# Patient Record
Sex: MALE | Race: WHITE | NOT HISPANIC OR LATINO | Employment: UNEMPLOYED | URBAN - METROPOLITAN AREA
[De-identification: names, ages, dates, MRNs, and addresses within clinical notes are randomized per-mention and may not be internally consistent; named-entity substitution may affect disease eponyms.]

---

## 2021-12-09 ENCOUNTER — APPOINTMENT (OUTPATIENT)
Dept: NEUROLOGY | Facility: HOSPITAL | Age: 26
End: 2021-12-09

## 2021-12-09 ENCOUNTER — APPOINTMENT (OUTPATIENT)
Dept: CT IMAGING | Facility: HOSPITAL | Age: 26
End: 2021-12-09

## 2021-12-09 ENCOUNTER — HOSPITAL ENCOUNTER (INPATIENT)
Facility: HOSPITAL | Age: 26
LOS: 1 days | Discharge: LEFT BEFORE DISCHARGE | End: 2021-12-09
Attending: EMERGENCY MEDICINE | Admitting: INTERNAL MEDICINE

## 2021-12-09 VITALS
DIASTOLIC BLOOD PRESSURE: 71 MMHG | WEIGHT: 233.03 LBS | HEIGHT: 67 IN | HEART RATE: 72 BPM | OXYGEN SATURATION: 100 % | SYSTOLIC BLOOD PRESSURE: 92 MMHG | RESPIRATION RATE: 16 BRPM | TEMPERATURE: 95.8 F | BODY MASS INDEX: 36.57 KG/M2

## 2021-12-09 DIAGNOSIS — G40.901 STATUS EPILEPTICUS (HCC): Primary | ICD-10-CM

## 2021-12-09 PROBLEM — F19.10 SUBSTANCE ABUSE: Status: ACTIVE | Noted: 2021-12-09

## 2021-12-09 PROBLEM — G93.41 ACUTE METABOLIC ENCEPHALOPATHY: Status: ACTIVE | Noted: 2021-12-09

## 2021-12-09 LAB
ALBUMIN SERPL-MCNC: 4.5 G/DL (ref 3.5–5.2)
ALBUMIN/GLOB SERPL: 2.4 G/DL
ALP SERPL-CCNC: 91 U/L (ref 39–117)
ALT SERPL W P-5'-P-CCNC: 45 U/L (ref 1–41)
AMPHET+METHAMPHET UR QL: POSITIVE
ANION GAP SERPL CALCULATED.3IONS-SCNC: 10.3 MMOL/L (ref 5–15)
AST SERPL-CCNC: 38 U/L (ref 1–40)
BACTERIA UR QL AUTO: NORMAL /HPF
BARBITURATES UR QL SCN: NEGATIVE
BASOPHILS # BLD AUTO: 0.05 10*3/MM3 (ref 0–0.2)
BASOPHILS NFR BLD AUTO: 0.5 % (ref 0–1.5)
BENZODIAZ UR QL SCN: POSITIVE
BILIRUB SERPL-MCNC: 0.2 MG/DL (ref 0–1.2)
BILIRUB UR QL STRIP: NEGATIVE
BUN SERPL-MCNC: 10 MG/DL (ref 6–20)
BUN/CREAT SERPL: 8.5 (ref 7–25)
CALCIUM SPEC-SCNC: 9.2 MG/DL (ref 8.6–10.5)
CANNABINOIDS SERPL QL: POSITIVE
CHLORIDE SERPL-SCNC: 105 MMOL/L (ref 98–107)
CLARITY UR: CLEAR
CO2 SERPL-SCNC: 25.7 MMOL/L (ref 22–29)
COCAINE UR QL: NEGATIVE
COLOR UR: YELLOW
CREAT SERPL-MCNC: 1.17 MG/DL (ref 0.76–1.27)
DEPRECATED RDW RBC AUTO: 38.5 FL (ref 37–54)
EOSINOPHIL # BLD AUTO: 0.08 10*3/MM3 (ref 0–0.4)
EOSINOPHIL NFR BLD AUTO: 0.9 % (ref 0.3–6.2)
ERYTHROCYTE [DISTWIDTH] IN BLOOD BY AUTOMATED COUNT: 12.2 % (ref 12.3–15.4)
ETHANOL BLD-MCNC: <10 MG/DL (ref 0–10)
ETHANOL UR QL: <0.01 %
GFR SERPL CREATININE-BSD FRML MDRD: 76 ML/MIN/1.73
GLOBULIN UR ELPH-MCNC: 1.9 GM/DL
GLUCOSE BLDC GLUCOMTR-MCNC: 114 MG/DL (ref 70–99)
GLUCOSE SERPL-MCNC: 109 MG/DL (ref 65–99)
GLUCOSE UR STRIP-MCNC: NEGATIVE MG/DL
HCT VFR BLD AUTO: 39.4 % (ref 37.5–51)
HGB BLD-MCNC: 13.7 G/DL (ref 13–17.7)
HGB UR QL STRIP.AUTO: NEGATIVE
HOLD SPECIMEN: NORMAL
HOLD SPECIMEN: NORMAL
HYALINE CASTS UR QL AUTO: NORMAL /LPF
IMM GRANULOCYTES # BLD AUTO: 0.03 10*3/MM3 (ref 0–0.05)
IMM GRANULOCYTES NFR BLD AUTO: 0.3 % (ref 0–0.5)
INR PPP: 1.03 (ref 2–3)
KETONES UR QL STRIP: NEGATIVE
LEUKOCYTE ESTERASE UR QL STRIP.AUTO: NEGATIVE
LYMPHOCYTES # BLD AUTO: 1.54 10*3/MM3 (ref 0.7–3.1)
LYMPHOCYTES NFR BLD AUTO: 16.4 % (ref 19.6–45.3)
MAGNESIUM SERPL-MCNC: 2.3 MG/DL (ref 1.6–2.6)
MCH RBC QN AUTO: 30.1 PG (ref 26.6–33)
MCHC RBC AUTO-ENTMCNC: 34.8 G/DL (ref 31.5–35.7)
MCV RBC AUTO: 86.6 FL (ref 79–97)
METHADONE UR QL SCN: NEGATIVE
MONOCYTES # BLD AUTO: 0.98 10*3/MM3 (ref 0.1–0.9)
MONOCYTES NFR BLD AUTO: 10.4 % (ref 5–12)
NEUTROPHILS NFR BLD AUTO: 6.73 10*3/MM3 (ref 1.7–7)
NEUTROPHILS NFR BLD AUTO: 71.5 % (ref 42.7–76)
NITRITE UR QL STRIP: NEGATIVE
NRBC BLD AUTO-RTO: 0 /100 WBC (ref 0–0.2)
OPIATES UR QL: NEGATIVE
OXYCODONE UR QL SCN: NEGATIVE
PH UR STRIP.AUTO: 6 [PH] (ref 5–8)
PHOSPHATE SERPL-MCNC: 2.5 MG/DL (ref 2.5–4.5)
PLATELET # BLD AUTO: 292 10*3/MM3 (ref 140–450)
PMV BLD AUTO: 9.5 FL (ref 6–12)
POTASSIUM SERPL-SCNC: 3.8 MMOL/L (ref 3.5–5.2)
PROT SERPL-MCNC: 6.4 G/DL (ref 6–8.5)
PROT UR QL STRIP: ABNORMAL
PROTHROMBIN TIME: 10.8 SECONDS (ref 9.4–12)
QT INTERVAL: 381 MS
RBC # BLD AUTO: 4.55 10*6/MM3 (ref 4.14–5.8)
RBC # UR STRIP: NORMAL /HPF
REF LAB TEST METHOD: NORMAL
SODIUM SERPL-SCNC: 141 MMOL/L (ref 136–145)
SP GR UR STRIP: 1.02 (ref 1–1.03)
SQUAMOUS #/AREA URNS HPF: NORMAL /HPF
TROPONIN T SERPL-MCNC: <0.01 NG/ML (ref 0–0.03)
UROBILINOGEN UR QL STRIP: ABNORMAL
WBC # UR STRIP: NORMAL /HPF
WBC NRBC COR # BLD: 9.41 10*3/MM3 (ref 3.4–10.8)
WHOLE BLOOD HOLD SPECIMEN: NORMAL
WHOLE BLOOD HOLD SPECIMEN: NORMAL

## 2021-12-09 PROCEDURE — 80307 DRUG TEST PRSMV CHEM ANLYZR: CPT | Performed by: EMERGENCY MEDICINE

## 2021-12-09 PROCEDURE — 25010000002 FOSPHENYTOIN 100 MG PE/2ML SOLUTION 2 ML VIAL: Performed by: EMERGENCY MEDICINE

## 2021-12-09 PROCEDURE — 84484 ASSAY OF TROPONIN QUANT: CPT | Performed by: EMERGENCY MEDICINE

## 2021-12-09 PROCEDURE — 80053 COMPREHEN METABOLIC PANEL: CPT | Performed by: EMERGENCY MEDICINE

## 2021-12-09 PROCEDURE — 70450 CT HEAD/BRAIN W/O DYE: CPT

## 2021-12-09 PROCEDURE — 85610 PROTHROMBIN TIME: CPT | Performed by: EMERGENCY MEDICINE

## 2021-12-09 PROCEDURE — 85025 COMPLETE CBC W/AUTO DIFF WBC: CPT | Performed by: EMERGENCY MEDICINE

## 2021-12-09 PROCEDURE — 93010 ELECTROCARDIOGRAM REPORT: CPT | Performed by: INTERNAL MEDICINE

## 2021-12-09 PROCEDURE — 82077 ASSAY SPEC XCP UR&BREATH IA: CPT | Performed by: EMERGENCY MEDICINE

## 2021-12-09 PROCEDURE — 93005 ELECTROCARDIOGRAM TRACING: CPT

## 2021-12-09 PROCEDURE — 99285 EMERGENCY DEPT VISIT HI MDM: CPT

## 2021-12-09 PROCEDURE — 25010000002 LORAZEPAM PER 2 MG: Performed by: EMERGENCY MEDICINE

## 2021-12-09 PROCEDURE — 99223 1ST HOSP IP/OBS HIGH 75: CPT | Performed by: INTERNAL MEDICINE

## 2021-12-09 PROCEDURE — 84100 ASSAY OF PHOSPHORUS: CPT | Performed by: INTERNAL MEDICINE

## 2021-12-09 PROCEDURE — 82962 GLUCOSE BLOOD TEST: CPT

## 2021-12-09 PROCEDURE — 95819 EEG AWAKE AND ASLEEP: CPT

## 2021-12-09 PROCEDURE — 93005 ELECTROCARDIOGRAM TRACING: CPT | Performed by: EMERGENCY MEDICINE

## 2021-12-09 PROCEDURE — 0 LEVETIRACETAM IN NACL 0.75% 1000 MG/100ML SOLUTION: Performed by: INTERNAL MEDICINE

## 2021-12-09 PROCEDURE — 83735 ASSAY OF MAGNESIUM: CPT | Performed by: EMERGENCY MEDICINE

## 2021-12-09 PROCEDURE — 25010000002 FOSPHENYTOIN 500 MG PE/10ML SOLUTION 10 ML VIAL: Performed by: EMERGENCY MEDICINE

## 2021-12-09 PROCEDURE — 81001 URINALYSIS AUTO W/SCOPE: CPT | Performed by: EMERGENCY MEDICINE

## 2021-12-09 RX ORDER — ONDANSETRON 2 MG/ML
4 INJECTION INTRAMUSCULAR; INTRAVENOUS EVERY 6 HOURS PRN
Status: DISCONTINUED | OUTPATIENT
Start: 2021-12-09 | End: 2021-12-09 | Stop reason: HOSPADM

## 2021-12-09 RX ORDER — NALOXONE HYDROCHLORIDE 1 MG/ML
INJECTION INTRAMUSCULAR; INTRAVENOUS; SUBCUTANEOUS
Status: DISPENSED
Start: 2021-12-09 | End: 2021-12-09

## 2021-12-09 RX ORDER — LORAZEPAM 2 MG/ML
2 INJECTION INTRAMUSCULAR ONCE
Status: DISCONTINUED | OUTPATIENT
Start: 2021-12-09 | End: 2021-12-09

## 2021-12-09 RX ORDER — SODIUM CHLORIDE 0.9 % (FLUSH) 0.9 %
10 SYRINGE (ML) INJECTION EVERY 12 HOURS SCHEDULED
Status: DISCONTINUED | OUTPATIENT
Start: 2021-12-09 | End: 2021-12-09 | Stop reason: HOSPADM

## 2021-12-09 RX ORDER — FAMOTIDINE 20 MG/1
20 TABLET, FILM COATED ORAL DAILY
Status: DISCONTINUED | OUTPATIENT
Start: 2021-12-09 | End: 2021-12-09 | Stop reason: HOSPADM

## 2021-12-09 RX ORDER — ACETAMINOPHEN 160 MG/5ML
650 SOLUTION ORAL EVERY 4 HOURS PRN
Status: DISCONTINUED | OUTPATIENT
Start: 2021-12-09 | End: 2021-12-09 | Stop reason: HOSPADM

## 2021-12-09 RX ORDER — ACETAMINOPHEN 650 MG/1
650 SUPPOSITORY RECTAL EVERY 4 HOURS PRN
Status: DISCONTINUED | OUTPATIENT
Start: 2021-12-09 | End: 2021-12-09 | Stop reason: HOSPADM

## 2021-12-09 RX ORDER — LORAZEPAM 2 MG/ML
2 INJECTION INTRAMUSCULAR ONCE
Status: COMPLETED | OUTPATIENT
Start: 2021-12-09 | End: 2021-12-09

## 2021-12-09 RX ORDER — SODIUM CHLORIDE, SODIUM LACTATE, POTASSIUM CHLORIDE, CALCIUM CHLORIDE 600; 310; 30; 20 MG/100ML; MG/100ML; MG/100ML; MG/100ML
100 INJECTION, SOLUTION INTRAVENOUS CONTINUOUS
Status: DISCONTINUED | OUTPATIENT
Start: 2021-12-09 | End: 2021-12-09 | Stop reason: HOSPADM

## 2021-12-09 RX ORDER — LORAZEPAM 2 MG/ML
2 INJECTION INTRAMUSCULAR
Status: DISCONTINUED | OUTPATIENT
Start: 2021-12-09 | End: 2021-12-09 | Stop reason: HOSPADM

## 2021-12-09 RX ORDER — NALOXONE HYDROCHLORIDE 1 MG/ML
2 INJECTION INTRAMUSCULAR; INTRAVENOUS; SUBCUTANEOUS ONCE
Status: COMPLETED | OUTPATIENT
Start: 2021-12-09 | End: 2021-12-09

## 2021-12-09 RX ORDER — KETOROLAC TROMETHAMINE 30 MG/ML
30 INJECTION, SOLUTION INTRAMUSCULAR; INTRAVENOUS EVERY 6 HOURS PRN
Status: DISCONTINUED | OUTPATIENT
Start: 2021-12-09 | End: 2021-12-09 | Stop reason: HOSPADM

## 2021-12-09 RX ORDER — POLYETHYLENE GLYCOL 3350 17 G/17G
17 POWDER, FOR SOLUTION ORAL DAILY PRN
Status: DISCONTINUED | OUTPATIENT
Start: 2021-12-09 | End: 2021-12-09 | Stop reason: HOSPADM

## 2021-12-09 RX ORDER — ACETAMINOPHEN 325 MG/1
650 TABLET ORAL EVERY 4 HOURS PRN
Status: DISCONTINUED | OUTPATIENT
Start: 2021-12-09 | End: 2021-12-09 | Stop reason: HOSPADM

## 2021-12-09 RX ORDER — AMOXICILLIN 250 MG
2 CAPSULE ORAL 2 TIMES DAILY
Status: DISCONTINUED | OUTPATIENT
Start: 2021-12-09 | End: 2021-12-09 | Stop reason: HOSPADM

## 2021-12-09 RX ORDER — NALOXONE HCL 0.4 MG/ML
0.4 VIAL (ML) INJECTION
Status: DISCONTINUED | OUTPATIENT
Start: 2021-12-09 | End: 2021-12-09 | Stop reason: HOSPADM

## 2021-12-09 RX ORDER — BISACODYL 10 MG
10 SUPPOSITORY, RECTAL RECTAL DAILY PRN
Status: DISCONTINUED | OUTPATIENT
Start: 2021-12-09 | End: 2021-12-09 | Stop reason: HOSPADM

## 2021-12-09 RX ORDER — CHOLECALCIFEROL (VITAMIN D3) 125 MCG
5 CAPSULE ORAL NIGHTLY PRN
Status: DISCONTINUED | OUTPATIENT
Start: 2021-12-09 | End: 2021-12-09 | Stop reason: HOSPADM

## 2021-12-09 RX ORDER — LEVETIRACETAM 10 MG/ML
1000 INJECTION INTRAVASCULAR EVERY 12 HOURS SCHEDULED
Status: DISCONTINUED | OUTPATIENT
Start: 2021-12-09 | End: 2021-12-09 | Stop reason: HOSPADM

## 2021-12-09 RX ORDER — SODIUM CHLORIDE 0.9 % (FLUSH) 0.9 %
10 SYRINGE (ML) INJECTION AS NEEDED
Status: DISCONTINUED | OUTPATIENT
Start: 2021-12-09 | End: 2021-12-09 | Stop reason: HOSPADM

## 2021-12-09 RX ORDER — SODIUM CHLORIDE 0.9 % (FLUSH) 0.9 %
10 SYRINGE (ML) INJECTION AS NEEDED
Status: DISCONTINUED | OUTPATIENT
Start: 2021-12-09 | End: 2021-12-09

## 2021-12-09 RX ORDER — BISACODYL 5 MG/1
5 TABLET, DELAYED RELEASE ORAL DAILY PRN
Status: DISCONTINUED | OUTPATIENT
Start: 2021-12-09 | End: 2021-12-09 | Stop reason: HOSPADM

## 2021-12-09 RX ORDER — LORAZEPAM 2 MG/ML
INJECTION INTRAMUSCULAR
Status: DISPENSED
Start: 2021-12-09 | End: 2021-12-09

## 2021-12-09 RX ADMIN — LEVETIRACETAM 1000 MG: 10 INJECTION, SOLUTION INTRAVENOUS at 13:21

## 2021-12-09 RX ADMIN — LORAZEPAM 2 MG: 2 INJECTION INTRAMUSCULAR; INTRAVENOUS at 05:25

## 2021-12-09 RX ADMIN — FOSPHENYTOIN SODIUM 1590 MG PE: 50 INJECTION, SOLUTION INTRAMUSCULAR; INTRAVENOUS at 05:47

## 2021-12-09 RX ADMIN — NALOXONE HYDROCHLORIDE 2 MG: 1 INJECTION PARENTERAL at 04:52

## 2021-12-09 RX ADMIN — SODIUM CHLORIDE, POTASSIUM CHLORIDE, SODIUM LACTATE AND CALCIUM CHLORIDE 100 ML/HR: 600; 310; 30; 20 INJECTION, SOLUTION INTRAVENOUS at 13:21

## 2021-12-09 RX ADMIN — SODIUM CHLORIDE 1000 ML: 9 INJECTION, SOLUTION INTRAVENOUS at 05:42

## 2021-12-09 NOTE — ED NOTES
"Patient arrived with EMS. EMS called for seizure activity and reports 2 seizures as well as \"bizarre activity\" while en route to [facility]. Patient was given 2.5mg of versed and 2mg of narcan from EMS. Per EMS patient is on keppra and lamictal with a history of seizures.      Christine Hinojosa RN  12/09/21 1249    "

## 2021-12-09 NOTE — ED NOTES
Patient had tonic clonic seizure activity lasting approximately 60 seconds. Placed on nonrebreather at 15L, oral suction complete, and IV ativan given. Postictal patient is asleep, but will arouse to pain.      Christine Hinojosa RN  12/09/21 0625

## 2021-12-09 NOTE — ED PROVIDER NOTES
Time: 5:17 AM EST  Arrived by: ambulance  Chief Complaint: Seizure  History provided by: EMS and patient  History is limited by: N/A     History of Present Illness:  Patient is a 26 y.o. year old male that presents to the emergency department with seizure.  According to EMS, family called due to seizure.  Upon EMSs arrival, the patient was awake and alert but with erratic behavior.  They noted to nursing staff that they suspected possible substance abuse.  In route however, the patient began having a seizure.  The patient was administered 2.5 mg of Versed which stopped the seizure.  Upon arrival to the emergency room, the patient was postictal, confused with decreased responsiveness.  The patient's blood glucose was normal.  The patient was administered 2 mg of Narcan before I arrived in the room which the nurse believes drastically improved the patient's mental status.  When I evaluated the patient he was awake and alert but disoriented.  The patient states that he has seizures since he was 22 years old.  The patient states he takes Keppra and Lamictal.  The patient states that he has been taking that medicine.  Patient denies any headache, nausea, chest pain, chest pressure, shortness breath, palpitations, abdominal pain or any other symptoms.  The patient states that his only substance abuse is marijuana      Similar Symptoms Previously: Yes due to epilepsy  Recently seen: Unknown      Patient Care Team  Primary Care Provider: Provider, No Known    Past Medical History:     No Known Allergies  Past Medical History:   Diagnosis Date   • Seizures (HCC)      History reviewed. No pertinent surgical history.  History reviewed. No pertinent family history.    Home Medications:  Prior to Admission medications    Not on File        Social History:   Social History     Tobacco Use   • Smoking status: Not on file   • Smokeless tobacco: Not on file   Substance Use Topics   • Alcohol use: Not on file   • Drug use: Not on  "file          Record Review:  I have reviewed the patient's records in [facility].     Review of Systems:  Review of Systems   Constitutional: Negative for chills and fever.   HENT: Negative for congestion, postnasal drip, rhinorrhea and sore throat.    Eyes: Negative for photophobia.   Respiratory: Negative for apnea, cough and shortness of breath.    Cardiovascular: Negative for chest pain.   Gastrointestinal: Negative for abdominal pain, diarrhea, nausea and vomiting.   Genitourinary: Negative for difficulty urinating, dysuria, frequency, hematuria and urgency.   Musculoskeletal: Negative for gait problem and neck pain.   Skin: Negative for pallor.   Neurological: Positive for seizures. Negative for dizziness, syncope, weakness and headaches.           Physical Exam:  BP 92/71   Pulse 72   Temp 95.8 °F (35.4 °C)   Resp 16   Ht 170.2 cm (67\")   Wt 106 kg (233 lb 0.4 oz)   SpO2 100%   BMI 36.50 kg/m²     Physical Exam  Vitals and nursing note reviewed.   Constitutional:       General: He is not in acute distress.     Appearance: He is not ill-appearing, toxic-appearing or diaphoretic.   HENT:      Head: Normocephalic and atraumatic.      Mouth/Throat:      Mouth: Mucous membranes are moist.   Eyes:      Extraocular Movements: Extraocular movements intact.      Pupils: Pupils are equal, round, and reactive to light.   Cardiovascular:      Rate and Rhythm: Normal rate and regular rhythm.      Pulses: Normal pulses.      Heart sounds: Normal heart sounds. No murmur heard.      Pulmonary:      Effort: Pulmonary effort is normal. No accessory muscle usage, respiratory distress or retractions.      Breath sounds: Normal breath sounds. No wheezing, rhonchi or rales.   Abdominal:      General: Abdomen is flat. There is no distension.      Palpations: Abdomen is soft. There is no mass.      Tenderness: There is no abdominal tenderness. There is no right CVA tenderness, left CVA tenderness, guarding or rebound.      " Comments: No rigidity   Musculoskeletal:         General: No swelling, tenderness, deformity or signs of injury.      Cervical back: Normal range of motion and neck supple.      Right lower leg: No edema.      Left lower leg: No edema.   Skin:     General: Skin is warm and dry.      Capillary Refill: Capillary refill takes less than 2 seconds.      Coloration: Skin is not jaundiced or pale.      Findings: No bruising or erythema.   Neurological:      Mental Status: He is alert. He is disoriented.      GCS: GCS eye subscore is 4. GCS verbal subscore is 5. GCS motor subscore is 6.      Cranial Nerves: No cranial nerve deficit, dysarthria or facial asymmetry.      Sensory: No sensory deficit.      Motor: No weakness.      Comments: Complete neurological exam cannot be performed can secondary to the patient's slight confusion                Medications in the Emergency Department:  Medications   Naloxone HCl (NARCAN) injection 2 mg (2 mg Intravenous Given 12/9/21 0452)   sodium chloride 0.9 % bolus 1,000 mL (0 mL Intravenous Stopped 12/9/21 0621)   LORazepam (ATIVAN) injection 2 mg (2 mg Intravenous Given 12/9/21 0525)   fosphenytoin (Cerebyx) 1,590 mg PE in sodium chloride 0.9 % 100 mL IVPB (0 mg PE/kg × 106 kg Intravenous Stopped 12/9/21 0624)        Labs  Lab Results (last 24 hours)     ** No results found for the last 24 hours. **           Imaging:  CT Head Without Contrast   Final Result          Procedures:  Procedures    Progress                            Medical Decision Making:  MDM  Number of Diagnoses or Management Options  Status epilepticus (HCC)  Diagnosis management comments:     I was called to the patient's room at 5:20 AM.  The patient was actively having a seizure.  The patient had tonic-clonic activity.  Patient's pulse ox dropped down to 78%.  The seizure lasted for about a minute.  Patient's pulse ox returned back to 100%.  The patient was placed on oxygen.  2 mg of Ativan was administered.  A  gram of Celebrex will also be ordered    The patient had no further seizures in the emergency department.  The patient's CT was normal.  When the patient returned from CT.  The patient was postictal and somnolent.  The patient does open his eyes to painful stimulus.  The patient does localize to painful stimulus.  The patient's pulse ox was 92% on 2 L.  Patient will subsequently admitted to the hospital for status epilepticus       Amount and/or Complexity of Data Reviewed  Clinical lab tests: reviewed  Tests in the radiology section of CPT®: reviewed  Tests in the medicine section of CPT®: reviewed  Discuss the patient with other providers: yes (I discussed the patient with Dr. Freeman, neurologist on-call.  He feels the patient should be admitted to the hospital for status epilepticus.  He is comfortable with the current plan.  He will see and evaluate the patient in the hospital.  He is requesting the hospitalist admit.    I discussed case with the hospitalist, Dr. Major, who is agreeable for admission)    Critical Care  Total time providing critical care: < 30 minutes (Total critical care time of.  Total critical care time documented does not include time spent on separately billed procedures for services of nurses or physician assistants.  I personally saw and examined the patient.  I have reviewed all diagnostic interpretations and treatment plans as written.  I was present for the key portions of any procedures performed and the inclusive time noted in any critical care statement.  Critical care time includes patient management by me, time spent at the patient bedside, time to review labs/ ABG's, imaging results, discussing patient care, documentation in the medical record and time spent with family or caregiver)             Final diagnoses:   Status epilepticus (HCC)        Disposition:  ED Disposition     ED Disposition Condition Comment    AMA  Level of Care: Progressive Care [20]   Diagnosis: Status  epilepticus (HCC) [079834]   Admitting Physician: ELIZABETH FRANCISCO [[id number]]   Attending Physician: ELIZABETH FRANCISCO [[id number]]   Isolate for COVID?: No [0]   Certification: I Certify That Inpatient Hospital Services Are Medically Necessary For Greater Than 2 Midnights            This medical record created using voice recognition software and may contain unintended errors.    DO Conchita Madrid Scott, DO  12/10/21 0731

## 2021-12-09 NOTE — ED NOTES
Attempted to contact family using phone numbers on file. The first contact is [facility] which patient no longer lives in. The second contact is a disconnected phone number.      Christine Hinojosa RN  12/09/21 8592

## 2021-12-09 NOTE — PAYOR COMM NOTE
"Donald Blake (26 y.o. Male)             Date of Birth Social Security Number Address Home Phone MRN    1995  [address] [place] [id number] [phone] [id number]    Yarsani Marital Status             Unknown Unknown       Admission Date Admission Type Admitting Provider Attending Provider Department, Room/Bed    12/9/21 Emergency Praveen Major MD Dishaw, Scott, DO [facility] EMERGENCY ROOM, 11/11    Discharge Date Discharge Disposition Discharge Destination                         Attending Provider: Tito Arango DO    Allergies: No Known Allergies    Isolation: None   Infection: None   Code Status: CPR   Advance Care Planning Activity    Ht: 170.2 cm (67\")   Wt: 106 kg (233 lb 0.4 oz)    Admission Cmt: None   Principal Problem: None                Active Insurance as of 12/9/2021     Primary Coverage     Payor Plan Insurance Group Employer/Plan Group    [facility] BY MIK [id number]     Payor Plan Address Payor Plan Phone Number Payor Plan Fax Number Effective Dates    [address]   1/1/2021 - None Entered    [place] [address]       Subscriber Name Subscriber Birth Date Member ID       DONALD BLAKE 1995 [id number]                 Emergency Contacts          No emergency contacts on file.              LOC:Acute Adult-Epilepsy by Sigrid Goff         In Progress (Acute Met): Reviewed on 12/9/2021 by Sigrid Goff       Created Using Review Status Review Entered   InterQual Connect™ In Primary 12/9/2021 14:43       Criteria Set Name - Subset   LOC:Acute Adult-Epilepsy      Criteria Review   REVIEW SUMMARY     InterQual® Review Status: In Primary  Condition Specific: Yes        REVIEW DETAILS     Product: LOC:Acute Adult  Subset: Epilepsy        (Symptom or finding within 24h)     (Excludes PO medications unless noted)         [X] Select Day, One:          [X] Episode Day 1, One:              [X] ACUTE, One:                  " "[X] Known seizure disorder and, Both:                      [X] Finding, >= One:                          [X] >= 2 seizures within 24h and a change from baseline                      [X] EEG (continuous or video), initiated or performed within 24h        Version: InterQual® 2021, Apr. 2021 Release  InterQual® criteria (IQ) is confidential and proprietary information and is being provided to you solely as it pertains to the information requested. IQ may contain advanced clinical knowledge which we recommend you discuss with your physician upon disclosure to you. Use permitted by and subject to license with [employer] and/or one of its subsidiaries. IQ reflects clinical interpretations and analyses and cannot alone either (a) resolve medical ambiguities of particular situations; or (b) provide the sole basis for definitive decisions. IQ is intended solely for use as screening guidelines with respect to medical appropriateness of healthcare services. All ultimate care decisions are strictly and solely the obligation and responsibility of your health care provider. © 2021 [employer] and/or one of its subsidiaries. All Rights Reserved. CPT® only © 1842-0693 American Medical Association. All Rights Reserved.         Mary Jo: RUFINO [id number] Tax ID [id number]  Problem List           Codes Noted - Resolved       Hospital    Status epilepticus (HCC) ICD-10-CM: G40.901  ICD-9-CM: 345.3 12/9/2021 - Present    Substance abuse (HCC) ICD-10-CM: F19.10  ICD-9-CM: 305.90 12/9/2021 - Present    Acute metabolic encephalopathy ICD-10-CM: G93.41  ICD-9-CM: 348.31 12/9/2021 - Present          History & Physical    No notes of this type exist for this encounter.            Emergency Department Notes      Christine Hinojosa RN at 12/09/21 0420        Patient arrived with [facility]. EMS called for seizure activity and reports 2 seizures as well as \"bizarre activity\" while en route to [facility]. Patient was given 2.5mg of versed and " 2mg of narcan from EMS. Per EMS patient is on keppra and lamictal with a history of seizures.      Christine Hinojosa RN  12/09/21 0640      Electronically signed by Christine Hinojosa RN at 12/09/21 0640     Christine Hinojosa RN at 12/09/21 0500        Attempted to contact family using phone numbers on file. The first contact is [facility] which patient no longer lives in. The second contact is a disconnected phone number.      Christine Hinojosa RN  12/09/21 0636      Electronically signed by Christine Hinojosa RN at 12/09/21 0636     Christine Hinojosa RN at 12/09/21 0530        Patient had tonic clonic seizure activity lasting approximately 60 seconds. Placed on nonrebreather at 15L, oral suction complete, and IV ativan given. Postictal patient is asleep, but will arouse to pain.      Christine Hinojosa RN  12/09/21 0634      Electronically signed by Christine Hinojosa RN at 12/09/21 0634       Vital Signs (last day)     Date/Time Temp Temp src Pulse Resp BP Patient Position SpO2    12/09/21 1415 -- -- 82 -- 144/82 -- --    12/09/21 1400 -- -- 87 -- 151/77 -- --    12/09/21 1345 -- -- 71 -- 150/78 -- --    12/09/21 1330 -- -- 81 -- 128/72 -- 95    12/09/21 1315 -- -- 79 -- 144/71 -- --    12/09/21 1245 -- -- 81 -- -- -- --    12/09/21 1230 -- -- 79 -- -- -- --    12/09/21 1215 -- -- 78 -- 137/70 -- 100    12/09/21 1200 -- -- 78 -- 137/76 -- 100    12/09/21 1145 -- -- 79 -- 137/75 -- 100    12/09/21 1130 -- -- 67 -- 138/81 -- 100    12/09/21 1115 -- -- 71 -- 146/71 -- 99    12/09/21 1100 -- -- 80 -- 126/71 -- 100    12/09/21 1045 -- -- 79 -- 138/73 -- 100    12/09/21 1030 -- -- 73 -- 137/76 -- 100    12/09/21 1015 -- -- 78 -- 122/71 -- 100    12/09/21 1000 -- -- 79 -- 136/67 -- 100    12/09/21 0945 -- -- 75 -- 122/72 -- 100    12/09/21 0930 -- -- 73 -- 137/75 -- 100    12/09/21 0915 -- -- 77 -- 127/69 -- 100    12/09/21 0900 -- -- 77 -- 112/63 -- 100    12/09/21 0845 -- -- 80 -- 124/71 -- 99    12/09/21  0830 -- -- 81 -- 117/60 -- 99    12/09/21 0800 -- -- 81 -- 108/63 -- 98    12/09/21 0745 -- -- 86 -- 124/57 -- 97    12/09/21 0730 -- -- 85 -- 117/59 -- 94    12/09/21 0715 -- -- 87 -- 109/51 -- 94    12/09/21 0700 -- -- 83 -- 115/50 -- 95    12/09/21 0631 -- -- 88 -- -- -- --    12/09/21 0630 -- -- 87 -- 109/57 -- 94    12/09/21 0615 -- -- 74 -- 130/65 -- 99    12/09/21 0600 -- -- 82 -- 127/64 -- 100    12/09/21 0510 95.8 (35.4) -- -- -- -- -- --    12/09/21 0445 -- -- 84 -- 110/58 -- 98    12/09/21 0428 -- -- 75 16 129/76 -- 99          Oxygen Therapy (last day)     Date/Time SpO2 Device (Oxygen Therapy) Flow (L/min) Oxygen Concentration (%) ETCO2 (mmHg)    12/09/21 1330 95 -- -- -- --    12/09/21 1215 100 -- -- -- --    12/09/21 1200 100 -- -- -- --    12/09/21 1145 100 -- -- -- --    12/09/21 1130 100 -- -- -- --    12/09/21 1115 99 -- -- -- --    12/09/21 1100 100 -- -- -- --    12/09/21 1045 100 -- -- -- --    12/09/21 1030 100 -- -- -- --    12/09/21 1015 100 -- -- -- --    12/09/21 1000 100 -- -- -- --    12/09/21 0945 100 -- -- -- --    12/09/21 0930 100 -- -- -- --    12/09/21 0915 100 -- -- -- --    12/09/21 0900 100 -- -- -- --    12/09/21 0845 99 -- -- -- --    12/09/21 0830 99 -- -- -- --    12/09/21 0800 98 -- -- -- --    12/09/21 0745 97 -- -- -- --    12/09/21 0730 94 -- -- -- --    12/09/21 0715 94 -- -- -- --    12/09/21 0700 95 -- -- -- --    12/09/21 0630 94 -- -- -- --    12/09/21 0615 99 -- -- -- --    12/09/21 0600 100 -- -- -- --    12/09/21 0445 98 -- -- -- --    12/09/21 0428 99 -- -- -- --            Facility-Administered Medications as of 12/9/2021   Medication Dose Route Frequency Provider Last Rate Last Admin   • acetaminophen (TYLENOL) tablet 650 mg  650 mg Oral Q4H PRN Praveen Major MD        Or   • acetaminophen (TYLENOL) 160 MG/5ML solution 650 mg  650 mg Oral Q4H PRPraveen Enriquez MD        Or   • acetaminophen (TYLENOL) suppository 650 mg  650 mg Rectal Q4H PRN Praveen Major  MD Nano       • sennosides-docusate (PERICOLACE) 8.6-50 MG per tablet 2 tablet  2 tablet Oral BID Praveen Major MD        And   • polyethylene glycol (MIRALAX) packet 17 g  17 g Oral Daily PRN Praveen Major MD        And   • bisacodyl (DULCOLAX) EC tablet 5 mg  5 mg Oral Daily PRN Praveen Major MD        And   • bisacodyl (DULCOLAX) suppository 10 mg  10 mg Rectal Daily PRN Praveen Major MD       • famotidine (PEPCID) tablet 20 mg  20 mg Oral Daily Praveen Major MD       • [COMPLETED] fosphenytoin (Cerebyx) 1,590 mg PE in sodium chloride 0.9 % 100 mL IVPB  15 mg PE/kg Intravenous Once Tito Arango DO   Stopped at 12/09/21 0624   • HYDROmorphone (DILAUDID) injection 0.5 mg  0.5 mg Intravenous Q4H PRN Praveen Major MD        And   • naloxone (NARCAN) injection 0.4 mg  0.4 mg Intravenous Q5 Min PRN Praveen Major MD       • ketorolac (TORADOL) injection 30 mg  30 mg Intravenous Q6H PRN Praveen Major MD       • lactated ringers infusion  100 mL/hr Intravenous Continuous Praveen Major  mL/hr at 12/09/21 1321 100 mL/hr at 12/09/21 1321   • levETIRAcetam in NaCl 0.75% (KEPPRA) IVPB 1,000 mg  1,000 mg Intravenous Q12H Praveen Major MD   1,000 mg at 12/09/21 1321   • [COMPLETED] LORazepam (ATIVAN) injection 2 mg  2 mg Intravenous Once Tito Arango DO   2 mg at 12/09/21 0525   • LORazepam (ATIVAN) injection 2 mg  2 mg Intravenous Q5 Min PRN Praveen Major MD       • melatonin tablet 5 mg  5 mg Oral Nightly PRN Praveen Major MD       • [COMPLETED] Naloxone HCl (NARCAN) injection 2 mg  2 mg Intravenous Once Tito Arango DO   2 mg at 12/09/21 0452   • ondansetron (ZOFRAN) injection 4 mg  4 mg Intravenous Q6H PRN Praveen Major MD       • [COMPLETED] sodium chloride 0.9 % bolus 1,000 mL  1,000 mL Intravenous Once Tito Arango DO   Stopped at 12/09/21 0621   • sodium chloride 0.9 % flush 10 mL  10 mL Intravenous Q12H Praveen Major MD       • sodium chloride 0.9 % flush 10 mL  10  mL Intravenous Praveen Monroy MD         Respiratory Therapy (last 24 hours)     Respiratory Therapy Flowsheet     Row Name 12/09/21 1415 12/09/21 1400 12/09/21 1345 12/09/21 1330 12/09/21 1315       Oxygen Therapy    SpO2 -- -- -- 95 % --       Vital Signs    Pulse 82 87 71 81 79    /82 151/77 150/78 128/72 144/71    Noninvasive MAP (mmHg) 98 96 99 87 92    Row Name 12/09/21 1245 12/09/21 1230 12/09/21 1215 12/09/21 1200 12/09/21 1145       Oxygen Therapy    SpO2 -- -- 100 % 100 % 100 %       Vital Signs    Pulse 81 79 78 78 79    BP -- -- 137/70 137/76 137/75    Noninvasive MAP (mmHg) -- -- 89 92 90    Row Name 12/09/21 1130 12/09/21 1115 12/09/21 1100 12/09/21 1045 12/09/21 1030       Oxygen Therapy    SpO2 100 % 99 % 100 % 100 % 100 %       Vital Signs    Pulse 67 71 80 79 73    /81 146/71 126/71 138/73 137/76    Noninvasive MAP (mmHg) 96 93 85 92 93    Row Name 12/09/21 1015 12/09/21 1000 12/09/21 0945 12/09/21 0930 12/09/21 0915       Oxygen Therapy    SpO2 100 % 100 % 100 % 100 % 100 %       Vital Signs    Pulse 78 79 75 73 77    /71 136/67 122/72 137/75 127/69    Noninvasive MAP (mmHg) 87 87 88 92 85    Row Name 12/09/21 0900 12/09/21 0845 12/09/21 0830 12/09/21 0800 12/09/21 0745       Oxygen Therapy    SpO2 100 % 99 % 99 % 98 % 97 %       Vital Signs    Pulse 77 80 81 81 86    /63 124/71 117/60 108/63 124/57    Noninvasive MAP (mmHg) 79 86 77 76 76    Row Name 12/09/21 0730 12/09/21 0715 12/09/21 0700 12/09/21 0631 12/09/21 0630       Oxygen Therapy    SpO2 94 % 94 % 95 % -- 94 %       Vital Signs    Pulse 85 87 83 88 87    /59 109/51 115/50 -- 109/57    Noninvasive MAP (mmHg) 75 69 69 -- 72    Row Name 12/09/21 0615 12/09/21 0600 12/09/21 0510 12/09/21 0445 12/09/21 0428       Oxygen Therapy    SpO2 99 % 100 % -- 98 % 99 %       Vital Signs    Temp -- -- 95.8 °F (35.4 °C) -- --    Pulse 74 82 -- 84 75    Heart Rate Source -- -- -- -- Monitor    Resp -- -- -- -- 16    BP  130/65 127/64 -- 110/58 129/76    Noninvasive MAP (mmHg) 85 82 -- 73 --              Physician Progress Notes (last 24 hours)  Notes from 12/08/21 1530 through 12/09/21 1530   No notes of this type exist for this encounter.         Consult Notes (last 24 hours)  Notes from 12/08/21 1530 through 12/09/21 1530   No notes of this type exist for this encounter.         Respiratory Therapy Notes (last 24 hours)  Notes from 12/08/21 1530 through 12/09/21 1530   No notes exist for this encounter.       Tito Arango DO   Physician   Specialty:  Emergency Medicine   ED Provider Notes       Shared   Date of Service:  12/09/21 0517   Creation Time:  12/09/21 0517              Shared        Expand All Collapse All        Show:Clear all  [x]Manual[x]Template[]Copied    Added by:  [x]Tito Arango DO      []Hover for details    Time: 5:17 AM EST  Arrived by: ambulance  Chief Complaint: Seizure  History provided by: EMS and patient  History is limited by: N/A      History of Present Illness:  Patient is a 26 y.o. year old male that presents to the emergency department with seizure.  According to EMS, family called due to seizure.  Upon EMSs arrival, the patient was awake and alert but with erratic behavior.  They noted to nursing staff that they suspected possible substance abuse.  In route however, the patient began having a seizure.  The patient was administered 2.5 mg of Versed which stopped the seizure.  Upon arrival to the emergency room, the patient was postictal, confused with decreased responsiveness.  The patient's blood glucose was normal.  The patient was administered 2 mg of Narcan before I arrived in the room which the nurse believes drastically improved the patient's mental status.  When I evaluated the patient he was awake and alert but disoriented.  The patient states that he has seizures since he was 22 years old.  The patient states he takes Keppra and Lamictal.  The patient states that he has been taking that  "medicine.  Patient denies any headache, nausea, chest pain, chest pressure, shortness breath, palpitations, abdominal pain or any other symptoms.  The patient states that his only substance abuse is marijuana        Similar Symptoms Previously: Yes due to epilepsy  Recently seen: Unknown        Patient Care Team  Primary Care Provider: No primary care provider on file.     Past Medical History:      No Known Allergies  Medical History        Past Medical History:   Diagnosis Date   • Seizures (HCC)           Surgical History   History reviewed. No pertinent surgical history.     History reviewed. No pertinent family history.     Home Medications:  Prior to Admission medications    Not on File         Social History:   Social History           Tobacco Use   • Smoking status: Not on file   • Smokeless tobacco: Not on file   Substance Use Topics   • Alcohol use: Not on file   • Drug use: Not on file            Record Review:  I have reviewed the patient's records in [employer].      Review of Systems:  Review of Systems   Constitutional: Negative for chills and fever.   HENT: Negative for congestion, postnasal drip, rhinorrhea and sore throat.    Eyes: Negative for photophobia.   Respiratory: Negative for apnea, cough and shortness of breath.    Cardiovascular: Negative for chest pain.   Gastrointestinal: Negative for abdominal pain, diarrhea, nausea and vomiting.   Genitourinary: Negative for difficulty urinating, dysuria, frequency, hematuria and urgency.   Musculoskeletal: Negative for gait problem and neck pain.   Skin: Negative for pallor.   Neurological: Positive for seizures. Negative for dizziness, syncope, weakness and headaches.               Physical Exam:  /50   Pulse 83   Temp 95.8 °F (35.4 °C)   Resp 16   Ht 170.2 cm (67\")   Wt 106 kg (233 lb 0.4 oz)   SpO2 95%   BMI 36.50 kg/m²      Physical Exam  Vitals and nursing note reviewed.   Constitutional:       General: He is not in acute distress.     " Appearance: He is not ill-appearing, toxic-appearing or diaphoretic.   HENT:      Head: Normocephalic and atraumatic.      Mouth/Throat:      Mouth: Mucous membranes are moist.   Eyes:      Extraocular Movements: Extraocular movements intact.      Pupils: Pupils are equal, round, and reactive to light.   Cardiovascular:      Rate and Rhythm: Normal rate and regular rhythm.      Pulses: Normal pulses.      Heart sounds: Normal heart sounds. No murmur heard.       Pulmonary:      Effort: Pulmonary effort is normal. No accessory muscle usage, respiratory distress or retractions.      Breath sounds: Normal breath sounds. No wheezing, rhonchi or rales.   Abdominal:      General: Abdomen is flat. There is no distension.      Palpations: Abdomen is soft. There is no mass.      Tenderness: There is no abdominal tenderness. There is no right CVA tenderness, left CVA tenderness, guarding or rebound.      Comments: No rigidity   Musculoskeletal:         General: No swelling, tenderness, deformity or signs of injury.      Cervical back: Normal range of motion and neck supple.      Right lower leg: No edema.      Left lower leg: No edema.   Skin:     General: Skin is warm and dry.      Capillary Refill: Capillary refill takes less than 2 seconds.      Coloration: Skin is not jaundiced or pale.      Findings: No bruising or erythema.   Neurological:      Mental Status: He is alert. He is disoriented.      GCS: GCS eye subscore is 4. GCS verbal subscore is 5. GCS motor subscore is 6.      Cranial Nerves: No cranial nerve deficit, dysarthria or facial asymmetry.      Sensory: No sensory deficit.      Motor: No weakness.      Comments: Complete neurological exam cannot be performed can secondary to the patient's slight confusion                 Medications in the Emergency Department:  Medications   sodium chloride 0.9 % flush 10 mL (has no administration in time range)   Naloxone HCl (NARCAN) injection 2 mg (2 mg Intravenous Given  12/9/21 0452)   sodium chloride 0.9 % bolus 1,000 mL (0 mL Intravenous Stopped 12/9/21 0621)   LORazepam (ATIVAN) injection 2 mg (2 mg Intravenous Given 12/9/21 0525)   fosphenytoin (Cerebyx) 1,590 mg PE in sodium chloride 0.9 % 100 mL IVPB (0 mg PE/kg × 106 kg Intravenous Stopped 12/9/21 0624)         Labs  Lab Results (last 24 hours)      Procedure Component Value Units Date/Time     CBC & Differential [[id number]]  (Abnormal) Collected: 12/09/21 0426     Specimen: Blood Updated: 12/09/21 0436     Narrative:       The following orders were created for panel order CBC & Differential.  Procedure                               Abnormality         Status                     ---------                               -----------         ------                     CBC Auto Differential[[id number]]        Abnormal            Final result                  Please view results for these tests on the individual orders.     Comprehensive Metabolic Panel [[id number]]  (Abnormal) Collected: 12/09/21 0426     Specimen: Blood Updated: 12/09/21 0459       Glucose 109 mg/dL         BUN 10 mg/dL         Creatinine 1.17 mg/dL         Sodium 141 mmol/L         Potassium 3.8 mmol/L         Chloride 105 mmol/L         CO2 25.7 mmol/L         Calcium 9.2 mg/dL         Total Protein 6.4 g/dL         Albumin 4.50 g/dL         ALT (SGPT) 45 U/L         AST (SGOT) 38 U/L         Alkaline Phosphatase 91 U/L         Total Bilirubin 0.2 mg/dL         eGFR Non African Amer 76 mL/min/1.73         Globulin 1.9 gm/dL         A/G Ratio 2.4 g/dL         BUN/Creatinine Ratio 8.5       Anion Gap 10.3 mmol/L       Narrative:       GFR Normal >60  Chronic Kidney Disease <60  Kidney Failure <15        CBC Auto Differential [[id number]]  (Abnormal) Collected: 12/09/21 0426     Specimen: Blood Updated: 12/09/21 0436       WBC 9.41 10*3/mm3         RBC 4.55 10*6/mm3         Hemoglobin 13.7 g/dL         Hematocrit 39.4 %         MCV 86.6 fL         MCH 30.1 pg          MCHC 34.8 g/dL         RDW 12.2 %         RDW-SD 38.5 fl         MPV 9.5 fL         Platelets 292 10*3/mm3         Neutrophil % 71.5 %         Lymphocyte % 16.4 %         Monocyte % 10.4 %         Eosinophil % 0.9 %         Basophil % 0.5 %         Immature Grans % 0.3 %         Neutrophils, Absolute 6.73 10*3/mm3         Lymphocytes, Absolute 1.54 10*3/mm3         Monocytes, Absolute 0.98 10*3/mm3         Eosinophils, Absolute 0.08 10*3/mm3         Basophils, Absolute 0.05 10*3/mm3         Immature Grans, Absolute 0.03 10*3/mm3         nRBC 0.0 /100 WBC       Protime-INR [[id number]]  (Abnormal) Collected: 12/09/21 0426     Specimen: Blood Updated: 12/09/21 0538       Protime 10.8 Seconds         INR 1.03     Narrative:       Suggested Therapeutic Ranges For Oral Anticoagulant Therapy:  Level of Therapy                      INR Target Range  Standard Dose                            2.0-3.0  High Dose                                2.5-3.5  Patients not receiving anticoagulant  Therapy Normal Range                     0.6-1.2     Troponin [[id number]]  (Normal) Collected: 12/09/21 0426     Specimen: Blood Updated: 12/09/21 0540       Troponin T <0.010 ng/mL       Narrative:       Troponin T Reference Range:  <= 0.03 ng/mL-   Negative for AMI  >0.03 ng/mL-     Abnormal for myocardial necrosis.  Clinicians would have to utilize clinical acumen, EKG, Troponin and serial changes to determine if it is an Acute Myocardial Infarction or myocardial injury due to an underlying chronic condition.         Results may be falsely decreased if patient taking Biotin.        Ethanol [[id number]] Collected: 12/09/21 0426     Specimen: Blood Updated: 12/09/21 0536       Ethanol <10 mg/dL         Ethanol % <0.010 %       Narrative:       Ethanol (Plasma)  <10 Essentially Negative     Toxic Concentrations           mg/dL     Flushing, slowing of reflexes    Impaired visual activity         Depression of CNS               >100  Possible Coma                  >300        Magnesium [[id number]]  (Normal) Collected: 12/09/21 0426     Specimen: Blood Updated: 12/09/21 0536       Magnesium 2.3 mg/dL       POC Glucose Once [[id number]]  (Abnormal) Collected: 12/09/21 0427     Specimen: Blood Updated: 12/09/21 0428       Glucose 114 mg/dL         Comment: Serial Number: [id number]:  [id number]        Urinalysis With Microscopic If Indicated (No Culture) - Urine, Clean Catch [[id number]]  (Abnormal) Collected: 12/09/21 0621     Specimen: Urine, Clean Catch Updated: 12/09/21 0633       Color, UA Yellow       Appearance, UA Clear       pH, UA 6.0       Specific Gravity, UA 1.019       Glucose, UA Negative       Ketones, UA Negative       Bilirubin, UA Negative       Blood, UA Negative       Protein, UA 30 mg/dL (1+)       Leuk Esterase, UA Negative       Nitrite, UA Negative       Urobilinogen, UA 0.2 E.U./dL     Urine Drug Screen - Urine, Clean Catch [[id number]]  (Abnormal) Collected: 12/09/21 0621     Specimen: Urine, Clean Catch Updated: 12/09/21 0715       Amphet/Methamphet, Screen Positive       Barbiturates Screen, Urine Negative       Benzodiazepine Screen, Urine Positive       Cocaine Screen, Urine Negative       Opiate Screen Negative       THC, Screen, Urine Positive       Methadone Screen, Urine Negative       Oxycodone Screen, Urine Negative     Narrative:       Negative Thresholds Per Drugs Screened:     Amphetamines                 500 ng/ml  Barbiturates                 200 ng/ml  Benzodiazepines              100 ng/ml  Cocaine                      300 ng/ml  Methadone                    300 ng/ml  Opiates                      300 ng/ml  Oxycodone                    100 ng/ml  THC                           50 ng/ml     The Normal Value for all drugs tested is negative. This report includes final unconfirmed screening results to be used for medical treatment purposes only. Unconfirmed results must not be used for non-medical  purposes such as employment or legal testing. Clinical consideration should be applied to any drug of abuse test, particularly when unconfirmed results are used.              Urinalysis, Microscopic Only - Urine, Clean Catch [[id number]] Collected: 12/09/21 0621     Specimen: Urine, Clean Catch Updated: 12/09/21 0642       RBC, UA None Seen /HPF         WBC, UA None Seen /HPF         Bacteria, UA None Seen /HPF         Squamous Epithelial Cells, UA 0-2 /HPF         Hyaline Casts, UA 3-6 /LPF         Methodology Manual Light Microscopy             Imaging:  CT Head Without Contrast   Final Result             Procedures:  Procedures     Progress                            Medical Decision Making:  MDM  Number of Diagnoses or Management Options  Status epilepticus (HCC)  Diagnosis management comments:      I was called to the patient's room at 5:20 AM.  The patient was actively having a seizure.  The patient had tonic-clonic activity.  Patient's pulse ox dropped down to 78%.  The seizure lasted for about a minute.  Patient's pulse ox returned back to 100%.  The patient was placed on oxygen.  2 mg of Ativan was administered.  A gram of Celebrex will also be ordered     The patient had no further seizures in the emergency department.  The patient's CT was normal.  When the patient returned from CT.  The patient was postictal and somnolent.  The patient does open his eyes to painful stimulus.  The patient does localize to painful stimulus.  The patient's pulse ox was 92% on 2 L.  Patient will subsequently admitted to the hospital for status epilepticus        Amount and/or Complexity of Data Reviewed  Clinical lab tests: reviewed  Tests in the radiology section of CPT®: reviewed  Tests in the medicine section of CPT®: reviewed  Discuss the patient with other providers: yes (I discussed the patient with Dr. Freeman, neurologist on-call.  He feels the patient should be admitted to the hospital for status epilepticus.  He is  comfortable with the current plan.  He will see and evaluate the patient in the hospital.  He is requesting the hospitalist admit.     I discussed case with the hospitalist, Dr. Major, who is agreeable for admission)                    Final diagnoses:   Status epilepticus (HCC)         Disposition:         ED Disposition      ED Disposition Condition Comment     Decision to Admit                 This medical record created using voice recognition software and may contain unintended errors.     Tito Arango,                  Revision History

## 2021-12-09 NOTE — H&P
[facility]   HOSPITALIST HISTORY AND PHYSICAL  Date: 2021   Patient Name: Fred Wood  : 1995  MRN: [id number]  Primary Care Physician:  Provider, No Known  Date of admission: 2021    Subjective   Subjective     Chief Complaint: Seizure and confusion started last night    HPI:    Fred Wood is a 26 y.o. male with the past medical history of substance abuse and seizure disorder on Keppra and Lamictal by a neurologist in English.  Patient family noticed generalized tonic-clonic seizure last night.  Patient had a second seizure in presence of EMS at home.  While being transferred to [facility] ED he had another seizure and was given Versed with resolution.  Patient remained confused in between.  In ED patient was given 2 mg of Narcan with improvement in his mental status but then he had a fourth seizure and was given Ativan.  Patient did drop his O2 sat to 70s and has been sleeping and lethargic since.  His urine drug screen came back positive for amphetamine, benzos and THC.  Routine blood work and CT head is without any acute finding.  Magnesium level is within normal range.  UA is negative.  Dr. Freeman from neurology was consulted and recommended observation in the hospital.  Hospitalist has been called to admit further evaluation.  When I examined the patient patient is awake, alert and oriented x3 and is wanting to go home.  Patient has not been able to refill his Keppra for a day and half.  He does acknowledge that drug abuse is also playing a role.  Apparently patient is under a lot of stress due to a bitter divorce.  His last seizure was 4 months ago.  He did bite his tongue with seizure last night but no incontinence of urine and stools.  Patient is feeling fine now.      Personal History     Past Medical History:  Past Medical History:   Diagnosis Date   • Seizures (HCC)        Past Surgical History:  History reviewed. No pertinent surgical history.    Family  History:   family history is not on file.    Social History:        Home Medications:       Allergies:  No Known Allergies    Review of Systems   All systems were reviewed and negative except for: H&P    Objective   Objective     Vitals:   Temp:  [95.8 °F (35.4 °C)] 95.8 °F (35.4 °C)  Heart Rate:  [67-88] 82  Resp:  [16] 16  BP: (108-151)/(50-82) 144/82    Physical Exam    Constitutional: Awake, alert, no acute distress   Eyes: Pupils equal, sclerae anicteric, no conjunctival injection   HENT: NCAT, mucous membranes moist   Neck: Supple, no thyromegaly, no lymphadenopathy, trachea midline   Respiratory: Clear to auscultation bilaterally, nonlabored respirations    Cardiovascular: RRR, no murmurs, rubs, or gallops, palpable pedal pulses bilaterally   Gastrointestinal: Positive bowel sounds, soft, nontender, nondistended   Musculoskeletal: No bilateral ankle edema, no clubbing or cyanosis to extremities   Psychiatric: Appropriate affect, cooperative   Neurologic: Oriented x 3, strength symmetric in all extremities, Cranial Nerves grossly intact to confrontation, speech clear   Skin: No rashes     Result Review    Result Review:  I have personally reviewed the results from the time of this admission to 12/9/2021 16:04 EST and agree with these findings:  [x]  Laboratory  []  Microbiology  [x]  Radiology  [x]  EKG/Telemetry   []  Cardiology/Vascular   []  Pathology  [x]  Old records  [x]  Other: Medications      Assessment/Plan   Assessment / Plan     Assessment:  Status epilepticus  Acute metabolic encephalopathy/postictal state  Substance abuse with meth and marijuana  Anxiety    Plan:   IV Keppra.  As needed IV Ativan.  Continue home Lamictal.  Seizure precautions and neuro checks.  Check EEG.  Await neurology input.  Observe in hospital overnight.  Discussed with ED physician and nursing staff  Patient does not want to stay in hospital.  I told him he wants to go home he needs to sign leaving AMA.         DVT  prophylaxis:  Mechanical DVT prophylaxis orders are present.  SCD    CODE STATUS:    Code Status (Patient has no pulse and is not breathing): CPR (Attempt to Resuscitate)  Medical Interventions (Patient has pulse or is breathing): Full Support      Admission Status:  I believe this patient meets observation status.    Part of this note may be an electronic transcription/translation of spoken language to printed text using the Dragon Dictation System.     Electronically signed by Praveen Major MD, 12/09/21, 4:04 PM EST.

## 2022-03-08 ENCOUNTER — HOSPITAL ENCOUNTER (EMERGENCY)
Facility: HOSPITAL | Age: 27
Discharge: HOME | End: 2022-03-08
Attending: EMERGENCY MEDICINE | Admitting: EMERGENCY MEDICINE

## 2022-03-08 VITALS
HEART RATE: 75 BPM | SYSTOLIC BLOOD PRESSURE: 123 MMHG | RESPIRATION RATE: 16 BRPM | WEIGHT: 247.14 LBS | TEMPERATURE: 97.4 F | HEIGHT: 71 IN | OXYGEN SATURATION: 98 % | DIASTOLIC BLOOD PRESSURE: 93 MMHG | BODY MASS INDEX: 34.6 KG/M2

## 2022-03-08 DIAGNOSIS — F19.10 POLYSUBSTANCE ABUSE: ICD-10-CM

## 2022-03-08 DIAGNOSIS — Z91.14 NONCOMPLIANCE WITH MEDICATION TREATMENT DUE TO OVERUSE OF MEDICATION: ICD-10-CM

## 2022-03-08 DIAGNOSIS — G40.909 RECURRENT SEIZURES: Primary | ICD-10-CM

## 2022-03-08 LAB
ALBUMIN SERPL-MCNC: 4.9 G/DL (ref 3.5–5.2)
ALBUMIN/GLOB SERPL: 2 G/DL
ALP SERPL-CCNC: 107 U/L (ref 39–117)
ALT SERPL W P-5'-P-CCNC: 41 U/L (ref 1–41)
AMPHET+METHAMPHET UR QL: POSITIVE
ANION GAP SERPL CALCULATED.3IONS-SCNC: 22 MMOL/L (ref 5–15)
APAP SERPL-MCNC: <5 MCG/ML (ref 0–30)
AST SERPL-CCNC: 28 U/L (ref 1–40)
BARBITURATES UR QL SCN: NEGATIVE
BASOPHILS # BLD AUTO: 0.08 10*3/MM3 (ref 0–0.2)
BASOPHILS NFR BLD AUTO: 0.3 % (ref 0–1.5)
BENZODIAZ UR QL SCN: NEGATIVE
BILIRUB SERPL-MCNC: 0.2 MG/DL (ref 0–1.2)
BUN SERPL-MCNC: 11 MG/DL (ref 6–20)
BUN/CREAT SERPL: 10.3 (ref 7–25)
CALCIUM SPEC-SCNC: 9.2 MG/DL (ref 8.6–10.5)
CANNABINOIDS SERPL QL: POSITIVE
CHLORIDE SERPL-SCNC: 103 MMOL/L (ref 98–107)
CO2 SERPL-SCNC: 14 MMOL/L (ref 22–29)
COCAINE UR QL: NEGATIVE
CREAT SERPL-MCNC: 1.07 MG/DL (ref 0.76–1.27)
DEPRECATED RDW RBC AUTO: 41.1 FL (ref 37–54)
EGFRCR SERPLBLD CKD-EPI 2021: 98.8 ML/MIN/1.73
EOSINOPHIL # BLD AUTO: 0.02 10*3/MM3 (ref 0–0.4)
EOSINOPHIL NFR BLD AUTO: 0.1 % (ref 0.3–6.2)
ERYTHROCYTE [DISTWIDTH] IN BLOOD BY AUTOMATED COUNT: 12.6 % (ref 12.3–15.4)
ETHANOL BLD-MCNC: <10 MG/DL (ref 0–10)
ETHANOL UR QL: <0.01 %
GLOBULIN UR ELPH-MCNC: 2.5 GM/DL
GLUCOSE SERPL-MCNC: 141 MG/DL (ref 65–99)
HCT VFR BLD AUTO: 45.8 % (ref 37.5–51)
HGB BLD-MCNC: 15 G/DL (ref 13–17.7)
IMM GRANULOCYTES # BLD AUTO: 0.19 10*3/MM3 (ref 0–0.05)
IMM GRANULOCYTES NFR BLD AUTO: 0.7 % (ref 0–0.5)
LYMPHOCYTES # BLD AUTO: 2.35 10*3/MM3 (ref 0.7–3.1)
LYMPHOCYTES NFR BLD AUTO: 9 % (ref 19.6–45.3)
MCH RBC QN AUTO: 29.8 PG (ref 26.6–33)
MCHC RBC AUTO-ENTMCNC: 32.8 G/DL (ref 31.5–35.7)
MCV RBC AUTO: 91.1 FL (ref 79–97)
METHADONE UR QL SCN: NEGATIVE
MONOCYTES # BLD AUTO: 1.99 10*3/MM3 (ref 0.1–0.9)
MONOCYTES NFR BLD AUTO: 7.6 % (ref 5–12)
NEUTROPHILS NFR BLD AUTO: 21.58 10*3/MM3 (ref 1.7–7)
NEUTROPHILS NFR BLD AUTO: 82.3 % (ref 42.7–76)
NRBC BLD AUTO-RTO: 0 /100 WBC (ref 0–0.2)
OPIATES UR QL: NEGATIVE
OXYCODONE UR QL SCN: NEGATIVE
PLATELET # BLD AUTO: 365 10*3/MM3 (ref 140–450)
PMV BLD AUTO: 9.6 FL (ref 6–12)
POTASSIUM SERPL-SCNC: 3.8 MMOL/L (ref 3.5–5.2)
PROT SERPL-MCNC: 7.4 G/DL (ref 6–8.5)
RBC # BLD AUTO: 5.03 10*6/MM3 (ref 4.14–5.8)
SALICYLATES SERPL-MCNC: <0.3 MG/DL
SODIUM SERPL-SCNC: 139 MMOL/L (ref 136–145)
WBC NRBC COR # BLD: 26.21 10*3/MM3 (ref 3.4–10.8)

## 2022-03-08 PROCEDURE — 96375 TX/PRO/DX INJ NEW DRUG ADDON: CPT

## 2022-03-08 PROCEDURE — 80143 DRUG ASSAY ACETAMINOPHEN: CPT | Performed by: EMERGENCY MEDICINE

## 2022-03-08 PROCEDURE — 80053 COMPREHEN METABOLIC PANEL: CPT | Performed by: EMERGENCY MEDICINE

## 2022-03-08 PROCEDURE — 85025 COMPLETE CBC W/AUTO DIFF WBC: CPT | Performed by: EMERGENCY MEDICINE

## 2022-03-08 PROCEDURE — 80307 DRUG TEST PRSMV CHEM ANLYZR: CPT | Performed by: EMERGENCY MEDICINE

## 2022-03-08 PROCEDURE — 0 LEVETIRACETAM IN NACL 0.54% 1500 MG/100ML SOLUTION: Performed by: EMERGENCY MEDICINE

## 2022-03-08 PROCEDURE — 96374 THER/PROPH/DIAG INJ IV PUSH: CPT

## 2022-03-08 PROCEDURE — 99283 EMERGENCY DEPT VISIT LOW MDM: CPT

## 2022-03-08 PROCEDURE — 25010000002 DIAZEPAM PER 5 MG: Performed by: EMERGENCY MEDICINE

## 2022-03-08 PROCEDURE — 80179 DRUG ASSAY SALICYLATE: CPT | Performed by: EMERGENCY MEDICINE

## 2022-03-08 PROCEDURE — 25010000002 KETOROLAC TROMETHAMINE PER 15 MG: Performed by: EMERGENCY MEDICINE

## 2022-03-08 PROCEDURE — 25010000002 ONDANSETRON PER 1 MG

## 2022-03-08 PROCEDURE — 99284 EMERGENCY DEPT VISIT MOD MDM: CPT

## 2022-03-08 PROCEDURE — 82077 ASSAY SPEC XCP UR&BREATH IA: CPT | Performed by: EMERGENCY MEDICINE

## 2022-03-08 RX ORDER — ONDANSETRON 2 MG/ML
INJECTION INTRAMUSCULAR; INTRAVENOUS
Status: COMPLETED
Start: 2022-03-08 | End: 2022-03-08

## 2022-03-08 RX ORDER — DIAZEPAM 5 MG/ML
5 INJECTION, SOLUTION INTRAMUSCULAR; INTRAVENOUS ONCE
Status: COMPLETED | OUTPATIENT
Start: 2022-03-08 | End: 2022-03-08

## 2022-03-08 RX ORDER — ONDANSETRON 2 MG/ML
4 INJECTION INTRAMUSCULAR; INTRAVENOUS ONCE
Status: COMPLETED | OUTPATIENT
Start: 2022-03-08 | End: 2022-03-08

## 2022-03-08 RX ORDER — LEVETIRACETAM 15 MG/ML
1500 INJECTION INTRAVASCULAR EVERY 12 HOURS SCHEDULED
Status: DISCONTINUED | OUTPATIENT
Start: 2022-03-08 | End: 2022-03-08 | Stop reason: HOSPADM

## 2022-03-08 RX ORDER — KETOROLAC TROMETHAMINE 30 MG/ML
30 INJECTION, SOLUTION INTRAMUSCULAR; INTRAVENOUS ONCE
Status: COMPLETED | OUTPATIENT
Start: 2022-03-08 | End: 2022-03-08

## 2022-03-08 RX ORDER — SODIUM CHLORIDE 0.9 % (FLUSH) 0.9 %
10 SYRINGE (ML) INJECTION AS NEEDED
Status: DISCONTINUED | OUTPATIENT
Start: 2022-03-08 | End: 2022-03-08 | Stop reason: HOSPADM

## 2022-03-08 RX ADMIN — ONDANSETRON 4 MG: 2 INJECTION INTRAMUSCULAR; INTRAVENOUS at 10:26

## 2022-03-08 RX ADMIN — KETOROLAC TROMETHAMINE 30 MG: 30 INJECTION, SOLUTION INTRAMUSCULAR; INTRAVENOUS at 15:25

## 2022-03-08 RX ADMIN — LEVETIRACETAM 1500 MG: 15 INJECTION, SOLUTION INTRAVENOUS at 12:24

## 2022-03-08 RX ADMIN — DIAZEPAM 5 MG: 5 INJECTION, SOLUTION INTRAMUSCULAR; INTRAVENOUS at 12:04

## 2022-03-08 NOTE — DISCHARGE INSTRUCTIONS
Avoid driving or operating any machinery or equipment until cleared by your seizure doctor.  Take your seizure medications as prescribed.  Do not miss or skip any doses.  Do not use or abuse any illicit drugs or medications that are not prescribed to you.  Return to the ER for any new or changing symptoms.

## 2022-03-08 NOTE — ED PROVIDER NOTES
"Time: 3:04 PM EST  Arrived by: EMS  Chief Complaint: Seizure  History provided by: EMS and patient significant other  History is limited by: Postictal state      History of Present Illness:  Patient is a 26 y.o.  male that presents to the emergency department after having seizure activity.  EMS reports that patient has had multiple seizures.  The patient has also reportedly been off of his seizure medications for several weeks.  After reviewing patient's past medical history he has been in the ER and signed out AMA on December 9, 2021 after having seizures.  The patient significant other states he has not had his medication for several days.  She denies any trauma or injury.  Upon arrival the patient is postictal-he is awake and moving all extremities, yelling out unable to answer any questions appropriately.    HPI    Patient Care Team  Primary Care Provider: Provider, No Known    Past Medical History:     No Known Allergies  Past Medical History:   Diagnosis Date   • Seizures (HCC)      History reviewed. No pertinent surgical history.  History reviewed. No pertinent family history.    Home Medications:  Prior to Admission medications    Not on File        Social History:        Review of Systems:  Review of Systems   Reason unable to perform ROS: Postictal state.          Physical Exam:  /93 (BP Location: Right arm, Patient Position: Lying)   Pulse 90   Temp 97.4 °F (36.3 °C) (Oral)   Resp 15   Ht 180.3 cm (71\")   Wt 112 kg (247 lb 2.2 oz)   SpO2 92%   BMI 34.47 kg/m²     Physical Exam Vital signs were reviewed under triage note.  General appearance - Patient appears well-developed and well-nourished.  Patient has altered mental status secondary to being postictal.  Head - Normocephalic, atraumatic.  Pupils - Equal, round, reactive to light.  Extraocular muscles are intact.  Conjunctive is clear.  Nasal - Normal inspection.  No evidence of trauma or epistaxis.  Tympanic membranes - Gray, intact " without erythema or retractions.  Oral mucosa - Pink and moist without lesions or erythema.  Uvula is midline.  Chest wall - Atraumatic.  Chest wall is nontender.  There is no vesicular rashes noted.  Neck - Supple.  Trachea was midline.  There is no palpable lymphadenopathy or thyromegaly.  There are no meningeal signs  Lungs - Clear to auscultation and percussion bilaterally.  Heart - Regular rate and rhythm without any murmurs, clicks, or gallops.  Abdomen - Soft.  Bowel sounds are present.  There is no palpable tenderness.  There is no rebound, guarding, or rigidity.  There are no palpable masses.  There are no pulsatile masses.  Back - Spine is straight and midline.  There is no CVA tenderness.  Extremities - Intact x4 with full range of motion.  There is no palpable edema.  Pulses are intact x4 and equal.  Neurologic -initial neuro exam patient is altered with what appears to be a post ictal state.  He does move all 4 extremities.  He does not follow commands or answer questions.  Integument - There are no rashes.  There are no petechia or purpura lesions noted.  There are no vesicular lesions noted.            Medications in the Emergency Department:  Medications   sodium chloride 0.9 % flush 10 mL (has no administration in time range)   levETIRAcetam in NaCl 0.54% (KEPPRA) IVPB 1,500 mg (0 mg Intravenous Stopped 3/8/22 1251)   ketorolac (TORADOL) injection 30 mg (has no administration in time range)   ondansetron (ZOFRAN) injection 4 mg (4 mg Intravenous Given 3/8/22 1026)   diazePAM (VALIUM) injection 5 mg (5 mg Intravenous Given 3/8/22 1204)        Labs  Lab Results (last 24 hours)     Procedure Component Value Units Date/Time    CBC & Differential [[id number]]  (Abnormal) Collected: 03/08/22 1213    Specimen: Blood Updated: 03/08/22 1221    Narrative:      The following orders were created for panel order CBC & Differential.  Procedure                               Abnormality         Status                      ---------                               -----------         ------                     CBC Auto Differential[[id number]]        Abnormal            Final result                 Please view results for these tests on the individual orders.    Comprehensive Metabolic Panel [[id number]]  (Abnormal) Collected: 03/08/22 1213    Specimen: Blood Updated: 03/08/22 1237     Glucose 141 mg/dL      BUN 11 mg/dL      Creatinine 1.07 mg/dL      Sodium 139 mmol/L      Potassium 3.8 mmol/L      Chloride 103 mmol/L      CO2 14.0 mmol/L      Calcium 9.2 mg/dL      Total Protein 7.4 g/dL      Albumin 4.90 g/dL      ALT (SGPT) 41 U/L      AST (SGOT) 28 U/L      Alkaline Phosphatase 107 U/L      Total Bilirubin 0.2 mg/dL      Globulin 2.5 gm/dL      A/G Ratio 2.0 g/dL      BUN/Creatinine Ratio 10.3     Anion Gap 22.0 mmol/L      eGFR 98.8 mL/min/1.73      Comment: National Kidney Foundation and American Society of Nephrology (ASN) Task Force recommended calculation based on the Chronic Kidney Disease Epidemiology Collaboration (CKD-EPI) equation refit without adjustment for race.       Narrative:      GFR Normal >60  Chronic Kidney Disease <60  Kidney Failure <15      Acetaminophen Level [[id number]]  (Normal) Collected: 03/08/22 1213    Specimen: Blood Updated: 03/08/22 1237     Acetaminophen <5.0 mcg/mL     Ethanol [[id number]] Collected: 03/08/22 1213    Specimen: Blood Updated: 03/08/22 1237     Ethanol <10 mg/dL      Ethanol % <0.010 %     Narrative:      Ethanol (Plasma)  <10 Essentially Negative    Toxic Concentrations           mg/dL    Flushing, slowing of reflexes    Impaired visual activity         Depression of CNS              >100  Possible Coma                  >300       Salicylate Level [[id number]]  (Normal) Collected: 03/08/22 1213    Specimen: Blood Updated: 03/08/22 1237     Salicylate <0.3 mg/dL     CBC Auto Differential [[id number]]  (Abnormal) Collected: 03/08/22 1213    Specimen: Blood Updated:  03/08/22 1221     WBC 26.21 10*3/mm3      RBC 5.03 10*6/mm3      Hemoglobin 15.0 g/dL      Hematocrit 45.8 %      MCV 91.1 fL      MCH 29.8 pg      MCHC 32.8 g/dL      RDW 12.6 %      RDW-SD 41.1 fl      MPV 9.6 fL      Platelets 365 10*3/mm3      Neutrophil % 82.3 %      Lymphocyte % 9.0 %      Monocyte % 7.6 %      Eosinophil % 0.1 %      Basophil % 0.3 %      Immature Grans % 0.7 %      Neutrophils, Absolute 21.58 10*3/mm3      Lymphocytes, Absolute 2.35 10*3/mm3      Monocytes, Absolute 1.99 10*3/mm3      Eosinophils, Absolute 0.02 10*3/mm3      Basophils, Absolute 0.08 10*3/mm3      Immature Grans, Absolute 0.19 10*3/mm3      nRBC 0.0 /100 WBC     Urine Drug Screen - Urine, Clean Catch [[id number]]  (Abnormal) Collected: 03/08/22 1224    Specimen: Urine, Clean Catch Updated: 03/08/22 1253     Amphet/Methamphet, Screen Positive     Barbiturates Screen, Urine Negative     Benzodiazepine Screen, Urine Negative     Cocaine Screen, Urine Negative     Opiate Screen Negative     THC, Screen, Urine Positive     Methadone Screen, Urine Negative     Oxycodone Screen, Urine Negative    Narrative:      Negative Thresholds Per Drugs Screened:    Amphetamines                 500 ng/ml  Barbiturates                 200 ng/ml  Benzodiazepines              100 ng/ml  Cocaine                      300 ng/ml  Methadone                    300 ng/ml  Opiates                      300 ng/ml  Oxycodone                    100 ng/ml  THC                           50 ng/ml    The Normal Value for all drugs tested is negative. This report includes final unconfirmed screening results to be used for medical treatment purposes only. Unconfirmed results must not be used for non-medical purposes such as employment or legal testing. Clinical consideration should be applied to any drug of abuse test, particularly when unconfirmed results are used.                   Imaging:  No Radiology Exams Resulted Within Past 24 Hours      EKG:      Procedures:  Procedures    Progress                      The patient was seen and evaluated in the ED by me.  The above history and physical examination was performed as document.  The history was confirmed to him being noncompliant with her medications by the female whom he is currently with in the room.  Patient did have a witnessed seizure here that lasted approximately 30 seconds and was treated with Valium.  Patient was loaded up with Keppra.    The patient was monitored for several hours and had no additional seizure activities.  Patient went through his postictal state became fully awake and alert.  Began complaining of a headache and was given a dose of Toradol.  After further monitoring the patient is awake, alert, and oriented.  He has ambulated to the bathroom without difficulty.  Patient this time is stable for discharge home with his significant other.  Patient reportedly has his prescription at home that he is noncompliant with and was instructed that he needs to take his medications as prescribed or else he will have additional seizures and will be back in the ER even worse cause self-harm or death.  At this time patient will be discharged home with his significant other.      Medical Decision Making:  MDM     Final diagnoses:   Recurrent seizures (HCC)   Noncompliance with medication treatment due to overuse of medication   Polysubstance abuse (HCC)        Disposition:  ED Disposition     ED Disposition   Discharge    Condition   Stable    Comment   --              Pola De Leon DO  03/08/22 2034

## 2022-09-01 ENCOUNTER — APPOINTMENT (OUTPATIENT)
Dept: GENERAL RADIOLOGY | Facility: HOSPITAL | Age: 27
End: 2022-09-01

## 2022-09-01 ENCOUNTER — HOSPITAL ENCOUNTER (EMERGENCY)
Facility: HOSPITAL | Age: 27
Discharge: HOME | End: 2022-09-01
Attending: EMERGENCY MEDICINE | Admitting: EMERGENCY MEDICINE

## 2022-09-01 ENCOUNTER — APPOINTMENT (OUTPATIENT)
Dept: CT IMAGING | Facility: HOSPITAL | Age: 27
End: 2022-09-01

## 2022-09-01 ENCOUNTER — HOSPITAL ENCOUNTER (OUTPATIENT)
Facility: HOSPITAL | Age: 27
Setting detail: OBSERVATION
Discharge: HOME | End: 2022-09-02
Attending: EMERGENCY MEDICINE | Admitting: STUDENT IN AN ORGANIZED HEALTH CARE EDUCATION/TRAINING PROGRAM

## 2022-09-01 VITALS
BODY MASS INDEX: 32.69 KG/M2 | WEIGHT: 234.35 LBS | TEMPERATURE: 98.4 F | HEART RATE: 66 BPM | DIASTOLIC BLOOD PRESSURE: 79 MMHG | OXYGEN SATURATION: 98 % | RESPIRATION RATE: 20 BRPM | SYSTOLIC BLOOD PRESSURE: 142 MMHG

## 2022-09-01 DIAGNOSIS — G40.919 BREAKTHROUGH SEIZURE: Primary | ICD-10-CM

## 2022-09-01 DIAGNOSIS — F15.10 METHAMPHETAMINE ABUSE: ICD-10-CM

## 2022-09-01 DIAGNOSIS — G40.909 RECURRENT SEIZURES: Primary | ICD-10-CM

## 2022-09-01 LAB
ALBUMIN SERPL-MCNC: 4.7 G/DL (ref 3.5–5.2)
ALBUMIN/GLOB SERPL: 1.9 G/DL
ALP SERPL-CCNC: 97 U/L (ref 39–117)
ALT SERPL W P-5'-P-CCNC: 27 U/L (ref 1–41)
AMMONIA BLD-SCNC: 32 UMOL/L (ref 16–60)
AMPHET+METHAMPHET UR QL: POSITIVE
ANION GAP SERPL CALCULATED.3IONS-SCNC: 11.8 MMOL/L (ref 5–15)
AST SERPL-CCNC: 22 U/L (ref 1–40)
BARBITURATES UR QL SCN: NEGATIVE
BASOPHILS # BLD AUTO: 0.06 10*3/MM3 (ref 0–0.2)
BASOPHILS NFR BLD AUTO: 0.3 % (ref 0–1.5)
BENZODIAZ UR QL SCN: NEGATIVE
BILIRUB SERPL-MCNC: 0.2 MG/DL (ref 0–1.2)
BUN SERPL-MCNC: 12 MG/DL (ref 6–20)
BUN/CREAT SERPL: 14.1 (ref 7–25)
CALCIUM SPEC-SCNC: 9.4 MG/DL (ref 8.6–10.5)
CANNABINOIDS SERPL QL: POSITIVE
CHLORIDE SERPL-SCNC: 101 MMOL/L (ref 98–107)
CO2 SERPL-SCNC: 22.2 MMOL/L (ref 22–29)
COCAINE UR QL: NEGATIVE
CREAT SERPL-MCNC: 0.85 MG/DL (ref 0.76–1.27)
DEPRECATED RDW RBC AUTO: 38.1 FL (ref 37–54)
EGFRCR SERPLBLD CKD-EPI 2021: 123.7 ML/MIN/1.73
EOSINOPHIL # BLD AUTO: 0.02 10*3/MM3 (ref 0–0.4)
EOSINOPHIL NFR BLD AUTO: 0.1 % (ref 0.3–6.2)
ERYTHROCYTE [DISTWIDTH] IN BLOOD BY AUTOMATED COUNT: 12.2 % (ref 12.3–15.4)
ETHANOL BLD-MCNC: <10 MG/DL (ref 0–10)
ETHANOL UR QL: <0.01 %
GLOBULIN UR ELPH-MCNC: 2.5 GM/DL
GLUCOSE BLDC GLUCOMTR-MCNC: 115 MG/DL (ref 70–99)
GLUCOSE BLDC GLUCOMTR-MCNC: 133 MG/DL (ref 70–99)
GLUCOSE SERPL-MCNC: 141 MG/DL (ref 65–99)
HCT VFR BLD AUTO: 45.9 % (ref 37.5–51)
HGB BLD-MCNC: 16 G/DL (ref 13–17.7)
HOLD SPECIMEN: NORMAL
HOLD SPECIMEN: NORMAL
IMM GRANULOCYTES # BLD AUTO: 0.09 10*3/MM3 (ref 0–0.05)
IMM GRANULOCYTES NFR BLD AUTO: 0.4 % (ref 0–0.5)
LYMPHOCYTES # BLD AUTO: 0.95 10*3/MM3 (ref 0.7–3.1)
LYMPHOCYTES NFR BLD AUTO: 4.2 % (ref 19.6–45.3)
MAGNESIUM SERPL-MCNC: 2.3 MG/DL (ref 1.6–2.6)
MCH RBC QN AUTO: 29.7 PG (ref 26.6–33)
MCHC RBC AUTO-ENTMCNC: 34.9 G/DL (ref 31.5–35.7)
MCV RBC AUTO: 85.3 FL (ref 79–97)
METHADONE UR QL SCN: NEGATIVE
MONOCYTES # BLD AUTO: 2.01 10*3/MM3 (ref 0.1–0.9)
MONOCYTES NFR BLD AUTO: 9 % (ref 5–12)
NEUTROPHILS NFR BLD AUTO: 19.25 10*3/MM3 (ref 1.7–7)
NEUTROPHILS NFR BLD AUTO: 86 % (ref 42.7–76)
NRBC BLD AUTO-RTO: 0 /100 WBC (ref 0–0.2)
OPIATES UR QL: NEGATIVE
OXYCODONE UR QL SCN: NEGATIVE
PLATELET # BLD AUTO: 307 10*3/MM3 (ref 140–450)
PMV BLD AUTO: 9.4 FL (ref 6–12)
POTASSIUM SERPL-SCNC: 5.2 MMOL/L (ref 3.5–5.2)
PROT SERPL-MCNC: 7.2 G/DL (ref 6–8.5)
RBC # BLD AUTO: 5.38 10*6/MM3 (ref 4.14–5.8)
SODIUM SERPL-SCNC: 135 MMOL/L (ref 136–145)
TROPONIN T SERPL-MCNC: <0.01 NG/ML (ref 0–0.03)
WBC NRBC COR # BLD: 22.38 10*3/MM3 (ref 3.4–10.8)
WHOLE BLOOD HOLD COAG: NORMAL
WHOLE BLOOD HOLD SPECIMEN: NORMAL

## 2022-09-01 PROCEDURE — 80307 DRUG TEST PRSMV CHEM ANLYZR: CPT | Performed by: EMERGENCY MEDICINE

## 2022-09-01 PROCEDURE — 82140 ASSAY OF AMMONIA: CPT | Performed by: EMERGENCY MEDICINE

## 2022-09-01 PROCEDURE — 93010 ELECTROCARDIOGRAM REPORT: CPT | Performed by: INTERNAL MEDICINE

## 2022-09-01 PROCEDURE — 0 LEVETIRACETAM IN NACL 0.75% 1000 MG/100ML SOLUTION: Performed by: EMERGENCY MEDICINE

## 2022-09-01 PROCEDURE — 93005 ELECTROCARDIOGRAM TRACING: CPT | Performed by: EMERGENCY MEDICINE

## 2022-09-01 PROCEDURE — 96375 TX/PRO/DX INJ NEW DRUG ADDON: CPT

## 2022-09-01 PROCEDURE — 25010000002 LORAZEPAM PER 2 MG: Performed by: EMERGENCY MEDICINE

## 2022-09-01 PROCEDURE — G0378 HOSPITAL OBSERVATION PER HR: HCPCS

## 2022-09-01 PROCEDURE — 96374 THER/PROPH/DIAG INJ IV PUSH: CPT

## 2022-09-01 PROCEDURE — 84484 ASSAY OF TROPONIN QUANT: CPT | Performed by: EMERGENCY MEDICINE

## 2022-09-01 PROCEDURE — 71045 X-RAY EXAM CHEST 1 VIEW: CPT

## 2022-09-01 PROCEDURE — 85025 COMPLETE CBC W/AUTO DIFF WBC: CPT | Performed by: EMERGENCY MEDICINE

## 2022-09-01 PROCEDURE — 25010000002 ONDANSETRON PER 1 MG: Performed by: EMERGENCY MEDICINE

## 2022-09-01 PROCEDURE — 82962 GLUCOSE BLOOD TEST: CPT

## 2022-09-01 PROCEDURE — 82077 ASSAY SPEC XCP UR&BREATH IA: CPT | Performed by: EMERGENCY MEDICINE

## 2022-09-01 PROCEDURE — 99220 PR INITIAL OBSERVATION CARE/DAY 70 MINUTES: CPT | Performed by: HOSPITALIST

## 2022-09-01 PROCEDURE — 70450 CT HEAD/BRAIN W/O DYE: CPT

## 2022-09-01 PROCEDURE — 99284 EMERGENCY DEPT VISIT MOD MDM: CPT

## 2022-09-01 PROCEDURE — 80053 COMPREHEN METABOLIC PANEL: CPT | Performed by: EMERGENCY MEDICINE

## 2022-09-01 PROCEDURE — 83735 ASSAY OF MAGNESIUM: CPT | Performed by: EMERGENCY MEDICINE

## 2022-09-01 RX ORDER — LORAZEPAM 2 MG/ML
1 INJECTION INTRAMUSCULAR ONCE
Status: COMPLETED | OUTPATIENT
Start: 2022-09-01 | End: 2022-09-01

## 2022-09-01 RX ORDER — ONDANSETRON 2 MG/ML
4 INJECTION INTRAMUSCULAR; INTRAVENOUS ONCE
Status: COMPLETED | OUTPATIENT
Start: 2022-09-01 | End: 2022-09-01

## 2022-09-01 RX ORDER — ACETAMINOPHEN 325 MG/1
650 TABLET ORAL ONCE
Status: COMPLETED | OUTPATIENT
Start: 2022-09-01 | End: 2022-09-01

## 2022-09-01 RX ORDER — LEVETIRACETAM 750 MG/1
750 TABLET ORAL 2 TIMES DAILY
COMMUNITY
End: 2022-09-02 | Stop reason: HOSPADM

## 2022-09-01 RX ORDER — PROPRANOLOL HYDROCHLORIDE 40 MG/1
40 TABLET ORAL 2 TIMES DAILY
COMMUNITY

## 2022-09-01 RX ORDER — LEVETIRACETAM 10 MG/ML
1000 INJECTION INTRAVASCULAR ONCE
Status: COMPLETED | OUTPATIENT
Start: 2022-09-01 | End: 2022-09-01

## 2022-09-01 RX ORDER — SODIUM CHLORIDE 0.9 % (FLUSH) 0.9 %
10 SYRINGE (ML) INJECTION AS NEEDED
Status: DISCONTINUED | OUTPATIENT
Start: 2022-09-01 | End: 2022-09-01 | Stop reason: HOSPADM

## 2022-09-01 RX ORDER — OXCARBAZEPINE 600 MG/1
600 TABLET, FILM COATED ORAL 2 TIMES DAILY
COMMUNITY

## 2022-09-01 RX ORDER — ONDANSETRON 2 MG/ML
4 INJECTION INTRAMUSCULAR; INTRAVENOUS ONCE
Status: DISCONTINUED | OUTPATIENT
Start: 2022-09-01 | End: 2022-09-01

## 2022-09-01 RX ADMIN — SODIUM CHLORIDE 1000 ML: 9 INJECTION, SOLUTION INTRAVENOUS at 15:24

## 2022-09-01 RX ADMIN — ONDANSETRON 4 MG: 2 INJECTION INTRAMUSCULAR; INTRAVENOUS at 15:23

## 2022-09-01 RX ADMIN — LORAZEPAM 1 MG: 2 INJECTION INTRAMUSCULAR; INTRAVENOUS at 21:52

## 2022-09-01 RX ADMIN — ACETAMINOPHEN 650 MG: 325 TABLET ORAL at 17:17

## 2022-09-01 RX ADMIN — LEVETIRACETAM 1000 MG: 10 INJECTION, SOLUTION INTRAVENOUS at 21:59

## 2022-09-01 NOTE — DISCHARGE INSTRUCTIONS
Please stop using methamphetamine.    Please call your neurologist tomorrow to discuss your seizure medication.    Return to the emergency room for recurrent seizure, altered mental status, passing out, near passing out, chest pain, chest pressure, shortness of breath, altered mental status, fever or any new symptoms you are concerned with

## 2022-09-01 NOTE — ED PROVIDER NOTES
Time: 2:54 PM EDT  Arrived by: ambulance  Chief Complaint: drug overdose, seizure  History provided by: nurse, no family available  History is limited by: N/A     History of Present Illness:  Patient is a 26 y.o. year old male that presents to the emergency department with seizures and drug overdose. Pt's family witnessed pt having a seizure.  The patient has known epilepsy.  At this time it is unknown whether the patient takes his seizure medicine as prescribed.  Family is not present.  Pt reports having a headache, SOB, and vomiting. Pt takes Keppra and Lamictal according to the chart. Pt's family reported to EMS that pt uses meth and has had recent use..    Similar Symptoms Previously: yes  Recently seen: yes      Patient Care Team  Primary Care Provider: Provider, No Known    Past Medical History:     No Known Allergies  Past Medical History:   Diagnosis Date   • Seizures (HCC)      History reviewed. No pertinent surgical history.  History reviewed. No pertinent family history.    Home Medications:  Prior to Admission medications    Not on File        Social History:   Social History     Substance Use Topics   • Drug use: Yes     Types: Methamphetamines         Review of Systems:  Review of Systems   Unable to perform ROS: Mental status change        Physical Exam:  /79 (BP Location: Left arm, Patient Position: Lying)   Pulse 66   Temp 98.4 °F (36.9 °C) (Oral)   Resp 20   Wt 106 kg (234 lb 5.6 oz)   SpO2 98%   BMI 32.69 kg/m²     Physical Exam  Vitals and nursing note reviewed.   Constitutional:       General: He is not in acute distress.     Appearance: Normal appearance. He is not ill-appearing, toxic-appearing or diaphoretic.   HENT:      Head: Normocephalic and atraumatic.      Mouth/Throat:      Mouth: Mucous membranes are moist.   Eyes:      Pupils: Pupils are equal, round, and reactive to light.   Cardiovascular:      Rate and Rhythm: Normal rate and regular rhythm.      Pulses: Normal pulses.            Carotid pulses are 2+ on the right side and 2+ on the left side.       Radial pulses are 2+ on the right side and 2+ on the left side.        Femoral pulses are 2+ on the right side and 2+ on the left side.       Popliteal pulses are 2+ on the right side and 2+ on the left side.        Dorsalis pedis pulses are 2+ on the right side and 2+ on the left side.        Posterior tibial pulses are 2+ on the right side and 2+ on the left side.      Heart sounds: Normal heart sounds. No murmur heard.  Pulmonary:      Effort: Pulmonary effort is normal. No accessory muscle usage, respiratory distress or retractions.      Breath sounds: Normal breath sounds. No wheezing, rhonchi or rales.   Abdominal:      General: Abdomen is flat. There is no distension.      Palpations: Abdomen is soft. There is no mass.      Tenderness: There is no abdominal tenderness. There is no right CVA tenderness, left CVA tenderness, guarding or rebound.      Comments: No rigidity   Musculoskeletal:         General: No swelling, tenderness or deformity.      Cervical back: Neck supple. No tenderness.      Right lower leg: No edema.      Left lower leg: No edema.   Skin:     General: Skin is warm and dry.      Capillary Refill: Capillary refill takes less than 2 seconds.      Coloration: Skin is not jaundiced or pale.      Findings: No erythema.   Neurological:      General: No focal deficit present.      Mental Status: He is alert and oriented to person, place, and time. Mental status is at baseline.      Sensory: No sensory deficit.      Motor: No weakness.   Psychiatric:         Mood and Affect: Mood normal.         Behavior: Behavior normal.                Medications in the Emergency Department:  Medications   ondansetron (ZOFRAN) injection 4 mg (4 mg Intravenous Given 9/1/22 1523)   sodium chloride 0.9 % bolus 1,000 mL (0 mL Intravenous Stopped 9/1/22 1614)   acetaminophen (TYLENOL) tablet 650 mg (650 mg Oral Given 9/1/22 1717)         Labs  Lab Results (last 24 hours)     Procedure Component Value Units Date/Time    CBC & Differential [[id number]]  (Abnormal) Collected: 09/01/22 1513    Specimen: Blood Updated: 09/01/22 1521    Narrative:      The following orders were created for panel order CBC & Differential.  Procedure                               Abnormality         Status                     ---------                               -----------         ------                     CBC Auto Differential[[id number]]        Abnormal            Final result                 Please view results for these tests on the individual orders.    Comprehensive Metabolic Panel [[id number]]  (Abnormal) Collected: 09/01/22 1513    Specimen: Blood Updated: 09/01/22 1539     Glucose 141 mg/dL      BUN 12 mg/dL      Creatinine 0.85 mg/dL      Sodium 135 mmol/L      Potassium 5.2 mmol/L      Chloride 101 mmol/L      CO2 22.2 mmol/L      Calcium 9.4 mg/dL      Total Protein 7.2 g/dL      Albumin 4.70 g/dL      ALT (SGPT) 27 U/L      AST (SGOT) 22 U/L      Alkaline Phosphatase 97 U/L      Total Bilirubin 0.2 mg/dL      Globulin 2.5 gm/dL      A/G Ratio 1.9 g/dL      BUN/Creatinine Ratio 14.1     Anion Gap 11.8 mmol/L      eGFR 123.7 mL/min/1.73      Comment: National Kidney Foundation and American Society of Nephrology (ASN) Task Force recommended calculation based on the Chronic Kidney Disease Epidemiology Collaboration (CKD-EPI) equation refit without adjustment for race.       Narrative:      GFR Normal >60  Chronic Kidney Disease <60  Kidney Failure <15      CBC Auto Differential [[id number]]  (Abnormal) Collected: 09/01/22 1513    Specimen: Blood Updated: 09/01/22 1521     WBC 22.38 10*3/mm3      RBC 5.38 10*6/mm3      Hemoglobin 16.0 g/dL      Hematocrit 45.9 %      MCV 85.3 fL      MCH 29.7 pg      MCHC 34.9 g/dL      RDW 12.2 %      RDW-SD 38.1 fl      MPV 9.4 fL      Platelets 307 10*3/mm3      Neutrophil % 86.0 %      Lymphocyte % 4.2 %      Monocyte % 9.0  %      Eosinophil % 0.1 %      Basophil % 0.3 %      Immature Grans % 0.4 %      Neutrophils, Absolute 19.25 10*3/mm3      Lymphocytes, Absolute 0.95 10*3/mm3      Monocytes, Absolute 2.01 10*3/mm3      Eosinophils, Absolute 0.02 10*3/mm3      Basophils, Absolute 0.06 10*3/mm3      Immature Grans, Absolute 0.09 10*3/mm3      nRBC 0.0 /100 WBC     Ammonia [[id number]]  (Normal) Collected: 09/01/22 1513    Specimen: Blood Updated: 09/01/22 1541     Ammonia 32 umol/L     Magnesium [[id number]]  (Normal) Collected: 09/01/22 1513    Specimen: Blood Updated: 09/01/22 1539     Magnesium 2.3 mg/dL     Ethanol [[id number]] Collected: 09/01/22 1513    Specimen: Blood Updated: 09/01/22 1539     Ethanol <10 mg/dL      Ethanol % <0.010 %     Narrative:      Ethanol (Plasma)  <10 Essentially Negative    Toxic Concentrations           mg/dL    Flushing, slowing of reflexes    Impaired visual activity         Depression of CNS              >100  Possible Coma                  >300       Troponin [[id number]]  (Normal) Collected: 09/01/22 1513    Specimen: Blood Updated: 09/01/22 1539     Troponin T <0.010 ng/mL     Narrative:      Troponin T Reference Range:  <= 0.03 ng/mL-   Negative for AMI  >0.03 ng/mL-     Abnormal for myocardial necrosis.  Clinicians would have to utilize clinical acumen, EKG, Troponin and serial changes to determine if it is an Acute Myocardial Infarction or myocardial injury due to an underlying chronic condition.       Results may be falsely decreased if patient taking Biotin.      Urine Drug Screen - Urine, Clean Catch [[id number]]  (Abnormal) Collected: 09/01/22 1603    Specimen: Urine, Clean Catch Updated: 09/01/22 1631     Amphet/Methamphet, Screen Positive     Barbiturates Screen, Urine Negative     Benzodiazepine Screen, Urine Negative     Cocaine Screen, Urine Negative     Opiate Screen Negative     THC, Screen, Urine Positive     Methadone Screen, Urine Negative     Oxycodone Screen, Urine  Negative    Narrative:      Negative Thresholds Per Drugs Screened:    Amphetamines                 500 ng/ml  Barbiturates                 200 ng/ml  Benzodiazepines              100 ng/ml  Cocaine                      300 ng/ml  Methadone                    300 ng/ml  Opiates                      300 ng/ml  Oxycodone                    100 ng/ml  THC                           50 ng/ml    The Normal Value for all drugs tested is negative. This report includes final unconfirmed screening results to be used for medical treatment purposes only. Unconfirmed results must not be used for non-medical purposes such as employment or legal testing. Clinical consideration should be applied to any drug of abuse test, particularly when unconfirmed results are used.            POC Glucose Once [[id number]]  (Abnormal) Collected: 09/01/22 1951    Specimen: Blood Updated: 09/01/22 1952     Glucose 115 mg/dL      Comment: Serial Number: [id number]:  [id number]       POC Glucose Once [[id number]]  (Abnormal) Collected: 09/01/22 2150    Specimen: Blood Updated: 09/01/22 2154     Glucose 133 mg/dL      Comment: Serial Number: [id number]:  [id number]              Imaging:  CT Head Without Contrast    Result Date: 9/1/2022  PROCEDURE: CT HEAD WO CONTRAST  COMPARISON:  [facility], CT, CT HEAD WO CONTRAST, 12/09/2021, 5:34. INDICATIONS: headache  PROTOCOL:   Standard imaging protocol performed    RADIATION:   DLP: 954.6mGy*cm   MA and/or KV was adjusted to minimize radiation dose.     TECHNIQUE: Axial images of the head without intravenous contrast.  FINDINGS:  The ventricles have a normal size and configuration. There is no evidence of acute intracranial hemorrhage, mass or midline shift. No extra-axial fluid collections are identified. There are no skull fractures. The visualized paranasal sinuses and mastoid air cells are grossly clear.  IMPRESSION: Normal exam.  OUMOU DARDEN MD       Electronically Signed  and Approved By: OUMOU DARDEN MD on 9/01/2022 at 16:48             XR Chest 1 View    Result Date: 9/1/2022  PROCEDURE: XR CHEST 1 VW  COMPARISON: [facility], CR, CHEST PA/AP & LAT 2V, 6/14/2006, 21:08.  INDICATIONS: DRUG OVERDOSE. COUGH TODAY  FINDINGS:  There are lower lung volumes with new bilateral perihilar and bibasilar airspace opacities.  No pneumothorax or large pleural effusion is seen.  Cardiac silhouette appears mildly enlarged, but is exaggerated by portable AP technique.        Low lung volumes with new bilateral perihilar and bibasilar airspace opacities, which may be due to pulmonary edema and/or atelectasis/pneumonia.       MELISSA GARCIA MD       Electronically Signed and Approved By: MELISSA GARCIA MD on 9/01/2022 at 16:05               Procedures:  Procedures    Progress  ED Course as of 09/01/22 2220   Thu Sep 01, 2022   1531 EKG:    Rhythm: Sinus rhythm  Rate: 66  Intervals: Normal NM and QT interval  T-wave: Isolated nonspecific T wave inversion III  ST Segment: J-point elevation III, II, aVF, no pathological ST elevation or ST depression    EKG Comparison: The QRS and ST morphology unchanged from EKG performed December 9, 2021    Interpreted by me   [SD]      ED Course User Index  [SD] Tito Arango DO                            Medical Decision Making:  MDM  Number of Diagnoses or Management Options  Methamphetamine abuse (HCC)  Recurrent seizures (HCC)  Diagnosis management comments:     The patient had reported seizure by family.  According to EMS he has been postictal all the way here.  The patient has been vomiting.  The patient was in the emergency room for nearly 6 hours and had no further seizures.  The patient did return to his normal baseline mental status.  When the patient did return to his normal baseline mental status he did admit to a recent use of methamphetamines.  The patient states that he uses methamphetamines on a regular basis.  The patient's  evaluation in the ER demonstrated glucose of 115 and ammonia of 32.  The patient's chemistry essentially was normal.  The patient's alcohol was 0.  The patient's EKG demonstrated normal sinus rhythm.  The patient had a normal magnesium.  The patient's white blood cell count was elevated at 22,000 segs were up at 19,000 this was thought in part to be due to seizures.  The patient's urine toxicology was positive for methamphetamines which he admits to the use.  The patient's also had positive marijuana use.  The patient's chest x-ray did not demonstrate any evidence of aspiration pneumonitis.  The patient did have low lung volumes with bibasilar airspace opacity which could been due to pulmonary edema, atelectasis or pneumonia.  Due to the poor lung volumes and the patient clinically not appearing to have pneumonia thought it was more likely to be atelectasis.  The patient had a head CT which was normal.  Again, the patient only had 1 seizure and returned back to his baseline mental status although did have some persistent nausea.  At this point because it appears that the methamphetamines have triggered the seizures I do not feel changing his seizure medicine is warranted.  I had a long conversation with the patient in regards to his methamphetamine use.  The patient verbalized understanding stopping his methamphetamine use.  The patient will be referred to his neurologist tomorrow to discuss his seizure medicine.  The patient was given very specific instructions on when and why to return to the emergency room.  The patient voiced understanding felt comfortable's instructions and comfortable for discharge.  Patient appears appropriate for discharge and outpatient follow-up.       Amount and/or Complexity of Data Reviewed  Clinical lab tests: reviewed  Tests in the radiology section of CPT®: reviewed  Tests in the medicine section of CPT®: reviewed                 Final diagnoses:   Recurrent seizures (HCC)    Methamphetamine abuse (HCC)        Disposition:  ED Disposition     ED Disposition   Discharge    Condition   Stable    Comment   --             Documentation assistance provided by Edwige Justice acting as scribe for Tito Arango DO. Information recorded by the scribe was done at my direction and has been verified and validated by me.          Edwige Justice  09/01/22 1538       Edwige Justice  09/01/22 1719       Tito Arango DO  09/07/22 2379

## 2022-09-02 ENCOUNTER — APPOINTMENT (OUTPATIENT)
Dept: MRI IMAGING | Facility: HOSPITAL | Age: 27
End: 2022-09-02

## 2022-09-02 ENCOUNTER — APPOINTMENT (OUTPATIENT)
Dept: NEUROLOGY | Facility: HOSPITAL | Age: 27
End: 2022-09-02

## 2022-09-02 VITALS
SYSTOLIC BLOOD PRESSURE: 121 MMHG | DIASTOLIC BLOOD PRESSURE: 52 MMHG | TEMPERATURE: 98.2 F | RESPIRATION RATE: 16 BRPM | BODY MASS INDEX: 31.47 KG/M2 | HEART RATE: 80 BPM | WEIGHT: 232.37 LBS | HEIGHT: 72 IN | OXYGEN SATURATION: 98 %

## 2022-09-02 PROBLEM — F44.5 PSYCHOGENIC NONEPILEPTIC SEIZURE: Status: ACTIVE | Noted: 2022-09-02

## 2022-09-02 LAB
ANION GAP SERPL CALCULATED.3IONS-SCNC: 9.5 MMOL/L (ref 5–15)
BASOPHILS # BLD AUTO: 0.03 10*3/MM3 (ref 0–0.2)
BASOPHILS NFR BLD AUTO: 0.2 % (ref 0–1.5)
BUN SERPL-MCNC: 8 MG/DL (ref 6–20)
BUN/CREAT SERPL: 9.9 (ref 7–25)
CALCIUM SPEC-SCNC: 9.2 MG/DL (ref 8.6–10.5)
CHLORIDE SERPL-SCNC: 101 MMOL/L (ref 98–107)
CO2 SERPL-SCNC: 25.5 MMOL/L (ref 22–29)
CREAT SERPL-MCNC: 0.81 MG/DL (ref 0.76–1.27)
DEPRECATED RDW RBC AUTO: 37.5 FL (ref 37–54)
EGFRCR SERPLBLD CKD-EPI 2021: 125.5 ML/MIN/1.73
EOSINOPHIL # BLD AUTO: 0.01 10*3/MM3 (ref 0–0.4)
EOSINOPHIL NFR BLD AUTO: 0.1 % (ref 0.3–6.2)
ERYTHROCYTE [DISTWIDTH] IN BLOOD BY AUTOMATED COUNT: 12.2 % (ref 12.3–15.4)
GLUCOSE SERPL-MCNC: 125 MG/DL (ref 65–99)
HCT VFR BLD AUTO: 45.1 % (ref 37.5–51)
HGB BLD-MCNC: 15.8 G/DL (ref 13–17.7)
IMM GRANULOCYTES # BLD AUTO: 0.06 10*3/MM3 (ref 0–0.05)
IMM GRANULOCYTES NFR BLD AUTO: 0.4 % (ref 0–0.5)
LYMPHOCYTES # BLD AUTO: 1.72 10*3/MM3 (ref 0.7–3.1)
LYMPHOCYTES NFR BLD AUTO: 10.3 % (ref 19.6–45.3)
MCH RBC QN AUTO: 29.7 PG (ref 26.6–33)
MCHC RBC AUTO-ENTMCNC: 35 G/DL (ref 31.5–35.7)
MCV RBC AUTO: 84.8 FL (ref 79–97)
MONOCYTES # BLD AUTO: 1.56 10*3/MM3 (ref 0.1–0.9)
MONOCYTES NFR BLD AUTO: 9.4 % (ref 5–12)
NEUTROPHILS NFR BLD AUTO: 13.3 10*3/MM3 (ref 1.7–7)
NEUTROPHILS NFR BLD AUTO: 79.6 % (ref 42.7–76)
NRBC BLD AUTO-RTO: 0 /100 WBC (ref 0–0.2)
PLATELET # BLD AUTO: 287 10*3/MM3 (ref 140–450)
PMV BLD AUTO: 9.6 FL (ref 6–12)
POTASSIUM SERPL-SCNC: 4.1 MMOL/L (ref 3.5–5.2)
QT INTERVAL: 353 MS
QT INTERVAL: 363 MS
RBC # BLD AUTO: 5.32 10*6/MM3 (ref 4.14–5.8)
SODIUM SERPL-SCNC: 136 MMOL/L (ref 136–145)
WBC NRBC COR # BLD: 16.68 10*3/MM3 (ref 3.4–10.8)

## 2022-09-02 PROCEDURE — G0378 HOSPITAL OBSERVATION PER HR: HCPCS

## 2022-09-02 PROCEDURE — 80048 BASIC METABOLIC PNL TOTAL CA: CPT | Performed by: HOSPITALIST

## 2022-09-02 PROCEDURE — 70553 MRI BRAIN STEM W/O & W/DYE: CPT

## 2022-09-02 PROCEDURE — 99284 EMERGENCY DEPT VISIT MOD MDM: CPT

## 2022-09-02 PROCEDURE — 25010000002 ENOXAPARIN PER 10 MG: Performed by: HOSPITALIST

## 2022-09-02 PROCEDURE — 0 GADOBENATE DIMEGLUMINE 529 MG/ML SOLUTION: Performed by: STUDENT IN AN ORGANIZED HEALTH CARE EDUCATION/TRAINING PROGRAM

## 2022-09-02 PROCEDURE — 25010000002 LORAZEPAM PER 2 MG: Performed by: EMERGENCY MEDICINE

## 2022-09-02 PROCEDURE — A9577 INJ MULTIHANCE: HCPCS | Performed by: STUDENT IN AN ORGANIZED HEALTH CARE EDUCATION/TRAINING PROGRAM

## 2022-09-02 PROCEDURE — 94799 UNLISTED PULMONARY SVC/PX: CPT

## 2022-09-02 PROCEDURE — 93010 ELECTROCARDIOGRAM REPORT: CPT | Performed by: INTERNAL MEDICINE

## 2022-09-02 PROCEDURE — 96376 TX/PRO/DX INJ SAME DRUG ADON: CPT

## 2022-09-02 PROCEDURE — 99217 PR OBSERVATION CARE DISCHARGE MANAGEMENT: CPT | Performed by: STUDENT IN AN ORGANIZED HEALTH CARE EDUCATION/TRAINING PROGRAM

## 2022-09-02 PROCEDURE — 95816 EEG AWAKE AND DROWSY: CPT | Performed by: PSYCHIATRY & NEUROLOGY

## 2022-09-02 PROCEDURE — 95816 EEG AWAKE AND DROWSY: CPT

## 2022-09-02 PROCEDURE — 85025 COMPLETE CBC W/AUTO DIFF WBC: CPT | Performed by: HOSPITALIST

## 2022-09-02 PROCEDURE — 99204 OFFICE O/P NEW MOD 45 MIN: CPT | Performed by: PSYCHIATRY & NEUROLOGY

## 2022-09-02 RX ORDER — ENOXAPARIN SODIUM 100 MG/ML
40 INJECTION SUBCUTANEOUS DAILY
Status: DISCONTINUED | OUTPATIENT
Start: 2022-09-02 | End: 2022-09-02 | Stop reason: HOSPADM

## 2022-09-02 RX ORDER — ACETAMINOPHEN 325 MG/1
650 TABLET ORAL EVERY 4 HOURS PRN
Status: DISCONTINUED | OUTPATIENT
Start: 2022-09-02 | End: 2022-09-02 | Stop reason: HOSPADM

## 2022-09-02 RX ORDER — LEVETIRACETAM 750 MG/1
1500 TABLET ORAL 2 TIMES DAILY
Status: DISCONTINUED | OUTPATIENT
Start: 2022-09-02 | End: 2022-09-02 | Stop reason: HOSPADM

## 2022-09-02 RX ORDER — DIVALPROEX SODIUM 500 MG/1
1000 TABLET, EXTENDED RELEASE ORAL DAILY
Status: DISCONTINUED | OUTPATIENT
Start: 2022-09-02 | End: 2022-09-02 | Stop reason: HOSPADM

## 2022-09-02 RX ORDER — POLYETHYLENE GLYCOL 3350 17 G/17G
17 POWDER, FOR SOLUTION ORAL DAILY PRN
Status: DISCONTINUED | OUTPATIENT
Start: 2022-09-02 | End: 2022-09-02 | Stop reason: HOSPADM

## 2022-09-02 RX ORDER — DIVALPROEX SODIUM 500 MG/1
1000 TABLET, EXTENDED RELEASE ORAL DAILY
Qty: 60 TABLET | Refills: 0 | Status: SHIPPED | OUTPATIENT
Start: 2022-09-03 | End: 2022-10-03

## 2022-09-02 RX ORDER — BISACODYL 10 MG
10 SUPPOSITORY, RECTAL RECTAL DAILY PRN
Status: DISCONTINUED | OUTPATIENT
Start: 2022-09-02 | End: 2022-09-02 | Stop reason: HOSPADM

## 2022-09-02 RX ORDER — BISACODYL 5 MG/1
5 TABLET, DELAYED RELEASE ORAL DAILY PRN
Status: DISCONTINUED | OUTPATIENT
Start: 2022-09-02 | End: 2022-09-02 | Stop reason: HOSPADM

## 2022-09-02 RX ORDER — SODIUM CHLORIDE 0.9 % (FLUSH) 0.9 %
10 SYRINGE (ML) INJECTION AS NEEDED
Status: DISCONTINUED | OUTPATIENT
Start: 2022-09-02 | End: 2022-09-02 | Stop reason: HOSPADM

## 2022-09-02 RX ORDER — AMOXICILLIN 250 MG
2 CAPSULE ORAL 2 TIMES DAILY
Status: DISCONTINUED | OUTPATIENT
Start: 2022-09-02 | End: 2022-09-02 | Stop reason: HOSPADM

## 2022-09-02 RX ORDER — SODIUM CHLORIDE 9 MG/ML
40 INJECTION, SOLUTION INTRAVENOUS AS NEEDED
Status: DISCONTINUED | OUTPATIENT
Start: 2022-09-02 | End: 2022-09-02 | Stop reason: HOSPADM

## 2022-09-02 RX ORDER — SODIUM CHLORIDE 0.9 % (FLUSH) 0.9 %
10 SYRINGE (ML) INJECTION EVERY 12 HOURS SCHEDULED
Status: DISCONTINUED | OUTPATIENT
Start: 2022-09-02 | End: 2022-09-02 | Stop reason: HOSPADM

## 2022-09-02 RX ORDER — LEVETIRACETAM 750 MG/1
1500 TABLET ORAL 2 TIMES DAILY
Qty: 120 TABLET | Refills: 0 | Status: SHIPPED | OUTPATIENT
Start: 2022-09-02 | End: 2022-10-02

## 2022-09-02 RX ORDER — OXCARBAZEPINE 300 MG/1
600 TABLET, FILM COATED ORAL 2 TIMES DAILY
Status: DISCONTINUED | OUTPATIENT
Start: 2022-09-02 | End: 2022-09-02 | Stop reason: HOSPADM

## 2022-09-02 RX ORDER — LEVETIRACETAM 750 MG/1
750 TABLET ORAL 2 TIMES DAILY
Status: DISCONTINUED | OUTPATIENT
Start: 2022-09-02 | End: 2022-09-02

## 2022-09-02 RX ORDER — ONDANSETRON 4 MG/1
4 TABLET, FILM COATED ORAL EVERY 6 HOURS PRN
Status: DISCONTINUED | OUTPATIENT
Start: 2022-09-02 | End: 2022-09-02 | Stop reason: HOSPADM

## 2022-09-02 RX ORDER — ONDANSETRON 2 MG/ML
4 INJECTION INTRAMUSCULAR; INTRAVENOUS EVERY 6 HOURS PRN
Status: DISCONTINUED | OUTPATIENT
Start: 2022-09-02 | End: 2022-09-02 | Stop reason: HOSPADM

## 2022-09-02 RX ORDER — PROPRANOLOL HYDROCHLORIDE 40 MG/1
40 TABLET ORAL 2 TIMES DAILY
Status: DISCONTINUED | OUTPATIENT
Start: 2022-09-02 | End: 2022-09-02 | Stop reason: HOSPADM

## 2022-09-02 RX ORDER — LORAZEPAM 2 MG/ML
1 INJECTION INTRAMUSCULAR ONCE
Status: COMPLETED | OUTPATIENT
Start: 2022-09-02 | End: 2022-09-02

## 2022-09-02 RX ORDER — CHOLECALCIFEROL (VITAMIN D3) 125 MCG
5 CAPSULE ORAL NIGHTLY PRN
Status: DISCONTINUED | OUTPATIENT
Start: 2022-09-02 | End: 2022-09-02 | Stop reason: HOSPADM

## 2022-09-02 RX ADMIN — OXCARBAZEPINE 600 MG: 300 TABLET, FILM COATED ORAL at 11:57

## 2022-09-02 RX ADMIN — Medication 10 ML: at 03:21

## 2022-09-02 RX ADMIN — PROPRANOLOL HYDROCHLORIDE 40 MG: 40 TABLET ORAL at 20:04

## 2022-09-02 RX ADMIN — ACETAMINOPHEN 325MG 650 MG: 325 TABLET ORAL at 03:19

## 2022-09-02 RX ADMIN — OXCARBAZEPINE 600 MG: 300 TABLET, FILM COATED ORAL at 20:03

## 2022-09-02 RX ADMIN — LORAZEPAM 1 MG: 2 INJECTION INTRAMUSCULAR at 01:02

## 2022-09-02 RX ADMIN — GADOBENATE DIMEGLUMINE 20 ML: 529 INJECTION, SOLUTION INTRAVENOUS at 11:13

## 2022-09-02 RX ADMIN — DIVALPROEX SODIUM 1000 MG: 500 TABLET, EXTENDED RELEASE ORAL at 11:57

## 2022-09-02 RX ADMIN — PROPRANOLOL HYDROCHLORIDE 40 MG: 40 TABLET ORAL at 11:57

## 2022-09-02 RX ADMIN — LEVETIRACETAM 1500 MG: 750 TABLET, FILM COATED ORAL at 20:04

## 2022-09-02 NOTE — PLAN OF CARE
Problem: Adult Inpatient Plan of Care  Goal: Plan of Care Review  9/2/2022 1905 by Joyce Wood RN  Outcome: Adequate for Care Transition  9/2/2022 1729 by Joyce Wood RN  Outcome: Ongoing, Progressing  Flowsheets (Taken 9/2/2022 1729)  Outcome Evaluation: No witness seizure activity. Neurology saw and medications given per their recommendation. No compalints of pain. Patient lethargic, MD aware. Withdrawn  Goal: Patient-Specific Goal (Individualized)  9/2/2022 1905 by Joyce Wood RN  Outcome: Adequate for Care Transition  9/2/2022 1729 by Joyce Wood RN  Outcome: Ongoing, Progressing  Goal: Absence of Hospital-Acquired Illness or Injury  9/2/2022 1905 by Joyce Wood RN  Outcome: Adequate for Care Transition  9/2/2022 1729 by Joyce Wood RN  Outcome: Ongoing, Progressing  Intervention: Identify and Manage Fall Risk  Recent Flowsheet Documentation  Taken 9/2/2022 1800 by Joyce Wood RN  Safety Promotion/Fall Prevention: safety round/check completed  Taken 9/2/2022 1613 by Joyce Wood RN  Safety Promotion/Fall Prevention: safety round/check completed  Taken 9/2/2022 1523 by Joyce Wood RN  Safety Promotion/Fall Prevention: safety round/check completed  Taken 9/2/2022 1400 by Joyce Wood RN  Safety Promotion/Fall Prevention: safety round/check completed  Taken 9/2/2022 1203 by Joyce Wood RN  Safety Promotion/Fall Prevention: safety round/check completed  Taken 9/2/2022 1041 by Joyce Wood RN  Safety Promotion/Fall Prevention:   room organization consistent   safety round/check completed  Goal: Optimal Comfort and Wellbeing  9/2/2022 1905 by Joyce Wood RN  Outcome: Adequate for Care Transition  9/2/2022 1729 by Joyce Wood RN  Outcome: Ongoing, Progressing  Goal: Readiness for Transition of Care  9/2/2022 1905 by Joyce Wood RN  Outcome: Adequate for Care Transition  9/2/2022 1729 by Joyce Wood RN  Outcome: Ongoing,  Progressing     Problem: Seizure, Active Management  Goal: Absence of Seizure/Seizure-Related Injury  9/2/2022 1905 by Joyce Wood RN  Outcome: Adequate for Care Transition  9/2/2022 1729 by Joyce Wood RN  Outcome: Ongoing, Progressing     Problem: Fall Injury Risk  Goal: Absence of Fall and Fall-Related Injury  9/2/2022 1905 by Joyce Wood RN  Outcome: Adequate for Care Transition  9/2/2022 1729 by Joyce Wood RN  Outcome: Ongoing, Progressing  Intervention: Promote Injury-Free Environment  Recent Flowsheet Documentation  Taken 9/2/2022 1800 by Joyce Wood RN  Safety Promotion/Fall Prevention: safety round/check completed  Taken 9/2/2022 1613 by Joyce Wood RN  Safety Promotion/Fall Prevention: safety round/check completed  Taken 9/2/2022 1523 by Joyce Wood RN  Safety Promotion/Fall Prevention: safety round/check completed  Taken 9/2/2022 1400 by Joyce Wood RN  Safety Promotion/Fall Prevention: safety round/check completed  Taken 9/2/2022 1203 by Joyce Wood RN  Safety Promotion/Fall Prevention: safety round/check completed  Taken 9/2/2022 1041 by Joyce Wood RN  Safety Promotion/Fall Prevention:   room organization consistent   safety round/check completed     Problem: Seizure Disorder Comorbidity  Goal: Maintenance of Seizure Control  9/2/2022 1905 by Joyce Wood RN  Outcome: Adequate for Care Transition  9/2/2022 1729 by Joyce Wood RN  Outcome: Ongoing, Progressing     Problem: Skin Injury Risk Increased  Goal: Skin Health and Integrity  9/2/2022 1905 by Joyce Wood RN  Outcome: Adequate for Care Transition  9/2/2022 1729 by Joyce Wood RN  Outcome: Ongoing, Progressing   Goal Outcome Evaluation:              Outcome Evaluation: No witness seizure activity. Neurology saw and medications given per their recommendation. No compalints of pain. Patient lethargic, MD aware. Withdrawn

## 2022-09-02 NOTE — H&P
[facility]  Date: 2022   Patient Name: Fred Wood  : 1995  MRN: [id number]  Primary Care Physician:  Provider, No Known  Date of admission: 2022    Subjective   Subjective     Chief Complaint: Seizure-like activity at home    HPI:    Fred Wood is a 26 y.o. male with medical history significant for seizure; presents with seizures.  Patient is difficult to arouse and does not give any history.  History obtained from EMR.  Patient initially presented to the ED secondary to having seizures at home.  Patient had used meth at home and was noted to have a seizure.  Patient was observed for 4 hours in the ED with no seizure-like activity and was to be discharged from the ER.  While patient was waiting for his ride in the lobby, patient was witnessed to have a tonic-clonic seizure.  Patient initially was not loaded with Keppra.  When patient had his seizure-like activity he was given Ativan and then loaded with Keppra IV.  Patient was noted to be in a postictal state in the ED.  It is unknown whether patient has been taking his seizure medication as prescribed.  Patient had reported to the ER physician that he had been having headaches, shortness of breath and vomiting.  Patient's family had reported to EMS that patient was using meth.      Personal History     Past Medical History:  Past Medical History:   Diagnosis Date   • Seizures (HCC)         Past Surgical History:  History reviewed. No pertinent surgical history.     Family History:   History reviewed. No pertinent family history.     Social History:   Social History     Socioeconomic History   • Marital status:    Substance and Sexual Activity   • Drug use: Yes     Types: Methamphetamines        Home Medications:  Prior to Admission medications    Medication Sig Start Date End Date Taking? Authorizing Provider   diclofenac (VOLTAREN) 50 MG EC tablet Take 50 mg by mouth 2 (Two) Times a Day As  Needed.    ProviderPaulino MD   levETIRAcetam (KEPPRA) 750 MG tablet Take 750 mg by mouth 2 (Two) Times a Day.    ProviderPaulino MD   OXcarbazepine (TRILEPTAL) 600 MG tablet Take 600 mg by mouth 2 (Two) Times a Day.    ProviderPaulino MD   propranolol (INDERAL) 40 MG tablet Take 40 mg by mouth 2 (Two) Times a Day.    ProviderPaulino MD        Allergies:  No Known Allergies    Review of Systems  Review of Systems   Reason unable to perform ROS: Unable to obtain a review of systems as patient is difficult to arouse and does not answer any questions.           Objective   Objective     Vitals:   Temp:  [98.4 °F (36.9 °C)-98.7 °F (37.1 °C)] 98.4 °F (36.9 °C)  Heart Rate:  [58-82] 58  Resp:  [18-21] 20  BP: (125-160)/(70-85) 160/82    Physical Exam   Physical Exam  Constitutional:       General: He is not in acute distress.     Appearance: He is not ill-appearing.      Comments: Patient asleep and difficult to arouse.   HENT:      Head: Normocephalic and atraumatic.      Right Ear: External ear normal.      Left Ear: External ear normal.      Nose: Nose normal.      Mouth/Throat:      Pharynx: Oropharynx is clear.   Eyes:      Pupils: Pupils are equal, round, and reactive to light.   Cardiovascular:      Rate and Rhythm: Normal rate and regular rhythm.      Pulses: Normal pulses.      Heart sounds: Normal heart sounds. No murmur heard.    No friction rub. No gallop.   Pulmonary:      Effort: Pulmonary effort is normal. No respiratory distress.      Breath sounds: Normal breath sounds. No wheezing, rhonchi or rales.   Abdominal:      General: Bowel sounds are normal. There is no distension.      Tenderness: There is no abdominal tenderness.   Musculoskeletal:         General: No swelling. Normal range of motion.      Cervical back: Neck supple.   Skin:     General: Skin is warm.      Capillary Refill: Capillary refill takes less than 2 seconds.          Result Review    Result Review:  Glucose    Date Value Ref Range Status   09/01/2022 141 (H) 65 - 99 mg/dL Final     BUN   Date Value Ref Range Status   09/01/2022 12 6 - 20 mg/dL Final     Creatinine   Date Value Ref Range Status   09/01/2022 0.85 0.76 - 1.27 mg/dL Final     Sodium   Date Value Ref Range Status   09/01/2022 135 (L) 136 - 145 mmol/L Final     Potassium   Date Value Ref Range Status   09/01/2022 5.2 3.5 - 5.2 mmol/L Final     Chloride   Date Value Ref Range Status   09/01/2022 101 98 - 107 mmol/L Final     CO2   Date Value Ref Range Status   09/01/2022 22.2 22.0 - 29.0 mmol/L Final     Calcium   Date Value Ref Range Status   09/01/2022 9.4 8.6 - 10.5 mg/dL Final     Total Protein   Date Value Ref Range Status   09/01/2022 7.2 6.0 - 8.5 g/dL Final     Albumin   Date Value Ref Range Status   09/01/2022 4.70 3.50 - 5.20 g/dL Final     ALT (SGPT)   Date Value Ref Range Status   09/01/2022 27 1 - 41 U/L Final     AST (SGOT)   Date Value Ref Range Status   09/01/2022 22 1 - 40 U/L Final     Alkaline Phosphatase   Date Value Ref Range Status   09/01/2022 97 39 - 117 U/L Final     Total Bilirubin   Date Value Ref Range Status   09/01/2022 0.2 0.0 - 1.2 mg/dL Final     BUN/Creatinine Ratio   Date Value Ref Range Status   09/01/2022 14.1 7.0 - 25.0 Final     Anion Gap   Date Value Ref Range Status   09/01/2022 11.8 5.0 - 15.0 mmol/L Final      WBC   Date Value Ref Range Status   09/01/2022 22.38 (H) 3.40 - 10.80 10*3/mm3 Final     RBC   Date Value Ref Range Status   09/01/2022 5.38 4.14 - 5.80 10*6/mm3 Final     Hemoglobin   Date Value Ref Range Status   09/01/2022 16.0 13.0 - 17.7 g/dL Final     Hematocrit   Date Value Ref Range Status   09/01/2022 45.9 37.5 - 51.0 % Final     MCV   Date Value Ref Range Status   09/01/2022 85.3 79.0 - 97.0 fL Final     MCH   Date Value Ref Range Status   09/01/2022 29.7 26.6 - 33.0 pg Final     MCHC   Date Value Ref Range Status   09/01/2022 34.9 31.5 - 35.7 g/dL Final     RDW   Date Value Ref Range Status    09/01/2022 12.2 (L) 12.3 - 15.4 % Final     RDW-SD   Date Value Ref Range Status   09/01/2022 38.1 37.0 - 54.0 fl Final     MPV   Date Value Ref Range Status   09/01/2022 9.4 6.0 - 12.0 fL Final     Platelets   Date Value Ref Range Status   09/01/2022 307 140 - 450 10*3/mm3 Final     Neutrophil %   Date Value Ref Range Status   09/01/2022 86.0 (H) 42.7 - 76.0 % Final     Lymphocyte %   Date Value Ref Range Status   09/01/2022 4.2 (L) 19.6 - 45.3 % Final     Monocyte %   Date Value Ref Range Status   09/01/2022 9.0 5.0 - 12.0 % Final     Eosinophil %   Date Value Ref Range Status   09/01/2022 0.1 (L) 0.3 - 6.2 % Final     Basophil %   Date Value Ref Range Status   09/01/2022 0.3 0.0 - 1.5 % Final     Immature Grans %   Date Value Ref Range Status   09/01/2022 0.4 0.0 - 0.5 % Final     Neutrophils, Absolute   Date Value Ref Range Status   09/01/2022 19.25 (H) 1.70 - 7.00 10*3/mm3 Final     Lymphocytes, Absolute   Date Value Ref Range Status   09/01/2022 0.95 0.70 - 3.10 10*3/mm3 Final     Monocytes, Absolute   Date Value Ref Range Status   09/01/2022 2.01 (H) 0.10 - 0.90 10*3/mm3 Final     Eosinophils, Absolute   Date Value Ref Range Status   09/01/2022 0.02 0.00 - 0.40 10*3/mm3 Final     Basophils, Absolute   Date Value Ref Range Status   09/01/2022 0.06 0.00 - 0.20 10*3/mm3 Final     Immature Grans, Absolute   Date Value Ref Range Status   09/01/2022 0.09 (H) 0.00 - 0.05 10*3/mm3 Final     nRBC   Date Value Ref Range Status   09/01/2022 0.0 0.0 - 0.2 /100 WBC Final      UA    Urinalysis 12/9/21 12/9/21    0621 0621   Squamous Epithelial Cells, UA  0-2   Specific Gravity, UA 1.019    Ketones, UA Negative    Blood, UA Negative    Leukocytes, UA Negative    Nitrite, UA Negative    RBC, UA  None Seen   WBC, UA  None Seen   Bacteria, UA  None Seen              Imaging:  CT Head Without Contrast    Result Date: 9/1/2022  PROCEDURE: CT HEAD WO CONTRAST  COMPARISON:  [facility], CT, CT HEAD WO CONTRAST,  12/09/2021, 5:34. INDICATIONS: headache  PROTOCOL:   Standard imaging protocol performed    RADIATION:   DLP: 954.6mGy*cm   MA and/or KV was adjusted to minimize radiation dose.     TECHNIQUE: Axial images of the head without intravenous contrast.  FINDINGS:  The ventricles have a normal size and configuration. There is no evidence of acute intracranial hemorrhage, mass or midline shift. No extra-axial fluid collections are identified. There are no skull fractures. The visualized paranasal sinuses and mastoid air cells are grossly clear.  IMPRESSION: Normal exam.  OUMOU DARDEN MD       Electronically Signed and Approved By: OUMOU DARDEN MD on 9/01/2022 at 16:48             XR Chest 1 View    Result Date: 9/1/2022  PROCEDURE: XR CHEST 1 VW  COMPARISON: [facility], , CHEST PA/AP & LAT 2V, 6/14/2006, 21:08.  INDICATIONS: DRUG OVERDOSE. COUGH TODAY  FINDINGS:  There are lower lung volumes with new bilateral perihilar and bibasilar airspace opacities.  No pneumothorax or large pleural effusion is seen.  Cardiac silhouette appears mildly enlarged, but is exaggerated by portable AP technique.        Low lung volumes with new bilateral perihilar and bibasilar airspace opacities, which may be due to pulmonary edema and/or atelectasis/pneumonia.       MELISSA GARCIA MD       Electronically Signed and Approved By: MELISSA GARCIA MD on 9/01/2022 at 16:05                  Assessment & Plan   Assessment / Plan     Active Hospital Problems    Diagnosis  POA   • **Breakthrough seizure (HCC) [G40.919]  Yes     Priority: High   • Substance abuse (HCC) [F19.10]  Yes     Priority: High        Assessment/Plan:   1. Seizure disorder-patient with history of seizure disorder.  Patient noted to have a seizure prior to coming to the hospital.  Patient also known to use meth prior to coming to the hospital.  It is unknown if patient is using his medication as prescribed.  Patient noted to have a seizure while  waiting for his ride when he was discharged from the ER.  Patient later given Ativan for his seizure and loaded with Keppra.  Patient will be continued on his home seizure medication with Trileptal and Keppra.  Neurology has been consulted.  We will place patient in seizure precaution.  2. Substance abuse- patient's family states that patient has been using methamphetamines.  Patient currently does not wake up to verbal or tactile stimulus and goes back to sleep.  Patient will need to be counseled on methamphetamine cessation.      DVT prophylaxis:  Medical DVT prophylaxis orders are present.    CODE STATUS:     Unable to obtain a CODE STATUS from patient as patient is difficult to arouse.  Patient does not answer any of my questions or open eyes long enough to give history.    Admission Status:  I believe this patient meets observation status.    Electronically signed by Dania Luna MD, 09/01/22, 11:14 PM EDT.

## 2022-09-02 NOTE — CONSULTS
[facility]   Neurology Consult Note    Patient Name: Fred Wood  : 1995  MRN: [id number]  Primary Care Physician:  Provider, No Known  Referring Physician: No ref. provider found  Date of admission: 2022    Subjective   Subjective     Reason for Consult/ Chief Complaint: Seizures    HPI:  Fred Wood is a 26 y.o. male evaluated for seizures.  Patient states that he has been compliant with his medications but had a seizure.  The patient was observed for hours in the emergency room and had another seizure in the waiting room and therefore was admitted.  He states that he does not know physician who prescribes to him antiepileptic medications.  He is presently taking levetiracetam 750 mg 2 times a day and oxcarbazepine 6 mg twice a day.  Patient has had other emergency room visits for the same plans and was discharged after being stabilized.  CT scan of brain shows a normal examination without contrast.    Tox screen shows positive amphetamines and THC.    Addendum: This is now evening and the patient is awake but is still somnolent.  He tells me that he is having the spells several times a week.  He was told by his wife that this is hyperventilation syndrome.  He states that he has the spells where he can feel like he is going to pass out.    EEG shows slowing of the background activity just above mild encephalopathy.  No epileptiform discharges was noted.    MRI of the brain negative.      Personal History     Past Medical History:   Diagnosis Date   • Seizures (HCC)        History reviewed. No pertinent surgical history.    Family History: family history is not on file. Otherwise pertinent FHx was reviewed and not pertinent to current issue.    Social History:  reports that he has been smoking cigarettes. He has been smoking about 1.00 pack per day. He does not have any smokeless tobacco history on file. He reports current drug use. Drug: Methamphetamines.    Home  Medications:  OXcarbazepine, diclofenac, levETIRAcetam, and propranolol    Allergies:  No Known Allergies    Objective    Objective     Vitals:   Temp:  [98.4 °F (36.9 °C)-98.6 °F (37 °C)] 98.6 °F (37 °C)  Heart Rate:  [58-85] 62  Resp:  [16-21] 17  BP: (102-160)/(51-82) 112/51    Physical Exam: He is sleepy but answers questions appropriately.  He is lying in bed and the phlebotomist was trying to obtain labs.  Was able to answer questions appropriately.  There is no focal weakness.  There is a questionable tongue bite.    Repeat examination he is alert, follows commands well.  EOMs full directions gaze, facial strength is full.  There is no tongue bite noted on repeat examination.  There is no weakness of the upper or lower extremities.  Reflexes symmetrically decreased.  He is able to sit up without difficulty.  Heart is regular rhythm normal in rate          Result Review    Result Review:  I have personally reviewed the results from the time of this admission to 9/2/2022 16:48 EDT and agree with these findings:  [x]  Laboratory  []  Microbiology  []  Radiology  []  EKG/Telemetry   []  Cardiology/Vascular   []  Pathology  [x]  Old records  []  Other:      Assessment & Plan   Assessment / Plan   Active Hospital Problems:  Active Hospital Problems    Diagnosis    • **Breakthrough seizure (HCC)    • Psychogenic nonepileptic seizure    • Substance abuse (HCC)          Plan: I will start him on Depakote ER 1000 mg daily and he is to continue taking oxcarbazepine 600 mg twice a day.  I will increase his Keppra to 1500 mg twice a day.  He can follow-up with his neurologist or primary care and eventually he could be taken off of oxcarbazepine.  I will order an MRI and EEG.    Addendum: History is obtained from the patient which raises the likely probability of psychogenic nonepileptic seizures.  I discussed with him that I added Depakote to his regimen.  He has taken in the past.  He can follow-up in our office to see   BISI Smith.    He is not to drive.  He can bring his wife on the next clinic visit and the patient can be worked up for echogenic nonepileptic seizures.  This is most likely secondary to hyperventilation syndrome.    He is to continue taking levetiracetam, oxcarbazepine and Depakote until his next clinic visit.            Total time spent with the patient and coordinating patient care was 50 minutes.    electronically signed by Vinay Marquis MD, 09/02/22, 8:31 AM EDT.

## 2022-09-02 NOTE — PROGRESS NOTES
[facility]         HOSPITALIST  DISCHARGE SUMMARY    Patient Name: Fred Wood  : 1995  MRN: [id number]    Date of Admission: 2022  Date of Discharge: 2022  Primary Care Physician: Provider, No Known    Consults     Date and Time Order Name Status Description    2022  9:59 PM Inpatient Neurology Consult General Completed     2022  9:59 PM Hospitalist (on-call MD unless specified)            Active and Resolved Hospital Problems:  Active Hospital Problems    Diagnosis POA   • **Breakthrough seizure (HCC) [G40.919] Yes   • Psychogenic nonepileptic seizure [F44.5] Unknown   • Substance abuse (HCC) [F19.10] Yes      Resolved Hospital Problems   No resolved problems to display.       Hospital Course     HPI Per hospital record: Fred Wood is a 26 y.o. male with medical history significant for seizure; presents with seizures.  Patient is difficult to arouse and does not give any history.  History obtained from EMR.  Patient initially presented to the ED secondary to having seizures at home.  Patient had used meth at home and was noted to have a seizure.  Patient was observed for 4 hours in the ED with no seizure-like activity and was to be discharged from the ER.  While patient was waiting for his ride in the lobby, patient was witnessed to have a tonic-clonic seizure.  Patient initially was not loaded with Keppra.  When patient had his seizure-like activity he was given Ativan and then loaded with Keppra IV.  Patient was noted to be in a postictal state in the ED.  It is unknown whether patient has been taking his seizure medication as prescribed.  Patient had reported to the ER physician that he had been having headaches, shortness of breath and vomiting.  Patient's family had reported to EMS that patient was using meth.    Patient ultimately admitted and was evaluated by neurology.  She had an EEG that showed slowing of background activity just above mild  encephalopathy.  There were no evidence of any epileptiform discharges noted.  MRI of the brain was negative.  Patient started on Depakote extended release 1000 mg daily and was told to continue taking oxcarbazepine 600 mg twice daily.  Patient's Keppra was also increased to 50 mg twice daily.  Patient will follow-up with neurology in the office further recommendations.  He will follow-up with his PCP in 1 week.  He was discharged in stable condition.      Discharge problem list:  Seizures, suspect psychogenic nonepileptic seizure  Substance abuse    DISCHARGE Follow Up Recommendations for labs and diagnostics: PCP 1 week.  Neurology as scheduled.      Day of Discharge     Vital Signs:  Temp:  [98.4 °F (36.9 °C)-98.6 °F (37 °C)] 98.6 °F (37 °C)  Heart Rate:  [58-85] 62  Resp:  [16-21] 17  BP: (102-160)/(51-82) 112/51  Physical Exam:   Constitutional: Alert, awake, no acute distress  HEENT:  PERRLA, EOMI; No Scleral icterus  Neck:  Supple; No Mass, No Lymphadenopathy  Cardiovascular:  No Rubs, No Edema, No JVD  Respiratory: No respiratory distress, unlabored breathing, saturating well on room air  Abdomen:  Normal BS all 4 Quadrants; No Guarding, No Tenderness  Musculoskeletal:  Pulses Positive all 4 Ext; No Cyanosis, No Edema  Neurological:  Alert+Ox3, Mental status WNL; No Dysarthria  Skin:  No Rash, No Cellulitis, No Erythema, tattoos      Discharge Details        Discharge Medications      New Medications      Instructions Start Date   divalproex 500 MG 24 hr tablet  Commonly known as: DEPAKOTE ER   1,000 mg, Oral, Daily   Start Date: September 3, 2022        Changes to Medications      Instructions Start Date   levETIRAcetam 750 MG tablet  Commonly known as: KEPPRA  What changed: how much to take   1,500 mg, Oral, 2 Times Daily         Continue These Medications      Instructions Start Date   diclofenac 50 MG EC tablet  Commonly known as: VOLTAREN   50 mg, Oral, 2 Times Daily PRN      OXcarbazepine 600 MG  tablet  Commonly known as: TRILEPTAL   600 mg, Oral, 2 Times Daily      propranolol 40 MG tablet  Commonly known as: INDERAL   40 mg, Oral, 2 Times Daily             No Known Allergies    Discharge Disposition:  Home or Self Care    Diet:  Hospital:  Diet Order   Procedures   • Diet Regular       Discharge Activity:       CODE STATUS:  There are no questions and answers to display.         No future appointments.    [unfilled]    Pertinent  and/or Most Recent Results     PROCEDURES:   EEG    Result Date: 9/2/2022  Narrative: Clinical history: 26-year-old man with history of seizure. EEG description: This is a portable 90 channel EEG recording using the 10/20 for nausea classification of electrode placement. EEG report: The underlying background activity consist of significant amount of muscle artifacts and the patient was unable to relax.  5 to 6 Hz theta activity amplitude of 20 µV is noted centrally amplitude of 30 µV.  There is intermittent 2 to 3 Hz delta activity amplitude of 30 to 40 µV in the frontal regions.  There were no epileptiform discharges that was recorded.  There is no focal slowing.  Intermittent beta activity is noted in the frontal regions.  Photic stimulation did not activate any abnormalities. Impression: This EEG which was recorded during wakefulness is abnormal secondary to slowing of the background activity.  This finding is suggestive of a mild encephalopathy of nonspecific etiology.    CT Head Without Contrast    Result Date: 9/1/2022  Narrative: PROCEDURE: CT HEAD WO CONTRAST  COMPARISON:  [facility], CT, CT HEAD WO CONTRAST, 12/09/2021, 5:34. INDICATIONS: headache  PROTOCOL:   Standard imaging protocol performed    RADIATION:   DLP: 954.6mGy*cm   MA and/or KV was adjusted to minimize radiation dose.     TECHNIQUE: Axial images of the head without intravenous contrast.  FINDINGS:  The ventricles have a normal size and configuration. There is no evidence of acute  intracranial hemorrhage, mass or midline shift. No extra-axial fluid collections are identified. There are no skull fractures. The visualized paranasal sinuses and mastoid air cells are grossly clear.  IMPRESSION: Normal exam.  OUMOU DARDEN MD       Electronically Signed and Approved By: OUMOU DARDEN MD on 9/01/2022 at 16:48             MRI Brain With & Without Contrast    Result Date: 9/2/2022  Narrative: PROCEDURE: MRI BRAIN W WO CONTRAST  COMPARISON: None  INDICATIONS: seizure  CONTRAST: 20ml  Multihance I.V.  TECHNIQUE: A variety of imaging planes and parameters were utilized for visualization of suspected pathology.  Images were performed without and with gadolinium contrast.  FINDINGS:  There is no diffusion restriction to suggest acute infarct. There is no evidence of acute intracranial hemorrhage. There are no signal abnormalities within the brain parenchyma. The vascular flow voids appear intact. There is no mass effect, midline shift, herniation or hydrocephalus. The midline structures appear intact. Cortex appears intact. The basal ganglia, brainstem and cerebellum appear within normal limits. Calvarial and superficial soft tissue signal is within normal limits. The paranasal sinuses and the mastoid air cells appear well aerated. Orbits appear unremarkable.  There is no evidence of gray matter heterotopia.  There is no evidence of cortical dysplasia.  Medial temporal lobes appear normal.  Hippocampus is normal bilaterally.        Impression:  No acute intracranial abnormality.  No epileptogenic focus identified.      YULIYA ROMAN MD       Electronically Signed and Approved By: YULIYA ROMAN MD on 9/02/2022 at 11:51             XR Chest 1 View    Result Date: 9/1/2022  Narrative: PROCEDURE: XR CHEST 1 VW  COMPARISON: [facility], CR, CHEST PA/AP & LAT 2V, 6/14/2006, 21:08.  INDICATIONS: DRUG OVERDOSE. COUGH TODAY  FINDINGS:  There are lower lung volumes with new bilateral perihilar  and bibasilar airspace opacities.  No pneumothorax or large pleural effusion is seen.  Cardiac silhouette appears mildly enlarged, but is exaggerated by portable AP technique.      Impression:   Low lung volumes with new bilateral perihilar and bibasilar airspace opacities, which may be due to pulmonary edema and/or atelectasis/pneumonia.       MELISSA GARCIA MD       Electronically Signed and Approved By: MELISSA GARCIA MD on 9/01/2022 at 16:05                 LAB RESULTS:      Lab 09/02/22  0748 09/01/22  1513   WBC 16.68* 22.38*   HEMOGLOBIN 15.8 16.0   HEMATOCRIT 45.1 45.9   PLATELETS 287 307   NEUTROS ABS 13.30* 19.25*   IMMATURE GRANS (ABS) 0.06* 0.09*   LYMPHS ABS 1.72 0.95   MONOS ABS 1.56* 2.01*   EOS ABS 0.01 0.02   MCV 84.8 85.3         Lab 09/02/22  0748 09/01/22  1513   SODIUM 136 135*   POTASSIUM 4.1 5.2   CHLORIDE 101 101   CO2 25.5 22.2   ANION GAP 9.5 11.8   BUN 8 12   CREATININE 0.81 0.85   EGFR 125.5 123.7   GLUCOSE 125* 141*   CALCIUM 9.2 9.4   MAGNESIUM  --  2.3         Lab 09/01/22  1513   TOTAL PROTEIN 7.2   ALBUMIN 4.70   GLOBULIN 2.5   ALT (SGPT) 27   AST (SGOT) 22   BILIRUBIN 0.2   ALK PHOS 97         Lab 09/01/22  1513   TROPONIN T <0.010                 Brief Urine Lab Results  (Last result in the past 365 days)      Color   Clarity   Blood   Leuk Est   Nitrite   Protein   CREAT   Urine HCG        12/09/21 0621 Yellow   Clear   Negative   Negative   Negative   30 mg/dL (1+)               Microbiology Results (last 10 days)     ** No results found for the last 240 hours. **          MRI Brain With & Without Contrast    Result Date: 9/2/2022  Impression:  No acute intracranial abnormality.  No epileptogenic focus identified.      YULIYA ROMAN MD       Electronically Signed and Approved By: YULIYA ROMAN MD on 9/02/2022 at 11:51             XR Chest 1 View    Result Date: 9/1/2022  Impression:   Low lung volumes with new bilateral perihilar and bibasilar airspace opacities, which may  be due to pulmonary edema and/or atelectasis/pneumonia.       MELISSA GARCIA MD       Electronically Signed and Approved By: MELISSA GARCIA MD on 9/01/2022 at 16:05                           Labs Pending at Discharge:        Time spent on Discharge including face to face service:  32 minutes    Electronically signed by Bernard Martinez DO, 09/02/22, 5:56 PM EDT.

## 2022-09-02 NOTE — DISCHARGE SUMMARY
[facility]         HOSPITALIST  DISCHARGE SUMMARY    Patient Name: Fred Wood  : 1995  MRN: [id number]    Date of Admission: 2022  Date of Discharge: 2022  Primary Care Physician: Provider, No Known    Consults     Date and Time Order Name Status Description    2022  9:59 PM Inpatient Neurology Consult General Completed     2022  9:59 PM Hospitalist (on-call MD unless specified)            Active and Resolved Hospital Problems:  Active Hospital Problems    Diagnosis POA   • **Breakthrough seizure (HCC) [G40.919] Yes   • Psychogenic nonepileptic seizure [F44.5] Unknown   • Substance abuse (HCC) [F19.10] Yes      Resolved Hospital Problems   No resolved problems to display.       Hospital Course     HPI Per hospital record: Fred Wood is a 26 y.o. male with medical history significant for seizure; presents with seizures.  Patient is difficult to arouse and does not give any history.  History obtained from EMR.  Patient initially presented to the ED secondary to having seizures at home.  Patient had used meth at home and was noted to have a seizure.  Patient was observed for 4 hours in the ED with no seizure-like activity and was to be discharged from the ER.  While patient was waiting for his ride in the lobby, patient was witnessed to have a tonic-clonic seizure.  Patient initially was not loaded with Keppra.  When patient had his seizure-like activity he was given Ativan and then loaded with Keppra IV.  Patient was noted to be in a postictal state in the ED.  It is unknown whether patient has been taking his seizure medication as prescribed.  Patient had reported to the ER physician that he had been having headaches, shortness of breath and vomiting.  Patient's family had reported to EMS that patient was using meth.    Patient ultimately admitted and was evaluated by neurology.  She had an EEG that showed slowing of background activity just above mild encephalopathy.  There  were no evidence of any epileptiform discharges noted.  MRI of the brain was negative.  Patient started on Depakote extended release 1000 mg daily and was told to continue taking oxcarbazepine 600 mg twice daily.  Patient's Keppra was also increased to 50 mg twice daily.  Patient will follow-up with neurology in the office further recommendations.  He will follow-up with his PCP in 1 week.  He was discharged in stable condition.      Discharge problem list:  Seizures, suspect psychogenic nonepileptic seizure  Substance abuse    DISCHARGE Follow Up Recommendations for labs and diagnostics: PCP 1 week.  Neurology as scheduled.      Day of Discharge     Vital Signs:  Temp:  [98.4 °F (36.9 °C)-98.6 °F (37 °C)] 98.6 °F (37 °C)  Heart Rate:  [58-85] 62  Resp:  [16-21] 17  BP: (102-160)/(51-82) 112/51  Physical Exam:   Constitutional: Alert, awake, no acute distress  HEENT:  PERRLA, EOMI; No Scleral icterus  Neck:  Supple; No Mass, No Lymphadenopathy  Cardiovascular:  No Rubs, No Edema, No JVD  Respiratory: No respiratory distress, unlabored breathing, saturating well on room air  Abdomen:  Normal BS all 4 Quadrants; No Guarding, No Tenderness  Musculoskeletal:  Pulses Positive all 4 Ext; No Cyanosis, No Edema  Neurological:  Alert+Ox3, Mental status WNL; No Dysarthria  Skin:  No Rash, No Cellulitis, No Erythema, tattoos      Discharge Details        Discharge Medications      New Medications      Instructions Start Date   divalproex 500 MG 24 hr tablet  Commonly known as: DEPAKOTE ER   1,000 mg, Oral, Daily   Start Date: September 3, 2022        Changes to Medications      Instructions Start Date   levETIRAcetam 750 MG tablet  Commonly known as: KEPPRA  What changed: how much to take   1,500 mg, Oral, 2 Times Daily         Continue These Medications      Instructions Start Date   diclofenac 50 MG EC tablet  Commonly known as: VOLTAREN   50 mg, Oral, 2 Times Daily PRN      OXcarbazepine 600 MG tablet  Commonly known as:  TRILEPTAL   600 mg, Oral, 2 Times Daily      propranolol 40 MG tablet  Commonly known as: INDERAL   40 mg, Oral, 2 Times Daily             No Known Allergies    Discharge Disposition:  Home or Self Care    Diet:  Hospital:  Diet Order   Procedures   • Diet Regular       Discharge Activity:       CODE STATUS:  There are no questions and answers to display.         No future appointments.    [unfilled]    Pertinent  and/or Most Recent Results     PROCEDURES:   EEG    Result Date: 9/2/2022  Narrative: Clinical history: 26-year-old man with history of seizure. EEG description: This is a portable 90 channel EEG recording using the 10/20 for nausea classification of electrode placement. EEG report: The underlying background activity consist of significant amount of muscle artifacts and the patient was unable to relax.  5 to 6 Hz theta activity amplitude of 20 µV is noted centrally amplitude of 30 µV.  There is intermittent 2 to 3 Hz delta activity amplitude of 30 to 40 µV in the frontal regions.  There were no epileptiform discharges that was recorded.  There is no focal slowing.  Intermittent beta activity is noted in the frontal regions.  Photic stimulation did not activate any abnormalities. Impression: This EEG which was recorded during wakefulness is abnormal secondary to slowing of the background activity.  This finding is suggestive of a mild encephalopathy of nonspecific etiology.    CT Head Without Contrast    Result Date: 9/1/2022  Narrative: PROCEDURE: CT HEAD WO CONTRAST  COMPARISON:  [facility], CT, CT HEAD WO CONTRAST, 12/09/2021, 5:34. INDICATIONS: headache  PROTOCOL:   Standard imaging protocol performed    RADIATION:   DLP: 954.6mGy*cm   MA and/or KV was adjusted to minimize radiation dose.     TECHNIQUE: Axial images of the head without intravenous contrast.  FINDINGS:  The ventricles have a normal size and configuration. There is no evidence of acute intracranial hemorrhage, mass or  midline shift. No extra-axial fluid collections are identified. There are no skull fractures. The visualized paranasal sinuses and mastoid air cells are grossly clear.  IMPRESSION: Normal exam.  OUMOU DARDEN MD       Electronically Signed and Approved By: OUMOU DARDEN MD on 9/01/2022 at 16:48             MRI Brain With & Without Contrast    Result Date: 9/2/2022  Narrative: PROCEDURE: MRI BRAIN W WO CONTRAST  COMPARISON: None  INDICATIONS: seizure  CONTRAST: 20ml  Multihance I.V.  TECHNIQUE: A variety of imaging planes and parameters were utilized for visualization of suspected pathology.  Images were performed without and with gadolinium contrast.  FINDINGS:  There is no diffusion restriction to suggest acute infarct. There is no evidence of acute intracranial hemorrhage. There are no signal abnormalities within the brain parenchyma. The vascular flow voids appear intact. There is no mass effect, midline shift, herniation or hydrocephalus. The midline structures appear intact. Cortex appears intact. The basal ganglia, brainstem and cerebellum appear within normal limits. Calvarial and superficial soft tissue signal is within normal limits. The paranasal sinuses and the mastoid air cells appear well aerated. Orbits appear unremarkable.  There is no evidence of gray matter heterotopia.  There is no evidence of cortical dysplasia.  Medial temporal lobes appear normal.  Hippocampus is normal bilaterally.        Impression:  No acute intracranial abnormality.  No epileptogenic focus identified.      YULIYA ROMAN MD       Electronically Signed and Approved By: YULIYA ROMAN MD on 9/02/2022 at 11:51             XR Chest 1 View    Result Date: 9/1/2022  Narrative: PROCEDURE: XR CHEST 1 VW  COMPARISON: [facility], CR, CHEST PA/AP & LAT 2V, 6/14/2006, 21:08.  INDICATIONS: DRUG OVERDOSE. COUGH TODAY  FINDINGS:  There are lower lung volumes with new bilateral perihilar and bibasilar airspace opacities.   No pneumothorax or large pleural effusion is seen.  Cardiac silhouette appears mildly enlarged, but is exaggerated by portable AP technique.      Impression:   Low lung volumes with new bilateral perihilar and bibasilar airspace opacities, which may be due to pulmonary edema and/or atelectasis/pneumonia.       MELISSA GARCIA MD       Electronically Signed and Approved By: MELISSA GARCIA MD on 9/01/2022 at 16:05                 LAB RESULTS:      Lab 09/02/22  0748 09/01/22  1513   WBC 16.68* 22.38*   HEMOGLOBIN 15.8 16.0   HEMATOCRIT 45.1 45.9   PLATELETS 287 307   NEUTROS ABS 13.30* 19.25*   IMMATURE GRANS (ABS) 0.06* 0.09*   LYMPHS ABS 1.72 0.95   MONOS ABS 1.56* 2.01*   EOS ABS 0.01 0.02   MCV 84.8 85.3         Lab 09/02/22  0748 09/01/22  1513   SODIUM 136 135*   POTASSIUM 4.1 5.2   CHLORIDE 101 101   CO2 25.5 22.2   ANION GAP 9.5 11.8   BUN 8 12   CREATININE 0.81 0.85   EGFR 125.5 123.7   GLUCOSE 125* 141*   CALCIUM 9.2 9.4   MAGNESIUM  --  2.3         Lab 09/01/22  1513   TOTAL PROTEIN 7.2   ALBUMIN 4.70   GLOBULIN 2.5   ALT (SGPT) 27   AST (SGOT) 22   BILIRUBIN 0.2   ALK PHOS 97         Lab 09/01/22  1513   TROPONIN T <0.010                 Brief Urine Lab Results  (Last result in the past 365 days)      Color   Clarity   Blood   Leuk Est   Nitrite   Protein   CREAT   Urine HCG        12/09/21 0621 Yellow   Clear   Negative   Negative   Negative   30 mg/dL (1+)               Microbiology Results (last 10 days)     ** No results found for the last 240 hours. **          MRI Brain With & Without Contrast    Result Date: 9/2/2022  Impression:  No acute intracranial abnormality.  No epileptogenic focus identified.      YULIYA ROMAN MD       Electronically Signed and Approved By: YULIYA ROMAN MD on 9/02/2022 at 11:51             XR Chest 1 View    Result Date: 9/1/2022  Impression:   Low lung volumes with new bilateral perihilar and bibasilar airspace opacities, which may be due to pulmonary edema and/or  atelectasis/pneumonia.       MELISSA GARCIA MD       Electronically Signed and Approved By: MELISSA GARCIA MD on 9/01/2022 at 16:05                           Labs Pending at Discharge:        Time spent on Discharge including face to face service:  32 minutes    Electronically signed by Bernard Martinez DO, 09/02/22, 5:56 PM EDT.

## 2022-09-02 NOTE — PLAN OF CARE
Goal Outcome Evaluation:  Plan of Care Reviewed With: patient           Outcome Evaluation: Patient admitted to floor at 01:10am. Patient did not have any seizure activity throughout shift. Patient remains sleepy and medicated x1 for headache with relief noted. No further changes noted.

## 2022-09-02 NOTE — PLAN OF CARE
Problem: Adult Inpatient Plan of Care  Goal: Plan of Care Review  Outcome: Ongoing, Progressing  Flowsheets (Taken 9/2/2022 1729)  Outcome Evaluation: No witness seizure activity. Neurology saw and medications given per their recommendation. No compalints of pain. Patient MD larry aware. Withdrawn  Goal: Patient-Specific Goal (Individualized)  Outcome: Ongoing, Progressing  Goal: Absence of Hospital-Acquired Illness or Injury  Outcome: Ongoing, Progressing  Intervention: Identify and Manage Fall Risk  Recent Flowsheet Documentation  Taken 9/2/2022 1400 by Joyce Wood RN  Safety Promotion/Fall Prevention: safety round/check completed  Goal: Optimal Comfort and Wellbeing  Outcome: Ongoing, Progressing  Goal: Readiness for Transition of Care  Outcome: Ongoing, Progressing     Problem: Seizure, Active Management  Goal: Absence of Seizure/Seizure-Related Injury  Outcome: Ongoing, Progressing     Problem: Fall Injury Risk  Goal: Absence of Fall and Fall-Related Injury  Outcome: Ongoing, Progressing  Intervention: Promote Injury-Free Environment  Recent Flowsheet Documentation  Taken 9/2/2022 1400 by Joyce Wood RN  Safety Promotion/Fall Prevention: safety round/check completed     Problem: Seizure Disorder Comorbidity  Goal: Maintenance of Seizure Control  Outcome: Ongoing, Progressing     Problem: Skin Injury Risk Increased  Goal: Skin Health and Integrity  Outcome: Ongoing, Progressing   Goal Outcome Evaluation:              Outcome Evaluation: No witness seizure activity. Neurology saw and medications given per their recommendation. No compalints of pain. Patient lethMD toya aware. Withdrawn

## 2022-09-02 NOTE — ED PROVIDER NOTES
"Time: 9:56 PM EDT    Chief Complaint: seizures    History of Present Illness:  Patient is a 26 y.o. year old male that presents to the emergency department with seizures. Pt used meth at home and had a seizure. Pt was observed for 4 hours in the ED with no seizure activity. While waiting in the lobby for a ride home, pt had a witnessed seizure.      History provided by: Nursing staff, documentation from previous visit.  History limited by:  Mental status change   used: No        Similar Symptoms Previously: yes  Recently seen: yes, earlier today      Patient Care Team  Primary Care Provider: Liliam Cardoza Known    Past Medical History:     No Known Allergies  Past Medical History:   Diagnosis Date   • Seizures (HCC)      History reviewed. No pertinent surgical history.  History reviewed. No pertinent family history.    Home Medications:  Prior to Admission medications    Medication Sig Start Date End Date Taking? Authorizing Provider   levETIRAcetam (KEPPRA) 750 MG tablet Take 750 mg by mouth 2 (Two) Times a Day.    ProviderPaulino MD   OXcarbazepine (TRILEPTAL) 300 MG tablet Take 600 mg by mouth 2 (Two) Times a Day.    Provider, MD Paulino        Social History:   Social History     Tobacco Use   • Smoking status: Current Every Day Smoker     Packs/day: 1.00     Types: Cigarettes   Substance Use Topics   • Drug use: Yes     Types: Methamphetamines       Record Review:  I have reviewed the patient's records in [employer].     Review of Systems:  Review of Systems   Unable to perform ROS: Mental status change        Physical Exam:  /73 (BP Location: Right arm, Patient Position: Sitting)   Pulse 85   Temp 98.5 °F (36.9 °C) (Axillary)   Resp 20   Ht 182.9 cm (72.01\")   Wt 105 kg (232 lb 5.8 oz)   SpO2 100%   BMI 31.51 kg/m²     Physical Exam  Vitals and nursing note reviewed.   Constitutional:       General: He is not in acute distress.     Appearance: Normal appearance. He is not " toxic-appearing.   HENT:      Head: Normocephalic and atraumatic.      Jaw: There is normal jaw occlusion.      Mouth/Throat:      Comments: Small amount of blood at lips    Eyes:      General: Lids are normal.      Extraocular Movements: Extraocular movements intact.      Conjunctiva/sclera: Conjunctivae normal.      Pupils: Pupils are equal, round, and reactive to light.   Cardiovascular:      Rate and Rhythm: Normal rate and regular rhythm.      Pulses: Normal pulses.      Heart sounds: Normal heart sounds.   Pulmonary:      Effort: Pulmonary effort is normal. No respiratory distress.      Breath sounds: Normal breath sounds. No wheezing or rhonchi.      Comments: Protecting his airway  Abdominal:      General: Abdomen is flat.      Palpations: Abdomen is soft.      Tenderness: There is no abdominal tenderness. There is no guarding or rebound.   Musculoskeletal:         General: Normal range of motion.      Cervical back: Normal range of motion and neck supple.      Right lower leg: No edema.      Left lower leg: No edema.      Comments: Moving all extremities   Skin:     General: Skin is warm and dry.   Neurological:      Mental Status: He is alert and oriented to person, place, and time. Mental status is at baseline.      GCS: GCS eye subscore is 3. GCS verbal subscore is 4. GCS motor subscore is 5.      Cranial Nerves: No cranial nerve deficit.      Motor: No weakness.      Comments: Post ictal   Psychiatric:         Mood and Affect: Mood normal.                Medications in the Emergency Department:  Medications   levETIRAcetam (KEPPRA) tablet 750 mg (has no administration in time range)   OXcarbazepine (TRILEPTAL) tablet 600 mg (has no administration in time range)   propranolol (INDERAL) tablet 40 mg (has no administration in time range)   sodium chloride 0.9 % flush 10 mL (has no administration in time range)   sodium chloride 0.9 % flush 10 mL (10 mL Intravenous Given 9/2/22 6973)   sodium chloride 0.9 %  infusion 40 mL (has no administration in time range)   Enoxaparin Sodium (LOVENOX) syringe 40 mg (has no administration in time range)   acetaminophen (TYLENOL) tablet 650 mg (650 mg Oral Given 9/2/22 0319)   melatonin tablet 5 mg (has no administration in time range)   sennosides-docusate (PERICOLACE) 8.6-50 MG per tablet 2 tablet (has no administration in time range)     And   polyethylene glycol (MIRALAX) packet 17 g (has no administration in time range)     And   bisacodyl (DULCOLAX) EC tablet 5 mg (has no administration in time range)     And   bisacodyl (DULCOLAX) suppository 10 mg (has no administration in time range)   ondansetron (ZOFRAN) tablet 4 mg (has no administration in time range)     Or   ondansetron (ZOFRAN) injection 4 mg (has no administration in time range)   !Patient Home Medications Stored in Pharmacy (0 packages Does not apply Hold 9/2/22 0900)   LORazepam (ATIVAN) injection 1 mg (1 mg Intravenous Given 9/1/22 2152)   levETIRAcetam in NaCl 0.75% (KEPPRA) IVPB 1,000 mg (0 mg Intravenous Stopped 9/1/22 2218)   LORazepam (ATIVAN) injection 1 mg (1 mg Intravenous Given 9/2/22 0102)        Labs  Lab Results (last 24 hours)     Procedure Component Value Units Date/Time    CBC & Differential [[id number]]  (Abnormal) Collected: 09/01/22 1513    Specimen: Blood Updated: 09/01/22 1521    Narrative:      The following orders were created for panel order CBC & Differential.  Procedure                               Abnormality         Status                     ---------                               -----------         ------                     CBC Auto Differential[[id number]]        Abnormal            Final result                 Please view results for these tests on the individual orders.    Comprehensive Metabolic Panel [[id number]]  (Abnormal) Collected: 09/01/22 1513    Specimen: Blood Updated: 09/01/22 1539     Glucose 141 mg/dL      BUN 12 mg/dL      Creatinine 0.85 mg/dL      Sodium 135 mmol/L       Potassium 5.2 mmol/L      Chloride 101 mmol/L      CO2 22.2 mmol/L      Calcium 9.4 mg/dL      Total Protein 7.2 g/dL      Albumin 4.70 g/dL      ALT (SGPT) 27 U/L      AST (SGOT) 22 U/L      Alkaline Phosphatase 97 U/L      Total Bilirubin 0.2 mg/dL      Globulin 2.5 gm/dL      A/G Ratio 1.9 g/dL      BUN/Creatinine Ratio 14.1     Anion Gap 11.8 mmol/L      eGFR 123.7 mL/min/1.73      Comment: National Kidney Foundation and American Society of Nephrology (ASN) Task Force recommended calculation based on the Chronic Kidney Disease Epidemiology Collaboration (CKD-EPI) equation refit without adjustment for race.       Narrative:      GFR Normal >60  Chronic Kidney Disease <60  Kidney Failure <15      CBC Auto Differential [[id number]]  (Abnormal) Collected: 09/01/22 1513    Specimen: Blood Updated: 09/01/22 1521     WBC 22.38 10*3/mm3      RBC 5.38 10*6/mm3      Hemoglobin 16.0 g/dL      Hematocrit 45.9 %      MCV 85.3 fL      MCH 29.7 pg      MCHC 34.9 g/dL      RDW 12.2 %      RDW-SD 38.1 fl      MPV 9.4 fL      Platelets 307 10*3/mm3      Neutrophil % 86.0 %      Lymphocyte % 4.2 %      Monocyte % 9.0 %      Eosinophil % 0.1 %      Basophil % 0.3 %      Immature Grans % 0.4 %      Neutrophils, Absolute 19.25 10*3/mm3      Lymphocytes, Absolute 0.95 10*3/mm3      Monocytes, Absolute 2.01 10*3/mm3      Eosinophils, Absolute 0.02 10*3/mm3      Basophils, Absolute 0.06 10*3/mm3      Immature Grans, Absolute 0.09 10*3/mm3      nRBC 0.0 /100 WBC     Ammonia [[id number]]  (Normal) Collected: 09/01/22 1513    Specimen: Blood Updated: 09/01/22 1541     Ammonia 32 umol/L     Magnesium [[id number]]  (Normal) Collected: 09/01/22 1513    Specimen: Blood Updated: 09/01/22 1539     Magnesium 2.3 mg/dL     Ethanol [[id number]] Collected: 09/01/22 1513    Specimen: Blood Updated: 09/01/22 1539     Ethanol <10 mg/dL      Ethanol % <0.010 %     Narrative:      Ethanol (Plasma)  <10 Essentially Negative    Toxic Concentrations            mg/dL    Flushing, slowing of reflexes    Impaired visual activity         Depression of CNS              >100  Possible Coma                  >300       Troponin [[id number]]  (Normal) Collected: 09/01/22 1513    Specimen: Blood Updated: 09/01/22 1539     Troponin T <0.010 ng/mL     Narrative:      Troponin T Reference Range:  <= 0.03 ng/mL-   Negative for AMI  >0.03 ng/mL-     Abnormal for myocardial necrosis.  Clinicians would have to utilize clinical acumen, EKG, Troponin and serial changes to determine if it is an Acute Myocardial Infarction or myocardial injury due to an underlying chronic condition.       Results may be falsely decreased if patient taking Biotin.      Urine Drug Screen - Urine, Clean Catch [[id number]]  (Abnormal) Collected: 09/01/22 1603    Specimen: Urine, Clean Catch Updated: 09/01/22 1631     Amphet/Methamphet, Screen Positive     Barbiturates Screen, Urine Negative     Benzodiazepine Screen, Urine Negative     Cocaine Screen, Urine Negative     Opiate Screen Negative     THC, Screen, Urine Positive     Methadone Screen, Urine Negative     Oxycodone Screen, Urine Negative    Narrative:      Negative Thresholds Per Drugs Screened:    Amphetamines                 500 ng/ml  Barbiturates                 200 ng/ml  Benzodiazepines              100 ng/ml  Cocaine                      300 ng/ml  Methadone                    300 ng/ml  Opiates                      300 ng/ml  Oxycodone                    100 ng/ml  THC                           50 ng/ml    The Normal Value for all drugs tested is negative. This report includes final unconfirmed screening results to be used for medical treatment purposes only. Unconfirmed results must not be used for non-medical purposes such as employment or legal testing. Clinical consideration should be applied to any drug of abuse test, particularly when unconfirmed results are used.            POC Glucose Once [[id number]]  (Abnormal) Collected:  09/01/22 1951    Specimen: Blood Updated: 09/01/22 1952     Glucose 115 mg/dL      Comment: Serial Number: [id number]:  [id number]       POC Glucose Once [[id number]]  (Abnormal) Collected: 09/01/22 2150    Specimen: Blood Updated: 09/01/22 2154     Glucose 133 mg/dL      Comment: Serial Number: [id number]:  [id number]              Imaging:  CT Head Without Contrast    Result Date: 9/1/2022  PROCEDURE: CT HEAD WO CONTRAST  COMPARISON:  [facility], CT, CT HEAD WO CONTRAST, 12/09/2021, 5:34. INDICATIONS: headache  PROTOCOL:   Standard imaging protocol performed    RADIATION:   DLP: 954.6mGy*cm   MA and/or KV was adjusted to minimize radiation dose.     TECHNIQUE: Axial images of the head without intravenous contrast.  FINDINGS:  The ventricles have a normal size and configuration. There is no evidence of acute intracranial hemorrhage, mass or midline shift. No extra-axial fluid collections are identified. There are no skull fractures. The visualized paranasal sinuses and mastoid air cells are grossly clear.  IMPRESSION: Normal exam.  OUMOU DARDEN MD       Electronically Signed and Approved By: OUMOU DARDEN MD on 9/01/2022 at 16:48             XR Chest 1 View    Result Date: 9/1/2022  PROCEDURE: XR CHEST 1 VW  COMPARISON: [facility], CR, CHEST PA/AP & LAT 2V, 6/14/2006, 21:08.  INDICATIONS: DRUG OVERDOSE. COUGH TODAY  FINDINGS:  There are lower lung volumes with new bilateral perihilar and bibasilar airspace opacities.  No pneumothorax or large pleural effusion is seen.  Cardiac silhouette appears mildly enlarged, but is exaggerated by portable AP technique.        Low lung volumes with new bilateral perihilar and bibasilar airspace opacities, which may be due to pulmonary edema and/or atelectasis/pneumonia.       MELISSA GARCIA MD       Electronically Signed and Approved By: MELISSA GARCIA MD on 9/01/2022 at 16:05               Procedures:  ECG 12 Lead      Date/Time:  9/1/2022 9:56 PM  Performed by: Vinay Rendon MD  Authorized by: Vinay Rendon MD   Interpreted by physician  Comments: Sinus rhythm 73 bpm, normal P wave, normal QRS, normal ST segment, normal QT, artifact present, unchanged from previous ECG          Progress                            Medical Decision Making:  MDM  Number of Diagnoses or Management Options     Amount and/or Complexity of Data Reviewed  Clinical lab tests: reviewed and ordered  Tests in the radiology section of CPT®: ordered and reviewed  Obtain history from someone other than the patient: yes  Review and summarize past medical records: yes  Discuss the patient with other providers: yes    Patient treated with lorazepam and Keppra in the emergency department.  Patient discussed with on-call neurology.  Reviewed patient's recent visit from earlier in the day.  Patient discussed with hospitalist for admission.  The patient's airway is intact, vital signs, and respiratory status are safe for admission at this time.    Final diagnoses:   Breakthrough seizure (HCC)   Methamphetamine abuse (HCC)        Disposition:  ED Disposition     ED Disposition   Decision to Admit    Condition   --    Comment   Level of Care: Med/Surg [1]   Diagnosis: Breakthrough seizure (HCC) [203939]               Dictated Utilizing Dragon Dictation    Documentation assistance provided by Mateusz Julian acting as scribe for Vinay Rendon MD. Information recorded by the scribe was done at my direction and has been verified and validated by me.        Mateusz Julian  09/01/22 2203       Mateusz Julian  09/01/22 2207       Vinay Rendon MD  09/02/22 0792

## 2022-09-02 NOTE — NURSING NOTE
Patient arrived on floor at 01:10am, patient recently received ativan and unable to answer questions at this time.

## 2022-09-05 LAB — QT INTERVAL: 382 MS

## 2022-11-11 ENCOUNTER — TELEPHONE (OUTPATIENT)
Dept: NEUROLOGY | Facility: CLINIC | Age: 27
End: 2022-11-11

## 2022-11-11 NOTE — TELEPHONE ENCOUNTER
Caller: DONALD    Moe call back number: [phone] IF YOU GET V.M YOU CAN LEAVE DETAILED MESS.     New or established patient?  [x] New  [] Established    Date of discharge:09.02.22    Facility discharged from: [facility]     Diagnosis/Symptoms: SEIZURE    Length of stay (If applicable): 23 HOURS    Specialty Only: Did you see a [facility] health provider?    [x] Yes  [] No  If so, who? DR IVAN    DISCHARGE STATED TO F.U WITH HIS  NEUROLOGIST IN ONE WEEK    HIS NEUROLOGIST HAS RETIRED (HE FOUND OUT IN HOSPITAL )  HE  WOULD LIKE TO CONTINUE WITH DR IVAN SINCE HE SAW HIM IN HOSPITAL? PT STATES HE  WILL BE OUT OF RX KEPPRA IN 5 DAYS  CAN A REFILL BE SENT IN?    [facility] [address] [place] [address] [phone]  - [phone]   [phone]    PLEASE ADVISE.

## 2022-11-11 NOTE — TELEPHONE ENCOUNTER
Appointment scheduled on 12/20, unable to get any sooner. He will need to contact her previous neurologist or PCP to get medication refilled prior to that. Unable to leave  as it is not set up. Hub can verbalize to patient if he calls back.

## 2023-03-11 ENCOUNTER — APPOINTMENT (OUTPATIENT)
Dept: GENERAL RADIOLOGY | Facility: HOSPITAL | Age: 28
End: 2023-03-11
Payer: COMMERCIAL

## 2023-03-11 ENCOUNTER — HOSPITAL ENCOUNTER (EMERGENCY)
Facility: HOSPITAL | Age: 28
Discharge: LEFT BEFORE DISCHARGE | End: 2023-03-11
Attending: EMERGENCY MEDICINE | Admitting: EMERGENCY MEDICINE
Payer: COMMERCIAL

## 2023-03-11 ENCOUNTER — HOSPITAL ENCOUNTER (EMERGENCY)
Facility: HOSPITAL | Age: 28
Discharge: HOME | End: 2023-03-11
Attending: EMERGENCY MEDICINE | Admitting: EMERGENCY MEDICINE
Payer: COMMERCIAL

## 2023-03-11 VITALS
SYSTOLIC BLOOD PRESSURE: 104 MMHG | OXYGEN SATURATION: 91 % | WEIGHT: 244.71 LBS | RESPIRATION RATE: 26 BRPM | HEIGHT: 66 IN | TEMPERATURE: 95.5 F | BODY MASS INDEX: 39.33 KG/M2 | DIASTOLIC BLOOD PRESSURE: 46 MMHG | HEART RATE: 75 BPM

## 2023-03-11 VITALS
HEART RATE: 53 BPM | WEIGHT: 244.71 LBS | OXYGEN SATURATION: 97 % | RESPIRATION RATE: 18 BRPM | SYSTOLIC BLOOD PRESSURE: 131 MMHG | HEIGHT: 66 IN | DIASTOLIC BLOOD PRESSURE: 87 MMHG | BODY MASS INDEX: 39.33 KG/M2

## 2023-03-11 DIAGNOSIS — E87.20 LACTIC ACIDOSIS: ICD-10-CM

## 2023-03-11 DIAGNOSIS — E87.20 METABOLIC ACIDOSIS: ICD-10-CM

## 2023-03-11 DIAGNOSIS — R56.9 SEIZURE: Primary | ICD-10-CM

## 2023-03-11 DIAGNOSIS — G40.901 STATUS EPILEPTICUS: ICD-10-CM

## 2023-03-11 LAB
ALBUMIN SERPL-MCNC: 5 G/DL (ref 3.5–5.2)
ALBUMIN SERPL-MCNC: 5.2 G/DL (ref 3.5–5.2)
ALBUMIN/GLOB SERPL: 1.7 G/DL
ALBUMIN/GLOB SERPL: 2.1 G/DL
ALP SERPL-CCNC: 119 U/L (ref 39–117)
ALP SERPL-CCNC: 121 U/L (ref 39–117)
ALT SERPL W P-5'-P-CCNC: 43 U/L (ref 1–41)
ALT SERPL W P-5'-P-CCNC: 43 U/L (ref 1–41)
AMORPH URATE CRY URNS QL MICRO: ABNORMAL /HPF
AMPHET+METHAMPHET UR QL: NEGATIVE
AMPHET+METHAMPHET UR QL: NEGATIVE
ANION GAP SERPL CALCULATED.3IONS-SCNC: 24.9 MMOL/L (ref 5–15)
ANION GAP SERPL CALCULATED.3IONS-SCNC: 26.5 MMOL/L (ref 5–15)
ARTERIAL PATENCY WRIST A: POSITIVE
AST SERPL-CCNC: 29 U/L (ref 1–40)
AST SERPL-CCNC: 31 U/L (ref 1–40)
BACTERIA UR QL AUTO: ABNORMAL /HPF
BARBITURATES UR QL SCN: NEGATIVE
BARBITURATES UR QL SCN: NEGATIVE
BASE EXCESS BLDA CALC-SCNC: -21.7 MMOL/L (ref -2–2)
BASOPHILS # BLD AUTO: 0.08 10*3/MM3 (ref 0–0.2)
BASOPHILS # BLD AUTO: 0.12 10*3/MM3 (ref 0–0.2)
BASOPHILS NFR BLD AUTO: 0.4 % (ref 0–1.5)
BASOPHILS NFR BLD AUTO: 0.4 % (ref 0–1.5)
BDY SITE: ABNORMAL
BENZODIAZ UR QL SCN: NEGATIVE
BENZODIAZ UR QL SCN: NEGATIVE
BILIRUB SERPL-MCNC: 0.2 MG/DL (ref 0–1.2)
BILIRUB SERPL-MCNC: 0.2 MG/DL (ref 0–1.2)
BILIRUB UR QL STRIP: NEGATIVE
BUN SERPL-MCNC: 18 MG/DL (ref 6–20)
BUN SERPL-MCNC: 19 MG/DL (ref 6–20)
BUN/CREAT SERPL: 17.3 (ref 7–25)
BUN/CREAT SERPL: 18.3 (ref 7–25)
CA-I BLDA-SCNC: 1.2 MMOL/L (ref 1.13–1.32)
CALCIUM SPEC-SCNC: 9.6 MG/DL (ref 8.6–10.5)
CALCIUM SPEC-SCNC: 9.8 MG/DL (ref 8.6–10.5)
CANNABINOIDS SERPL QL: POSITIVE
CANNABINOIDS SERPL QL: POSITIVE
CHLORIDE BLDA-SCNC: 103 MMOL/L (ref 98–106)
CHLORIDE SERPL-SCNC: 97 MMOL/L (ref 98–107)
CHLORIDE SERPL-SCNC: 98 MMOL/L (ref 98–107)
CLARITY UR: ABNORMAL
CLUMPED PLATELETS: PRESENT
CO2 SERPL-SCNC: 10.5 MMOL/L (ref 22–29)
CO2 SERPL-SCNC: 11.1 MMOL/L (ref 22–29)
COCAINE UR QL: NEGATIVE
COCAINE UR QL: NEGATIVE
COHGB MFR BLD: 0.2 % (ref 0–1.5)
COLOR UR: YELLOW
CREAT SERPL-MCNC: 1.04 MG/DL (ref 0.76–1.27)
CREAT SERPL-MCNC: 1.04 MG/DL (ref 0.76–1.27)
D-LACTATE SERPL-SCNC: 13.3 MMOL/L (ref 0.5–2)
D-LACTATE SERPL-SCNC: 2.3 MMOL/L (ref 0.5–2)
DEPRECATED RDW RBC AUTO: 37.6 FL (ref 37–54)
DEPRECATED RDW RBC AUTO: 41.4 FL (ref 37–54)
EGFRCR SERPLBLD CKD-EPI 2021: 101.6 ML/MIN/1.73
EGFRCR SERPLBLD CKD-EPI 2021: 101.6 ML/MIN/1.73
EOSINOPHIL # BLD AUTO: 0.03 10*3/MM3 (ref 0–0.4)
EOSINOPHIL # BLD AUTO: 0.13 10*3/MM3 (ref 0–0.4)
EOSINOPHIL NFR BLD AUTO: 0.1 % (ref 0.3–6.2)
EOSINOPHIL NFR BLD AUTO: 0.6 % (ref 0.3–6.2)
ERYTHROCYTE [DISTWIDTH] IN BLOOD BY AUTOMATED COUNT: 12.4 % (ref 12.3–15.4)
ERYTHROCYTE [DISTWIDTH] IN BLOOD BY AUTOMATED COUNT: 12.6 % (ref 12.3–15.4)
ETHANOL BLD-MCNC: <10 MG/DL (ref 0–10)
ETHANOL UR QL: <0.01 %
FHHB: 3.6 % (ref 0–5)
GAS FLOW AIRWAY: 4 LPM
GLOBULIN UR ELPH-MCNC: 2.5 GM/DL
GLOBULIN UR ELPH-MCNC: 2.9 GM/DL
GLUCOSE BLDA-MCNC: 168 MG/DL (ref 70–99)
GLUCOSE BLDC GLUCOMTR-MCNC: 148 MG/DL (ref 70–99)
GLUCOSE BLDC GLUCOMTR-MCNC: 233 MG/DL (ref 70–99)
GLUCOSE SERPL-MCNC: 183 MG/DL (ref 65–99)
GLUCOSE SERPL-MCNC: 183 MG/DL (ref 65–99)
GLUCOSE UR STRIP-MCNC: ABNORMAL MG/DL
HCO3 BLDA-SCNC: 9.3 MMOL/L (ref 22–26)
HCT VFR BLD AUTO: 51.2 % (ref 37.5–51)
HCT VFR BLD AUTO: 56.7 % (ref 37.5–51)
HGB BLD-MCNC: 17.9 G/DL (ref 13–17.7)
HGB BLD-MCNC: 18 G/DL (ref 13–17.7)
HGB BLDA-MCNC: 18.2 G/DL (ref 13.8–16.4)
HGB UR QL STRIP.AUTO: ABNORMAL
HOLD SPECIMEN: NORMAL
HYALINE CASTS UR QL AUTO: ABNORMAL /LPF
IMM GRANULOCYTES # BLD AUTO: 0.13 10*3/MM3 (ref 0–0.05)
IMM GRANULOCYTES # BLD AUTO: 0.37 10*3/MM3 (ref 0–0.05)
IMM GRANULOCYTES NFR BLD AUTO: 0.6 % (ref 0–0.5)
IMM GRANULOCYTES NFR BLD AUTO: 1.1 % (ref 0–0.5)
INHALED O2 CONCENTRATION: 36 %
KETONES UR QL STRIP: ABNORMAL
LACTATE BLDA-SCNC: 12.12 MMOL/L (ref 0.5–2)
LARGE PLATELETS: NORMAL
LEUKOCYTE ESTERASE UR QL STRIP.AUTO: NEGATIVE
LYMPHOCYTES # BLD AUTO: 3.64 10*3/MM3 (ref 0.7–3.1)
LYMPHOCYTES # BLD AUTO: 4.25 10*3/MM3 (ref 0.7–3.1)
LYMPHOCYTES NFR BLD AUTO: 12.9 % (ref 19.6–45.3)
LYMPHOCYTES NFR BLD AUTO: 16.9 % (ref 19.6–45.3)
MAGNESIUM SERPL-MCNC: 2.8 MG/DL (ref 1.6–2.6)
MCH RBC QN AUTO: 28.8 PG (ref 26.6–33)
MCH RBC QN AUTO: 28.9 PG (ref 26.6–33)
MCHC RBC AUTO-ENTMCNC: 31.7 G/DL (ref 31.5–35.7)
MCHC RBC AUTO-ENTMCNC: 35 G/DL (ref 31.5–35.7)
MCV RBC AUTO: 82.7 FL (ref 79–97)
MCV RBC AUTO: 90.9 FL (ref 79–97)
METHADONE UR QL SCN: NEGATIVE
METHADONE UR QL SCN: NEGATIVE
METHGB BLD QL: 0.6 % (ref 0–1.5)
MODALITY: ABNORMAL
MONOCYTES # BLD AUTO: 1.68 10*3/MM3 (ref 0.1–0.9)
MONOCYTES # BLD AUTO: 3.41 10*3/MM3 (ref 0.1–0.9)
MONOCYTES NFR BLD AUTO: 10.4 % (ref 5–12)
MONOCYTES NFR BLD AUTO: 7.8 % (ref 5–12)
NEUTROPHILS NFR BLD AUTO: 15.9 10*3/MM3 (ref 1.7–7)
NEUTROPHILS NFR BLD AUTO: 24.7 10*3/MM3 (ref 1.7–7)
NEUTROPHILS NFR BLD AUTO: 73.7 % (ref 42.7–76)
NEUTROPHILS NFR BLD AUTO: 75.1 % (ref 42.7–76)
NITRITE UR QL STRIP: NEGATIVE
NRBC BLD AUTO-RTO: 0 /100 WBC (ref 0–0.2)
NRBC BLD AUTO-RTO: 0 /100 WBC (ref 0–0.2)
OPIATES UR QL: NEGATIVE
OPIATES UR QL: NEGATIVE
OXYCODONE UR QL SCN: NEGATIVE
OXYCODONE UR QL SCN: NEGATIVE
OXYHGB MFR BLDV: 95.6 % (ref 94–99)
PCO2 BLDA: 39 MM HG (ref 35–45)
PH BLDA: 7 PH UNITS (ref 7.35–7.45)
PH UR STRIP.AUTO: 5.5 [PH] (ref 5–8)
PLATELET # BLD AUTO: 379 10*3/MM3 (ref 140–450)
PLATELET # BLD AUTO: 405 10*3/MM3 (ref 140–450)
PMV BLD AUTO: 10 FL (ref 6–12)
PMV BLD AUTO: 9.7 FL (ref 6–12)
PO2 BLD: 353 MM[HG] (ref 0–500)
PO2 BLDA: 127 MM HG (ref 80–100)
POTASSIUM BLDA-SCNC: 4.39 MMOL/L (ref 3.5–5)
POTASSIUM SERPL-SCNC: 3.5 MMOL/L (ref 3.5–5.2)
POTASSIUM SERPL-SCNC: 4 MMOL/L (ref 3.5–5.2)
PROT SERPL-MCNC: 7.7 G/DL (ref 6–8.5)
PROT SERPL-MCNC: 7.9 G/DL (ref 6–8.5)
PROT UR QL STRIP: ABNORMAL
RBC # BLD AUTO: 6.19 10*6/MM3 (ref 4.14–5.8)
RBC # BLD AUTO: 6.24 10*6/MM3 (ref 4.14–5.8)
RBC # UR STRIP: ABNORMAL /HPF
RBC MORPH BLD: NORMAL
REF LAB TEST METHOD: ABNORMAL
SAO2 % BLDCOA: 96.4 % (ref 95–99)
SMALL PLATELETS BLD QL SMEAR: ADEQUATE
SODIUM BLDA-SCNC: 137.6 MMOL/L (ref 136–146)
SODIUM SERPL-SCNC: 134 MMOL/L (ref 136–145)
SODIUM SERPL-SCNC: 134 MMOL/L (ref 136–145)
SP GR UR STRIP: >=1.03 (ref 1–1.03)
SQUAMOUS #/AREA URNS HPF: ABNORMAL /HPF
URATE CRY URNS QL MICRO: ABNORMAL /HPF
UROBILINOGEN UR QL STRIP: ABNORMAL
VALPROATE SERPL-MCNC: <2.8 MCG/ML (ref 50–125)
VALPROATE SERPL-MCNC: <2.8 MCG/ML (ref 50–125)
WBC # UR STRIP: ABNORMAL /HPF
WBC MORPH BLD: NORMAL
WBC NRBC COR # BLD: 21.56 10*3/MM3 (ref 3.4–10.8)
WBC NRBC COR # BLD: 32.88 10*3/MM3 (ref 3.4–10.8)
WHOLE BLOOD HOLD COAG: NORMAL
WHOLE BLOOD HOLD COAG: NORMAL
WHOLE BLOOD HOLD SPECIMEN: NORMAL
WHOLE BLOOD HOLD SPECIMEN: NORMAL

## 2023-03-11 PROCEDURE — 99284 EMERGENCY DEPT VISIT MOD MDM: CPT

## 2023-03-11 PROCEDURE — 96374 THER/PROPH/DIAG INJ IV PUSH: CPT

## 2023-03-11 PROCEDURE — 96361 HYDRATE IV INFUSION ADD-ON: CPT

## 2023-03-11 PROCEDURE — 80307 DRUG TEST PRSMV CHEM ANLYZR: CPT | Performed by: EMERGENCY MEDICINE

## 2023-03-11 PROCEDURE — 82375 ASSAY CARBOXYHB QUANT: CPT | Performed by: EMERGENCY MEDICINE

## 2023-03-11 PROCEDURE — 80053 COMPREHEN METABOLIC PANEL: CPT | Performed by: EMERGENCY MEDICINE

## 2023-03-11 PROCEDURE — 87040 BLOOD CULTURE FOR BACTERIA: CPT | Performed by: EMERGENCY MEDICINE

## 2023-03-11 PROCEDURE — 82962 GLUCOSE BLOOD TEST: CPT

## 2023-03-11 PROCEDURE — 85025 COMPLETE CBC W/AUTO DIFF WBC: CPT | Performed by: EMERGENCY MEDICINE

## 2023-03-11 PROCEDURE — 83050 HGB METHEMOGLOBIN QUAN: CPT | Performed by: EMERGENCY MEDICINE

## 2023-03-11 PROCEDURE — 25010000002 LORAZEPAM PER 2 MG: Performed by: EMERGENCY MEDICINE

## 2023-03-11 PROCEDURE — 36600 WITHDRAWAL OF ARTERIAL BLOOD: CPT | Performed by: EMERGENCY MEDICINE

## 2023-03-11 PROCEDURE — 93005 ELECTROCARDIOGRAM TRACING: CPT | Performed by: EMERGENCY MEDICINE

## 2023-03-11 PROCEDURE — 83735 ASSAY OF MAGNESIUM: CPT | Performed by: EMERGENCY MEDICINE

## 2023-03-11 PROCEDURE — 82805 BLOOD GASES W/O2 SATURATION: CPT | Performed by: EMERGENCY MEDICINE

## 2023-03-11 PROCEDURE — 82077 ASSAY SPEC XCP UR&BREATH IA: CPT | Performed by: EMERGENCY MEDICINE

## 2023-03-11 PROCEDURE — 81001 URINALYSIS AUTO W/SCOPE: CPT | Performed by: EMERGENCY MEDICINE

## 2023-03-11 PROCEDURE — 80164 ASSAY DIPROPYLACETIC ACD TOT: CPT | Performed by: EMERGENCY MEDICINE

## 2023-03-11 PROCEDURE — 25010000002 KETOROLAC TROMETHAMINE PER 15 MG: Performed by: EMERGENCY MEDICINE

## 2023-03-11 PROCEDURE — 36415 COLL VENOUS BLD VENIPUNCTURE: CPT

## 2023-03-11 PROCEDURE — 83605 ASSAY OF LACTIC ACID: CPT | Performed by: EMERGENCY MEDICINE

## 2023-03-11 PROCEDURE — 0 LEVETIRACETAM IN NACL 0.75% 1000 MG/100ML SOLUTION: Performed by: EMERGENCY MEDICINE

## 2023-03-11 PROCEDURE — 96375 TX/PRO/DX INJ NEW DRUG ADDON: CPT

## 2023-03-11 PROCEDURE — 85007 BL SMEAR W/DIFF WBC COUNT: CPT | Performed by: EMERGENCY MEDICINE

## 2023-03-11 PROCEDURE — 93005 ELECTROCARDIOGRAM TRACING: CPT

## 2023-03-11 PROCEDURE — 71045 X-RAY EXAM CHEST 1 VIEW: CPT

## 2023-03-11 RX ORDER — KETOROLAC TROMETHAMINE 30 MG/ML
30 INJECTION, SOLUTION INTRAMUSCULAR; INTRAVENOUS ONCE
Status: COMPLETED | OUTPATIENT
Start: 2023-03-11 | End: 2023-03-11

## 2023-03-11 RX ORDER — SODIUM CHLORIDE 0.9 % (FLUSH) 0.9 %
10 SYRINGE (ML) INJECTION AS NEEDED
Status: DISCONTINUED | OUTPATIENT
Start: 2023-03-11 | End: 2023-03-11 | Stop reason: HOSPADM

## 2023-03-11 RX ORDER — LEVETIRACETAM 10 MG/ML
1000 INJECTION INTRAVASCULAR ONCE
Status: COMPLETED | OUTPATIENT
Start: 2023-03-11 | End: 2023-03-11

## 2023-03-11 RX ORDER — LORAZEPAM 2 MG/ML
INJECTION INTRAMUSCULAR
Status: DISCONTINUED
Start: 2023-03-11 | End: 2023-03-11 | Stop reason: HOSPADM

## 2023-03-11 RX ORDER — LEVETIRACETAM 750 MG/1
1500 TABLET ORAL 2 TIMES DAILY
Qty: 120 TABLET | Refills: 0 | Status: SHIPPED | OUTPATIENT
Start: 2023-03-11

## 2023-03-11 RX ORDER — DIVALPROEX SODIUM 500 MG/1
500 TABLET, DELAYED RELEASE ORAL 2 TIMES DAILY
Qty: 60 TABLET | Refills: 0 | Status: SHIPPED | OUTPATIENT
Start: 2023-03-11

## 2023-03-11 RX ORDER — LORAZEPAM 2 MG/ML
1 INJECTION INTRAMUSCULAR ONCE
Status: COMPLETED | OUTPATIENT
Start: 2023-03-11 | End: 2023-03-11

## 2023-03-11 RX ADMIN — SODIUM CHLORIDE, POTASSIUM CHLORIDE, SODIUM LACTATE AND CALCIUM CHLORIDE 1914 ML: 600; 310; 30; 20 INJECTION, SOLUTION INTRAVENOUS at 17:38

## 2023-03-11 RX ADMIN — KETOROLAC TROMETHAMINE 30 MG: 30 INJECTION, SOLUTION INTRAMUSCULAR; INTRAVENOUS at 15:11

## 2023-03-11 RX ADMIN — LORAZEPAM 1 MG: 2 INJECTION INTRAMUSCULAR; INTRAVENOUS at 16:41

## 2023-03-11 RX ADMIN — SODIUM CHLORIDE 1000 ML: 9 INJECTION, SOLUTION INTRAVENOUS at 14:19

## 2023-03-11 RX ADMIN — Medication 10 ML: at 19:11

## 2023-03-11 RX ADMIN — LEVETIRACETAM 1000 MG: 10 INJECTION INTRAVENOUS at 17:03

## 2023-03-11 NOTE — ED PROVIDER NOTES
Time: 2:04 PM EST  Date of encounter:  3/11/2023  Independent Historian/Clinical History and Information was obtained by:   Patient  Chief Complaint: seizures    History is limited by: N/A    History of Present Illness:  Patient is a 27 y.o. year old male who presents to the emergency department for evaluation of seizure activity.    Patient reports he takes Depakote and Keppra for seizure history, but has not had a refill for medications.           Patient Care Team  Primary Care Provider: Provider, Liliam Known    Past Medical History:     No Known Allergies  Past Medical History:   Diagnosis Date   • Seizures (HCC)      History reviewed. No pertinent surgical history.  History reviewed. No pertinent family history.    Home Medications:  Prior to Admission medications    Medication Sig Start Date End Date Taking? Authorizing Provider   diclofenac (VOLTAREN) 50 MG EC tablet Take 50 mg by mouth 2 (Two) Times a Day As Needed.    ProviderPaulino MD   divalproex (DEPAKOTE ER) 500 MG 24 hr tablet Take 2 tablets by mouth Daily for 30 days. 9/3/22 10/3/22  Bernard Martinez DO   levETIRAcetam (KEPPRA) 750 MG tablet Take 2 tablets by mouth 2 (Two) Times a Day for 30 days. 9/2/22 10/2/22  Bernard Martinez DO   OXcarbazepine (TRILEPTAL) 600 MG tablet Take 600 mg by mouth 2 (Two) Times a Day.    ProviderPaulino MD   propranolol (INDERAL) 40 MG tablet Take 40 mg by mouth 2 (Two) Times a Day.    ProviderPaulino MD        Social History:   Social History     Tobacco Use   • Smoking status: Every Day     Packs/day: 1.00     Types: Cigarettes   Substance Use Topics   • Alcohol use: Yes   • Drug use: Yes     Types: Methamphetamines, Marijuana     Comment: states last meth use was long ago         Review of Systems:  Review of Systems   Constitutional: Negative for chills and fever.   HENT: Negative for sore throat.    Eyes: Negative for photophobia.   Respiratory: Negative for shortness of breath.    Cardiovascular:  "Negative for chest pain.   Gastrointestinal: Negative for abdominal pain, diarrhea, nausea and vomiting.   Genitourinary: Negative for dysuria.   Musculoskeletal: Negative for neck pain.   Skin: Negative for wound.   Neurological: Positive for seizures. Negative for headaches.   All other systems reviewed and are negative.       Physical Exam:  /87   Pulse 53   Resp 18   Ht 167.6 cm (66\")   Wt 111 kg (244 lb 11.4 oz)   SpO2 97%   BMI 39.50 kg/m²     Physical Exam  Vitals and nursing note reviewed.   Constitutional:       General: He is not in acute distress.  HENT:      Head: Normocephalic and atraumatic.   Eyes:      Extraocular Movements: Extraocular movements intact.   Cardiovascular:      Rate and Rhythm: Normal rate and regular rhythm.   Pulmonary:      Effort: Pulmonary effort is normal. No respiratory distress.      Breath sounds: Normal breath sounds.   Abdominal:      General: Abdomen is flat.      Palpations: Abdomen is soft.      Tenderness: There is no abdominal tenderness.   Musculoskeletal:         General: Normal range of motion.      Cervical back: Normal range of motion and neck supple.   Skin:     General: Skin is warm and dry.      Capillary Refill: Capillary refill takes less than 2 seconds.   Neurological:      Mental Status: He is alert and oriented to person, place, and time. Mental status is at baseline.                  Procedures:  Procedures      Medical Decision Making:      Comorbidities that affect care:    Seizure history    External Notes reviewed:    None      The following orders were placed and all results were independently analyzed by me:  Orders Placed This Encounter   Procedures   • [place] Draw   • Comprehensive Metabolic Panel   • Valproic Acid Level, Total   • CBC Auto Differential   • Urine Drug Screen - Urine, Clean Catch   • NPO Diet NPO Type: Strict NPO   • Cardiac Monitoring   • Continuous Pulse Oximetry   • Vital Signs   • Oxygen Therapy- Nasal Cannula; 2 " LPM; Titrate for SPO2: equal to or greater than, 92%   • POC Glucose Once   • POC Glucose Once   • Insert Peripheral IV   • Seizure Precautions   • CBC & Differential   • Green Top (Gel)   • Lavender Top   • Gold Top - SST   • Light Blue Top       Medications Given in the Emergency Department:  Medications   sodium chloride 0.9 % flush 10 mL (has no administration in time range)   sodium chloride 0.9 % bolus 1,000 mL (1,000 mL Intravenous New Bag 3/11/23 1419)   ketorolac (TORADOL) injection 30 mg (has no administration in time range)        ED Course:         Labs:    Lab Results (last 24 hours)     Procedure Component Value Units Date/Time    CBC & Differential [[id number]]  (Abnormal) Collected: 03/11/23 1304    Specimen: Blood Updated: 03/11/23 1314    Narrative:      The following orders were created for panel order CBC & Differential.  Procedure                               Abnormality         Status                     ---------                               -----------         ------                     CBC Auto Differential[[id number]]        Abnormal            Final result                 Please view results for these tests on the individual orders.    Comprehensive Metabolic Panel [[id number]]  (Abnormal) Collected: 03/11/23 1304    Specimen: Blood Updated: 03/11/23 1345     Glucose 183 mg/dL      BUN 18 mg/dL      Creatinine 1.04 mg/dL      Sodium 134 mmol/L      Potassium 3.5 mmol/L      Chloride 97 mmol/L      CO2 10.5 mmol/L      Calcium 9.6 mg/dL      Total Protein 7.9 g/dL      Albumin 5.0 g/dL      ALT (SGPT) 43 U/L      AST (SGOT) 29 U/L      Alkaline Phosphatase 119 U/L      Total Bilirubin 0.2 mg/dL      Globulin 2.9 gm/dL      A/G Ratio 1.7 g/dL      BUN/Creatinine Ratio 17.3     Anion Gap 26.5 mmol/L      eGFR 101.6 mL/min/1.73     Narrative:      GFR Normal >60  Chronic Kidney Disease <60  Kidney Failure <15      Valproic Acid Level, Total [[id number]]  (Abnormal) Collected: 03/11/23 1304     Specimen: Blood Updated: 03/11/23 1403     Valproic Acid <2.8 mcg/mL     Narrative:      Therapeutic Ranges for Valproic Acid    Epilepsy:       mcg/ml  Bipolar/Leticia  up to 125 mcg/ml      CBC Auto Differential [[id number]]  (Abnormal) Collected: 03/11/23 1304    Specimen: Blood Updated: 03/11/23 1314     WBC 21.56 10*3/mm3      RBC 6.19 10*6/mm3      Hemoglobin 17.9 g/dL      Hematocrit 51.2 %      MCV 82.7 fL      MCH 28.9 pg      MCHC 35.0 g/dL      RDW 12.6 %      RDW-SD 37.6 fl      MPV 9.7 fL      Platelets 379 10*3/mm3      Neutrophil % 73.7 %      Lymphocyte % 16.9 %      Monocyte % 7.8 %      Eosinophil % 0.6 %      Basophil % 0.4 %      Immature Grans % 0.6 %      Neutrophils, Absolute 15.90 10*3/mm3      Lymphocytes, Absolute 3.64 10*3/mm3      Monocytes, Absolute 1.68 10*3/mm3      Eosinophils, Absolute 0.13 10*3/mm3      Basophils, Absolute 0.08 10*3/mm3      Immature Grans, Absolute 0.13 10*3/mm3      nRBC 0.0 /100 WBC     POC Glucose Once [[id number]]  (Abnormal) Collected: 03/11/23 1315    Specimen: Blood Updated: 03/11/23 1317     Glucose 233 mg/dL      Comment: Serial Number: [id number]:  [id number]       Urine Drug Screen - Urine, Clean Catch [[id number]]  (Abnormal) Collected: 03/11/23 1403    Specimen: Urine, Clean Catch Updated: 03/11/23 1436     Amphet/Methamphet, Screen Negative     Barbiturates Screen, Urine Negative     Benzodiazepine Screen, Urine Negative     Cocaine Screen, Urine Negative     Opiate Screen Negative     THC, Screen, Urine Positive     Methadone Screen, Urine Negative     Oxycodone Screen, Urine Negative    Narrative:      Negative Thresholds Per Drugs Screened:    Amphetamines                 500 ng/ml  Barbiturates                 200 ng/ml  Benzodiazepines              100 ng/ml  Cocaine                      300 ng/ml  Methadone                    300 ng/ml  Opiates                      300 ng/ml  Oxycodone                    100 ng/ml  THC                            50 ng/ml    The Normal Value for all drugs tested is negative. This report includes final unconfirmed screening results to be used for medical treatment purposes only. Unconfirmed results must not be used for non-medical purposes such as employment or legal testing. Clinical consideration should be applied to any drug of abuse test, particularly when unconfirmed results are used.                   Imaging:    No Radiology Exams Resulted Within Past 24 Hours      Differential Diagnosis and Discussion:    Seizure: Differential diagnosis includes but is not limited to meningitis, hypoglycemia, electrolyte abnormalities, intracranial hemorrhage, toxin induced, and pseudoseizure.    All labs were reviewed and interpreted by me.    MDM   After patient exam he is requesting to be able to be discharged.  I offered to give him an IV dose of Keppra, however, he is declining and would just like to be discharged with prescription sent to his pharmacy.  Patient is currently in between neurologist and he was given Dr. Marquis's information as he was seen by him in the hospital previously.      Patient Care Considerations:    CT HEAD: I considered ordering a noncontrast CT of the head, however Patient is declining further work-up      Consultants/Shared Management Plan:    None    Social Determinants of Health:    Patient is independent, reliable, and has access to care.       Disposition and Care Coordination:    Discharged: The patient is suitable and stable for discharge with no need for consideration of observation or admission.    I have explained discharge medications and the need for follow up with the patient/caretakers. This was also printed in the discharge instructions. Patient was discharged with the following medications and follow up:      Medication List      Changed    * divalproex 500 MG 24 hr tablet  Commonly known as: DEPAKOTE ER  Take 2 tablets by mouth Daily for 30 days.  What changed: Another  medication with the same name was added. Make sure you understand how and when to take each.     * divalproex 500 MG DR tablet  Commonly known as: DEPAKOTE  Take 1 tablet by mouth 2 (Two) Times a Day.  What changed: You were already taking a medication with the same name, and this prescription was added. Make sure you understand how and when to take each.     * levETIRAcetam 750 MG tablet  Commonly known as: KEPPRA  Take 2 tablets by mouth 2 (Two) Times a Day for 30 days.  What changed: Another medication with the same name was added. Make sure you understand how and when to take each.     * levETIRAcetam 750 MG tablet  Commonly known as: KEPPRA  Take 2 tablets by mouth 2 (Two) Times a Day.  What changed: You were already taking a medication with the same name, and this prescription was added. Make sure you understand how and when to take each.         * This list has 4 medication(s) that are the same as other medications prescribed for you. Read the directions carefully, and ask your doctor or other care provider to review them with you.               Where to Get Your Medications      These medications were sent to [facility] [address] [place] [address] - [address] [facility] - [phone] [facility] [phone]   [address] [id number]    Phone: [phone]   · divalproex 500 MG DR tablet  · levETIRAcetam 750 MG tablet      Vinay Marquis MD  [address]  [facility] [address]  [facility] [address]  [phone]    Schedule an appointment as soon as possible for a visit          Final diagnoses:   Seizure (HCC)        ED Disposition     ED Disposition   Discharge    Condition   Stable    Comment   --             This medical record created using voice recognition software.    Akiko NINO, am scribing for and in the presence of Mihir Carpenter DO. 3/11/2023 14:39 EST    Jayden NINO Nick, DO personally performed the services described in this documentation, as scribed by Akiko Aguilera in my  presence, and it is both accurate and complete.        Akiko Aguilera  03/11/23 1438       Mihir Carpenter DO  03/11/23 1433

## 2023-03-11 NOTE — ED PROVIDER NOTES
Time: 4:56 PM EST  Date of encounter:  3/11/2023  Independent Historian/Clinical History and Information was obtained by:   EMS  Chief Complaint: Seizures    History is limited by: Acuity of Condition    History of Present Illness:  Patient is a 27 y.o. year old male who presents to the emergency department for evaluation of multiple seizures today.    Patient was in the ED this morning for seizures and was seen by Dr. Carpenter. Per EMS, patient was AMA before [facility]. Patient told nursing staff he was out of [facility] and all his medications for 3 days. When he got home he had two seizures, unsure of how long it lasted. Per EMS, patient came in seizing. Patient was administered 1 mg Ativan upon arrival.  Patient is not postictal        Patient Care Team  Primary Care Provider: Provider, Liliam Known    Past Medical History:     No Known Allergies  Past Medical History:   Diagnosis Date   • Seizures (HCC)      History reviewed. No pertinent surgical history.  History reviewed. No pertinent family history.    Home Medications:  Prior to Admission medications    Medication Sig Start Date End Date Taking? Authorizing Provider   diclofenac (VOLTAREN) 50 MG EC tablet Take 50 mg by mouth 2 (Two) Times a Day As Needed.    Paulino Cardoza MD   divalproex (DEPAKOTE ER) 500 MG 24 hr tablet Take 2 tablets by mouth Daily for 30 days. 9/3/22 10/3/22  Bernard Martinez DO   divalproex (DEPAKOTE) 500 MG DR tablet Take 1 tablet by mouth 2 (Two) Times a Day. 3/11/23   Mihir Carpenter DO   levETIRAcetam (KEPPRA) 750 MG tablet Take 2 tablets by mouth 2 (Two) Times a Day for 30 days. 9/2/22 10/2/22  Bernard Martinez DO   levETIRAcetam (KEPPRA) 750 MG tablet Take 2 tablets by mouth 2 (Two) Times a Day. 3/11/23   Mihir Carpenter DO   OXcarbazepine (TRILEPTAL) 600 MG tablet Take 600 mg by mouth 2 (Two) Times a Day.    Paulino Cardoza MD   propranolol (INDERAL) 40 MG tablet Take 40 mg by mouth 2 (Two) Times a Day.    Paulino Cardoza MD  "       Social History:   Social History     Tobacco Use   • Smoking status: Every Day     Packs/day: 1.00     Types: Cigarettes   Substance Use Topics   • Alcohol use: Yes   • Drug use: Yes     Types: Methamphetamines, Marijuana     Comment: states last meth use was long ago         Review of Systems:  Review of Systems   Unable to perform ROS: Acuity of condition   Neurological: Positive for seizures.        Physical Exam:  /46   Pulse 75   Temp 95.5 °F (35.3 °C) (Rectal)   Resp 26   Ht 167.6 cm (66\")   Wt 111 kg (244 lb 11.4 oz)   SpO2 91%   BMI 39.50 kg/m²     Physical Exam  Vitals and nursing note reviewed.   Constitutional:       General: He is not in acute distress.     Appearance: Normal appearance. He is not ill-appearing, toxic-appearing or diaphoretic.   HENT:      Head: Normocephalic and atraumatic.      Comments: No sign of head injury.     Mouth/Throat:      Mouth: Mucous membranes are moist.      Comments: Gag reflex. Abrasions on tongue. Could not see any lacerations.   Eyes:      Pupils: Pupils are equal, round, and reactive to light.   Cardiovascular:      Rate and Rhythm: Normal rate and regular rhythm.      Pulses: Normal pulses.           Carotid pulses are 2+ on the right side and 2+ on the left side.       Radial pulses are 2+ on the right side and 2+ on the left side.        Femoral pulses are 2+ on the right side and 2+ on the left side.       Popliteal pulses are 2+ on the right side and 2+ on the left side.        Dorsalis pedis pulses are 2+ on the right side and 2+ on the left side.        Posterior tibial pulses are 2+ on the right side and 2+ on the left side.      Heart sounds: Normal heart sounds. No murmur heard.  Pulmonary:      Effort: Pulmonary effort is normal. No accessory muscle usage, respiratory distress or retractions.      Breath sounds: Normal breath sounds. No wheezing, rhonchi or rales.   Abdominal:      General: Abdomen is flat. There is no distension.      " Palpations: Abdomen is soft. There is no mass.      Tenderness: There is no abdominal tenderness. There is no right CVA tenderness, left CVA tenderness, guarding or rebound.      Comments: No rigidity   Musculoskeletal:         General: No swelling, tenderness or deformity.      Cervical back: Normal range of motion and neck supple. No rigidity or tenderness.      Right lower leg: No edema.      Left lower leg: No edema.      Comments: No signs of trauma. Patient can move arms and legs.    Skin:     General: Skin is warm and dry.      Capillary Refill: Capillary refill takes less than 2 seconds.      Coloration: Skin is not jaundiced or pale.      Findings: No erythema.   Neurological:      General: No focal deficit present.      Mental Status: He is alert and oriented to person, place, and time. Mental status is at baseline.      Sensory: No sensory deficit.      Motor: No weakness.      Comments: Patient is responsive to verbal stimulus, opens his eyes.    Psychiatric:         Mood and Affect: Mood normal.         Behavior: Behavior normal.      Comments: Speech is incomprehensible. Can follow commands.                  Procedures:  Procedures      Medical Decision Making:      Comorbidities that affect care:    Seizures, Smoking    External Notes reviewed:    None      The following orders were placed and all results were independently analyzed by me:  Orders Placed This Encounter   Procedures   • Blood Culture - Blood,   • Blood Culture - Blood,   • XR Chest 1 View   • Valproic Acid Level, Total   • Comprehensive Metabolic Panel   • [place] Draw   • Urinalysis With Microscopic If Indicated (No Culture) - Urine, Clean Catch   • CBC Auto Differential   • Scan Slide   • ABG with Co-Ox and Electrolytes   • Urine Drug Screen - Urine, Clean Catch   • Urinalysis, Microscopic Only - Urine, Clean Catch   • Magnesium   • Ethanol   • Lactic Acid, Plasma   • STAT Lactic Acid, Reflex   • Undress & Gown   • Continuous Pulse  Oximetry   • Vital Signs   • Ceribell for 2 hours  Nursing Communication   • POC Glucose Once   • POC Glucose Once   • ECG 12 Lead ED Triage Standing Order; Weak / Dizzy / AMS   • CBC & Differential   • Green Top (Gel)   • Lavender Top   • Gold Top - SST   • Light Blue Top       Medications Given in the Emergency Department:  Medications   LORazepam (ATIVAN) injection 1 mg (1 mg Intravenous Given 3/11/23 1641)   levETIRAcetam in NaCl 0.75% (KEPPRA) IVPB 1,000 mg (0 mg Intravenous Stopped 3/11/23 1711)   lactated ringers bolus 1,914 mL (0 mL Intravenous Stopped 3/11/23 1950)        ED Course:    ED Course as of 03/13/23 0157   Sat Mar 11, 2023   1643 EKG:    Rhythm: Sinus bradycardia  Rate: 52  Intervals: Normal IN and QT interval  T-wave: Slightly peaked T waves in the precordial leads but nonspecific  ST Segment: Nonspecific ST segments II, III, aVF, no obvious pathological ST elevation or reciprocal ST depression to suggest acute myocardial infarction    EKG Comparison: Unavailable    Interpreted by me   [SD]      ED Course User Index  [SD] Tito Arango DO       Labs:    Lab Results (last 24 hours)     ** No results found for the last 24 hours. **           Imaging:    No Radiology Exams Resulted Within Past 24 Hours      Differential Diagnosis and Discussion:    Seizure: Differential diagnosis includes but is not limited to meningitis, hypoglycemia, electrolyte abnormalities, intracranial hemorrhage, toxin induced, and pseudoseizure.    All labs were reviewed and interpreted by me.  EKG was interpreted by me.    MDM  Number of Diagnoses or Management Options  Lactic acidosis  Metabolic acidosis  Seizure (HCC)  Status epilepticus (HCC)  Diagnosis management comments: Ceribell examination was performed there was 0% seizure burden    Patient eventually returned back to his baseline mental status.  Patient's vital signs were stable in the emergency room.  Patient's urine toxicology was positive for marijuana.  The  patient's alcohol was 0.  The patient's magnesium was high at 2.8.  The patient's CBC demonstrates a white blood cell count of 33,000, hemoglobin at 18, hematocrit 56, neutrophil count of 24,000, no bandemia.  Patient's lactate initially was 13.3.  The patient was given a 30 cc/kg bolus of lactated Ringer's and the repeat lactate was 2.3.  Patient's blood gas demonstrates a pH of 6.99, CO2 of 39, PO2 127.  Indicating a metabolic acidosis most likely from the lactic acidosis.  Patient's urine was without infection.  Patient's EKG demonstrates a sinus bradycardia with a rate of 52.  Patient's chest x-ray demonstrated poor inspiratory effort but no obvious focal infiltrates or acute disease.    I told the patient I felt he should be admitted to the hospital for status epilepticus and the severe lactic acidosis.  The patient was awake, alert, oriented x3, cooperative and pleasant..  The patient's speech was normal.  The patient was ambulatory.  The patient is signing out AGAINST MEDICAL ADVICE.  The patient has a capacity make his own medical decision making    The patient has decided to leave our facility AGAINST MEDICAL ADVICE.  I have assessed the patient's ability to make informed decision and its of my opinion at this time that the patient has the medical decision-making capacity to comprehend information regarding current medical condition and appreciates the impact of the disease or condition and the consequences of various options for treatment, including foregoing treatment.  The patient possesses the ability to evaluate all treatment options, compare the risks and benefits of each option, communicate choice in a consistent manner over time and is able to make rational choices.  I have explained to the patient the further testing, treatment and evaluation I would like to perform during the current emergency department visit as well as any possible alternatives that could be accomplished in a timely manner.  I  have outlined the possible risks of foregoing any/ or  all of these interventions and the patient understands and acknowledges that the decision to leave may result in undesirable consequences such as death, permanent disability and/or loss of current lifestyle.  Even though leaving AMA is not ideal, I have instructed the patient to follow any discharge instructions given, take any medications prescribed and resume care as soon as possible with any other provider.  Additionally, we currently stated that the patient is welcome to return at any time to continue care at our facility.       Amount and/or Complexity of Data Reviewed  Clinical lab tests: reviewed  Tests in the radiology section of CPT®: reviewed  Tests in the medicine section of CPT®: reviewed    Critical Care  Total time providing critical care: 35 minutes (Total critical care time of.  Total critical care time documented does not include time spent on separately billed procedures for services of nurses or physician assistants.  I personally saw and examined the patient.  I have reviewed all diagnostic interpretations and treatment plans as written.  I was present for the key portions of any procedures performed and the inclusive time noted in any critical care statement.  Critical care time includes patient management by me, time spent at the patient bedside, time to review labs/ ABG's, imaging results, discussing patient care, documentation in the medical record and time spent with family or caregiver)         Social Determinants of Health:    Patient is independent, reliable, and has access to care.       Disposition and Care Coordination:    AMA:         Final diagnoses:   Seizure (HCC)   Status epilepticus (HCC)   Lactic acidosis   Metabolic acidosis        ED Disposition     ED Disposition   AMA    Condition   --    Comment   --             This medical record created using voice recognition software.     Documentation assistance provided by  Jelena Guevara acting as scribe for No att. providers found. Information recorded by the scribe was done at my direction and has been verified and validated by me.              Jelena Guevara  03/11/23 1715       Jelena Guevara  03/11/23 1717       Tito Arango DO  03/13/23 0157

## 2023-03-11 NOTE — ED NOTES
Pt declined IV seizure medications. Pt wanted refills for his PO seizure meds so he could leave. Pt left with family

## 2023-03-12 LAB — QT INTERVAL: 398 MS

## 2023-03-16 LAB
BACTERIA SPEC AEROBE CULT: NORMAL
BACTERIA SPEC AEROBE CULT: NORMAL

## 2023-06-06 ENCOUNTER — HOSPITAL ENCOUNTER (EMERGENCY)
Facility: HOSPITAL | Age: 28
Discharge: LEFT BEFORE DISCHARGE | End: 2023-06-06
Payer: COMMERCIAL

## 2023-06-06 PROCEDURE — 99211 OFF/OP EST MAY X REQ PHY/QHP: CPT

## 2023-06-06 NOTE — ED NOTES
"Shortly after patient arrival to room patient requests to leave.  I spoke in length with patient regarding need for medical eval due to have 2 major seizures today.  Patient is a+ox4, states \" I have seizures all the time, im fine\" patient states \" I didn't want to come to the hospital but the  said they had to bring me in\"  patient voices understanding of possible consequnces of leaving without further medical care including incidence of more seizures.  Patient taken to lobby in wheelchair with no distress noted.   "

## 2023-06-06 NOTE — ED NOTES
Per ems patient had 2 seizures today, lasting approx 5min in duration, patient has known history of elipepsy

## 2023-07-25 ENCOUNTER — HOSPITAL ENCOUNTER (EMERGENCY)
Facility: HOSPITAL | Age: 28
Discharge: HOME | End: 2023-07-25
Attending: EMERGENCY MEDICINE | Admitting: EMERGENCY MEDICINE
Payer: COMMERCIAL

## 2023-07-25 VITALS
WEIGHT: 244.49 LBS | HEART RATE: 61 BPM | TEMPERATURE: 97.4 F | BODY MASS INDEX: 39.46 KG/M2 | SYSTOLIC BLOOD PRESSURE: 126 MMHG | DIASTOLIC BLOOD PRESSURE: 73 MMHG | OXYGEN SATURATION: 98 % | RESPIRATION RATE: 16 BRPM

## 2023-07-25 DIAGNOSIS — R56.9 SEIZURE: Primary | ICD-10-CM

## 2023-07-25 DIAGNOSIS — S00.83XA CONTUSION OF FOREHEAD, INITIAL ENCOUNTER: ICD-10-CM

## 2023-07-25 LAB
ALBUMIN SERPL-MCNC: 4.7 G/DL (ref 3.5–5.2)
ALBUMIN/GLOB SERPL: 1.7 G/DL
ALP SERPL-CCNC: 87 U/L (ref 39–117)
ALT SERPL W P-5'-P-CCNC: 27 U/L (ref 1–41)
ANION GAP SERPL CALCULATED.3IONS-SCNC: 13.2 MMOL/L (ref 5–15)
AST SERPL-CCNC: 25 U/L (ref 1–40)
BASOPHILS # BLD AUTO: 0.03 10*3/MM3 (ref 0–0.2)
BASOPHILS NFR BLD AUTO: 0.3 % (ref 0–1.5)
BILIRUB SERPL-MCNC: 0.2 MG/DL (ref 0–1.2)
BUN SERPL-MCNC: 12 MG/DL (ref 6–20)
BUN/CREAT SERPL: 10.1 (ref 7–25)
CALCIUM SPEC-SCNC: 9.2 MG/DL (ref 8.6–10.5)
CHLORIDE SERPL-SCNC: 103 MMOL/L (ref 98–107)
CO2 SERPL-SCNC: 20.8 MMOL/L (ref 22–29)
CREAT SERPL-MCNC: 1.19 MG/DL (ref 0.76–1.27)
DEPRECATED RDW RBC AUTO: 37 FL (ref 37–54)
EGFRCR SERPLBLD CKD-EPI 2021: 86.4 ML/MIN/1.73
EOSINOPHIL # BLD AUTO: 0.09 10*3/MM3 (ref 0–0.4)
EOSINOPHIL NFR BLD AUTO: 0.8 % (ref 0.3–6.2)
ERYTHROCYTE [DISTWIDTH] IN BLOOD BY AUTOMATED COUNT: 12.1 % (ref 12.3–15.4)
GLOBULIN UR ELPH-MCNC: 2.7 GM/DL
GLUCOSE SERPL-MCNC: 111 MG/DL (ref 65–99)
HCT VFR BLD AUTO: 48.8 % (ref 37.5–51)
HGB BLD-MCNC: 17.1 G/DL (ref 13–17.7)
HOLD SPECIMEN: NORMAL
HOLD SPECIMEN: NORMAL
IMM GRANULOCYTES # BLD AUTO: 0.05 10*3/MM3 (ref 0–0.05)
IMM GRANULOCYTES NFR BLD AUTO: 0.5 % (ref 0–0.5)
LYMPHOCYTES # BLD AUTO: 1.17 10*3/MM3 (ref 0.7–3.1)
LYMPHOCYTES NFR BLD AUTO: 11 % (ref 19.6–45.3)
MCH RBC QN AUTO: 29.6 PG (ref 26.6–33)
MCHC RBC AUTO-ENTMCNC: 35 G/DL (ref 31.5–35.7)
MCV RBC AUTO: 84.6 FL (ref 79–97)
MONOCYTES # BLD AUTO: 0.87 10*3/MM3 (ref 0.1–0.9)
MONOCYTES NFR BLD AUTO: 8.2 % (ref 5–12)
NEUTROPHILS NFR BLD AUTO: 79.2 % (ref 42.7–76)
NEUTROPHILS NFR BLD AUTO: 8.45 10*3/MM3 (ref 1.7–7)
NRBC BLD AUTO-RTO: 0 /100 WBC (ref 0–0.2)
PLATELET # BLD AUTO: 270 10*3/MM3 (ref 140–450)
PMV BLD AUTO: 9.4 FL (ref 6–12)
POTASSIUM SERPL-SCNC: 4.5 MMOL/L (ref 3.5–5.2)
PROT SERPL-MCNC: 7.4 G/DL (ref 6–8.5)
QT INTERVAL: 383 MS
RBC # BLD AUTO: 5.77 10*6/MM3 (ref 4.14–5.8)
SODIUM SERPL-SCNC: 137 MMOL/L (ref 136–145)
WBC NRBC COR # BLD: 10.66 10*3/MM3 (ref 3.4–10.8)
WHOLE BLOOD HOLD COAG: NORMAL
WHOLE BLOOD HOLD SPECIMEN: NORMAL

## 2023-07-25 PROCEDURE — 85025 COMPLETE CBC W/AUTO DIFF WBC: CPT | Performed by: EMERGENCY MEDICINE

## 2023-07-25 PROCEDURE — 80053 COMPREHEN METABOLIC PANEL: CPT | Performed by: EMERGENCY MEDICINE

## 2023-07-25 PROCEDURE — 93005 ELECTROCARDIOGRAM TRACING: CPT

## 2023-07-25 PROCEDURE — 36415 COLL VENOUS BLD VENIPUNCTURE: CPT

## 2023-07-25 PROCEDURE — 99284 EMERGENCY DEPT VISIT MOD MDM: CPT

## 2023-07-25 PROCEDURE — 93010 ELECTROCARDIOGRAM REPORT: CPT | Performed by: INTERNAL MEDICINE

## 2023-07-25 PROCEDURE — 93005 ELECTROCARDIOGRAM TRACING: CPT | Performed by: EMERGENCY MEDICINE

## 2023-07-25 RX ORDER — SODIUM CHLORIDE 0.9 % (FLUSH) 0.9 %
10 SYRINGE (ML) INJECTION AS NEEDED
Status: DISCONTINUED | OUTPATIENT
Start: 2023-07-25 | End: 2023-07-25 | Stop reason: HOSPADM

## 2023-07-25 NOTE — ED PROVIDER NOTES
Time: 2:40 PM EDT  Date of encounter:  7/25/2023  Independent Historian/Clinical History and Information was obtained by:   Patient    History is limited by: N/A    Chief Complaint: Seizure      History of Present Illness:  Patient is a 27 y.o. year old male who presents to the emergency department for evaluation of seizure activity.  Patient has known seizure disorder and presents to the emergency department slightly postictal after having a seizure today.  Patient reports compliance with his antiepileptic medication regimen.  He did hit his forehead today but denies headache or head pain as result.    HPI    Patient Care Team  Primary Care Provider: Provider, Liliam Known    Past Medical History:     No Known Allergies  Past Medical History:   Diagnosis Date    Seizures      No past surgical history on file.  No family history on file.    Home Medications:  Prior to Admission medications    Medication Sig Start Date End Date Taking? Authorizing Provider   diclofenac (VOLTAREN) 50 MG EC tablet Take 50 mg by mouth 2 (Two) Times a Day As Needed.    ProviderPaulino MD   divalproex (DEPAKOTE ER) 500 MG 24 hr tablet Take 2 tablets by mouth Daily for 30 days. 9/3/22 10/3/22  Bernard Martinez DO   divalproex (DEPAKOTE) 500 MG DR tablet Take 1 tablet by mouth 2 (Two) Times a Day. 3/11/23   Mihir Carpenter DO   levETIRAcetam (KEPPRA) 750 MG tablet Take 2 tablets by mouth 2 (Two) Times a Day for 30 days. 9/2/22 10/2/22  Bernard Martinez DO   levETIRAcetam (KEPPRA) 750 MG tablet Take 2 tablets by mouth 2 (Two) Times a Day. 3/11/23   Mihir Carpenter DO   OXcarbazepine (TRILEPTAL) 600 MG tablet Take 600 mg by mouth 2 (Two) Times a Day.    ProviderPaulino MD   propranolol (INDERAL) 40 MG tablet Take 40 mg by mouth 2 (Two) Times a Day.    ProviderPaulino MD        Social History:   Social History     Tobacco Use    Smoking status: Every Day     Packs/day: 1.00     Types: Cigarettes   Substance Use Topics    Alcohol use:  Yes    Drug use: Yes     Types: Methamphetamines, Marijuana     Comment: states last meth use was long ago         Review of Systems:  Review of Systems   Constitutional:  Negative for chills and fever.   HENT:  Negative for congestion, rhinorrhea and sore throat.    Eyes:  Negative for pain and visual disturbance.   Respiratory:  Negative for apnea, cough, chest tightness and shortness of breath.    Cardiovascular:  Negative for chest pain and palpitations.   Gastrointestinal:  Negative for abdominal pain, diarrhea, nausea and vomiting.   Genitourinary:  Negative for difficulty urinating and dysuria.   Musculoskeletal:  Negative for joint swelling and myalgias.   Skin:  Negative for color change.   Neurological:  Negative for seizures and headaches.   Psychiatric/Behavioral: Negative.     All other systems reviewed and are negative.     Physical Exam:  /73 (BP Location: Left arm, Patient Position: Lying)   Pulse 61   Temp 97.4 °F (36.3 °C) (Oral)   Resp 16   Wt 111 kg (244 lb 7.8 oz)   SpO2 98%   BMI 39.46 kg/m²     Physical Exam  Vitals and nursing note reviewed.   Constitutional:       General: He is not in acute distress.     Appearance: Normal appearance. He is not toxic-appearing.   HENT:      Head: Normocephalic and atraumatic.      Jaw: There is normal jaw occlusion.   Eyes:      General: Lids are normal.      Extraocular Movements: Extraocular movements intact.      Conjunctiva/sclera: Conjunctivae normal.      Pupils: Pupils are equal, round, and reactive to light.   Cardiovascular:      Rate and Rhythm: Normal rate and regular rhythm.      Pulses: Normal pulses.      Heart sounds: Normal heart sounds.   Pulmonary:      Effort: Pulmonary effort is normal. No respiratory distress.      Breath sounds: Normal breath sounds. No wheezing or rhonchi.   Abdominal:      General: Abdomen is flat.      Palpations: Abdomen is soft.      Tenderness: There is no abdominal tenderness. There is no guarding or  rebound.   Musculoskeletal:         General: Normal range of motion.      Cervical back: Normal range of motion and neck supple.      Right lower leg: No edema.      Left lower leg: No edema.   Skin:     General: Skin is warm and dry.      Comments: Contusion central and right forehead   Neurological:      Mental Status: He is alert and oriented to person, place, and time. Mental status is at baseline.   Psychiatric:         Mood and Affect: Mood normal.                Procedures:  Procedures      Medical Decision Making:      Comorbidities that affect care:    Seizure disorder    External Notes reviewed:    Hospital Discharge Summary: After admission for substance abuse and breakthrough seizure activity.      The following orders were placed and all results were independently analyzed by me:  Orders Placed This Encounter   Procedures    [place] Draw    Comprehensive Metabolic Panel    CBC Auto Differential    NPO Diet NPO Type: Strict NPO    Continuous Pulse Oximetry    Vital Signs    Oxygen Therapy- Nasal Cannula; Titrate 1-6 LPM Per SpO2; 90 - 95%    POC Glucose Once    ECG 12 Lead Other; siezure    Insert Peripheral IV    Seizure Precautions    CBC & Differential    Green Top (Gel)    Lavender Top    Gold Top - SST    Light Blue Top       Medications Given in the Emergency Department:  Medications   sodium chloride 0.9 % flush 10 mL (has no administration in time range)        ED Course:         Labs:    Lab Results (last 24 hours)       Procedure Component Value Units Date/Time    CBC & Differential [[id number]]  (Abnormal) Collected: 07/25/23 1310    Specimen: Blood Updated: 07/25/23 1320    Narrative:      The following orders were created for panel order CBC & Differential.  Procedure                               Abnormality         Status                     ---------                               -----------         ------                     CBC Auto Differential[[id number]]        Abnormal             Final result                 Please view results for these tests on the individual orders.    Comprehensive Metabolic Panel [[id number]]  (Abnormal) Collected: 07/25/23 1310    Specimen: Blood Updated: 07/25/23 1400     Glucose 111 mg/dL      BUN 12 mg/dL      Creatinine 1.19 mg/dL      Sodium 137 mmol/L      Potassium 4.5 mmol/L      Chloride 103 mmol/L      CO2 20.8 mmol/L      Calcium 9.2 mg/dL      Total Protein 7.4 g/dL      Albumin 4.7 g/dL      ALT (SGPT) 27 U/L      AST (SGOT) 25 U/L      Alkaline Phosphatase 87 U/L      Total Bilirubin 0.2 mg/dL      Globulin 2.7 gm/dL      A/G Ratio 1.7 g/dL      BUN/Creatinine Ratio 10.1     Anion Gap 13.2 mmol/L      eGFR 86.4 mL/min/1.73     Narrative:      GFR Normal >60  Chronic Kidney Disease <60  Kidney Failure <15      CBC Auto Differential [[id number]]  (Abnormal) Collected: 07/25/23 1310    Specimen: Blood Updated: 07/25/23 1320     WBC 10.66 10*3/mm3      RBC 5.77 10*6/mm3      Hemoglobin 17.1 g/dL      Hematocrit 48.8 %      MCV 84.6 fL      MCH 29.6 pg      MCHC 35.0 g/dL      RDW 12.1 %      RDW-SD 37.0 fl      MPV 9.4 fL      Platelets 270 10*3/mm3      Neutrophil % 79.2 %      Lymphocyte % 11.0 %      Monocyte % 8.2 %      Eosinophil % 0.8 %      Basophil % 0.3 %      Immature Grans % 0.5 %      Neutrophils, Absolute 8.45 10*3/mm3      Lymphocytes, Absolute 1.17 10*3/mm3      Monocytes, Absolute 0.87 10*3/mm3      Eosinophils, Absolute 0.09 10*3/mm3      Basophils, Absolute 0.03 10*3/mm3      Immature Grans, Absolute 0.05 10*3/mm3      nRBC 0.0 /100 WBC              Imaging:    No Radiology Exams Resulted Within Past 24 Hours      Differential Diagnosis and Discussion:    Seizure: Differential diagnosis includes but is not limited to meningitis, hypoglycemia, electrolyte abnormalities, intracranial hemorrhage, toxin induced, and pseudoseizure.    All labs were reviewed and interpreted by me.    MDM  Number of Diagnoses or Management Options  Contusion of  forehead, initial encounter  Seizure  Diagnosis management comments: In summary this is a 27-year-old male patient who presents to the emergency department for evaluation status post seizure activity.  He has known seizure disorder.  Did hit his head but denies headache as result.  He is declining further work-up including a head CT at this time.  He is alert awake and oriented x4 and in no acute distress.  Very strict return to ER and follow-up instructions have been provided to the patient.               Patient Care Considerations:    CT HEAD: I considered ordering a noncontrast CT of the head, however patient declined      Consultants/Shared Management Plan:    None    Social Determinants of Health:    Patient is independent, reliable, and has access to care.       Disposition and Care Coordination:    Discharged: I considered escalation of care by admitting this patient for observation, however the patient has improved and is suitable and  stable for discharge.    I have explained the patient´s condition, diagnoses and treatment plan based on the information available to me at this time. I have answered questions and addressed any concerns. The patient has a good  understanding of the patient´s diagnosis, condition, and treatment plan as can be expected at this point. The vital signs have been stable. The patient´s condition is stable and appropriate for discharge from the emergency department.      The patient will pursue further outpatient evaluation with the primary care physician or other designated or consulting physician as outlined in the discharge instructions. They are agreeable to this plan of care and follow-up instructions have been explained in detail. The patient has received these instructions in written format and have expressed an understanding of the discharge instructions. The patient is aware that any significant change in condition or worsening of symptoms should prompt an immediate return  to this or the closest emergency department or call to 911.  I have explained discharge medications and the need for follow up with the patient/caretakers. This was also printed in the discharge instructions. Patient was discharged with the following medications and follow up:      Medication List      No changes were made to your prescriptions during this visit.      Provider, No Known  [facility]  [address] [id number]    In 1 week         Final diagnoses:   Seizure   Contusion of forehead, initial encounter        ED Disposition       ED Disposition   Discharge    Condition   Stable    Comment   --               This medical record created using voice recognition software.             Charan Oliveira MD  07/25/23 6147

## 2023-09-17 ENCOUNTER — HOSPITAL ENCOUNTER (INPATIENT)
Facility: HOSPITAL | Age: 28
LOS: 1 days | Discharge: HOME | DRG: 100 | End: 2023-09-18
Attending: EMERGENCY MEDICINE | Admitting: STUDENT IN AN ORGANIZED HEALTH CARE EDUCATION/TRAINING PROGRAM
Payer: COMMERCIAL

## 2023-09-17 ENCOUNTER — APPOINTMENT (OUTPATIENT)
Dept: CT IMAGING | Facility: HOSPITAL | Age: 28
DRG: 100 | End: 2023-09-17
Payer: COMMERCIAL

## 2023-09-17 ENCOUNTER — APPOINTMENT (OUTPATIENT)
Dept: GENERAL RADIOLOGY | Facility: HOSPITAL | Age: 28
DRG: 100 | End: 2023-09-17
Payer: COMMERCIAL

## 2023-09-17 DIAGNOSIS — G40.909 RECURRENT SEIZURES: Primary | ICD-10-CM

## 2023-09-17 DIAGNOSIS — J81.1 NONCARDIOGENIC PULMONARY EDEMA: ICD-10-CM

## 2023-09-17 DIAGNOSIS — E87.20 LACTIC ACIDOSIS: ICD-10-CM

## 2023-09-17 DIAGNOSIS — E87.20 METABOLIC ACIDOSIS: ICD-10-CM

## 2023-09-17 PROBLEM — R56.9 SEIZURE: Status: ACTIVE | Noted: 2023-09-17

## 2023-09-17 LAB
ALBUMIN SERPL-MCNC: 4.8 G/DL (ref 3.5–5.2)
ALBUMIN/GLOB SERPL: 1.7 G/DL
ALP SERPL-CCNC: 122 U/L (ref 39–117)
ALT SERPL W P-5'-P-CCNC: 35 U/L (ref 1–41)
AMPHET+METHAMPHET UR QL: NEGATIVE
ANION GAP SERPL CALCULATED.3IONS-SCNC: 12.4 MMOL/L (ref 5–15)
ANION GAP SERPL CALCULATED.3IONS-SCNC: 29 MMOL/L (ref 5–15)
APAP SERPL-MCNC: <5 MCG/ML (ref 0–30)
ARTERIAL PATENCY WRIST A: POSITIVE
AST SERPL-CCNC: 27 U/L (ref 1–40)
BARBITURATES UR QL SCN: NEGATIVE
BASE EXCESS BLDA CALC-SCNC: -20.6 MMOL/L (ref -2–2)
BASE EXCESS BLDV CALC-SCNC: -1.7 MMOL/L (ref -2–2)
BASOPHILS # BLD AUTO: 0.04 10*3/MM3 (ref 0–0.2)
BASOPHILS # BLD AUTO: 0.15 10*3/MM3 (ref 0–0.2)
BASOPHILS NFR BLD AUTO: 0.2 % (ref 0–1.5)
BASOPHILS NFR BLD AUTO: 0.5 % (ref 0–1.5)
BDY SITE: ABNORMAL
BDY SITE: ABNORMAL
BENZODIAZ UR QL SCN: POSITIVE
BILIRUB SERPL-MCNC: 0.2 MG/DL (ref 0–1.2)
BUN SERPL-MCNC: 12 MG/DL (ref 6–20)
BUN SERPL-MCNC: 17 MG/DL (ref 6–20)
BUN/CREAT SERPL: 12.1 (ref 7–25)
BUN/CREAT SERPL: 13.8 (ref 7–25)
CA-I BLDA-SCNC: 1.02 MMOL/L (ref 1.13–1.32)
CA-I BLDA-SCNC: 1.23 MMOL/L (ref 1.13–1.32)
CALCIUM SPEC-SCNC: 8.7 MG/DL (ref 8.6–10.5)
CALCIUM SPEC-SCNC: 9.2 MG/DL (ref 8.6–10.5)
CANNABINOIDS SERPL QL: POSITIVE
CHLORIDE BLDA-SCNC: 100 MMOL/L (ref 98–106)
CHLORIDE BLDV-SCNC: 102 MMOL/L (ref 98–106)
CHLORIDE SERPL-SCNC: 101 MMOL/L (ref 98–107)
CHLORIDE SERPL-SCNC: 95 MMOL/L (ref 98–107)
CK SERPL-CCNC: 168 U/L (ref 20–200)
CO2 SERPL-SCNC: 11 MMOL/L (ref 22–29)
CO2 SERPL-SCNC: 22.6 MMOL/L (ref 22–29)
COCAINE UR QL: NEGATIVE
COHGB MFR BLD: 1.4 % (ref 0–1.5)
COHGB MFR BLD: 4.2 % (ref 0–1.5)
CREAT SERPL-MCNC: 0.99 MG/DL (ref 0.76–1.27)
CREAT SERPL-MCNC: 1.23 MG/DL (ref 0.76–1.27)
D-LACTATE SERPL-SCNC: 14.5 MMOL/L (ref 0.5–2)
D-LACTATE SERPL-SCNC: 2 MMOL/L (ref 0.5–2)
D-LACTATE SERPL-SCNC: 2 MMOL/L (ref 0.5–2)
DEPRECATED RDW RBC AUTO: 40.5 FL (ref 37–54)
DEPRECATED RDW RBC AUTO: 42.5 FL (ref 37–54)
EGFRCR SERPLBLD CKD-EPI 2021: 107.7 ML/MIN/1.73
EGFRCR SERPLBLD CKD-EPI 2021: 83 ML/MIN/1.73
EOSINOPHIL # BLD AUTO: 0.01 10*3/MM3 (ref 0–0.4)
EOSINOPHIL # BLD AUTO: 0.11 10*3/MM3 (ref 0–0.4)
EOSINOPHIL NFR BLD AUTO: 0.1 % (ref 0.3–6.2)
EOSINOPHIL NFR BLD AUTO: 0.4 % (ref 0.3–6.2)
ERYTHROCYTE [DISTWIDTH] IN BLOOD BY AUTOMATED COUNT: 12.4 % (ref 12.3–15.4)
ERYTHROCYTE [DISTWIDTH] IN BLOOD BY AUTOMATED COUNT: 12.5 % (ref 12.3–15.4)
ETHANOL BLD-MCNC: <10 MG/DL (ref 0–10)
ETHANOL UR QL: <0.01 %
FENTANYL UR-MCNC: NEGATIVE NG/ML
FHHB: 18.1 % (ref 0–5)
FHHB: 8.4 % (ref 0–5)
FLUAV AG NPH QL: NEGATIVE
FLUBV AG NPH QL IA: NEGATIVE
GAS FLOW AIRWAY: 15 LPM
GEN 5 2HR TROPONIN T REFLEX: 8 NG/L
GLOBULIN UR ELPH-MCNC: 2.8 GM/DL
GLUCOSE BLDA-MCNC: 153 MG/DL (ref 70–99)
GLUCOSE BLDA-MCNC: 88 MG/DL (ref 70–99)
GLUCOSE BLDC GLUCOMTR-MCNC: 89 MG/DL (ref 70–99)
GLUCOSE SERPL-MCNC: 163 MG/DL (ref 65–99)
GLUCOSE SERPL-MCNC: 94 MG/DL (ref 65–99)
HCO3 BLDA-SCNC: 9.3 MMOL/L (ref 22–26)
HCO3 BLDV-SCNC: 23.3 MMOL/L (ref 22–26)
HCT VFR BLD AUTO: 45.5 % (ref 37.5–51)
HCT VFR BLD AUTO: 54.2 % (ref 37.5–51)
HGB BLD-MCNC: 14.9 G/DL (ref 13–17.7)
HGB BLD-MCNC: 17.8 G/DL (ref 13–17.7)
HGB BLDA-MCNC: 16.6 G/DL (ref 13.8–16.4)
HGB BLDA-MCNC: 18.5 G/DL (ref 13.8–16.4)
HOLD SPECIMEN: NORMAL
HOLD SPECIMEN: NORMAL
IMM GRANULOCYTES # BLD AUTO: 0.07 10*3/MM3 (ref 0–0.05)
IMM GRANULOCYTES # BLD AUTO: 0.26 10*3/MM3 (ref 0–0.05)
IMM GRANULOCYTES NFR BLD AUTO: 0.4 % (ref 0–0.5)
IMM GRANULOCYTES NFR BLD AUTO: 0.9 % (ref 0–0.5)
INHALED O2 CONCENTRATION: 100 %
INHALED O2 CONCENTRATION: 21 %
L PNEUMO1 AG UR QL IA: NEGATIVE
LACTATE BLDA-SCNC: 13.93 MMOL/L (ref 0.5–2)
LACTATE BLDA-SCNC: 3.56 MMOL/L (ref 0.5–2)
LYMPHOCYTES # BLD AUTO: 1.71 10*3/MM3 (ref 0.7–3.1)
LYMPHOCYTES # BLD AUTO: 4.48 10*3/MM3 (ref 0.7–3.1)
LYMPHOCYTES NFR BLD AUTO: 15 % (ref 19.6–45.3)
LYMPHOCYTES NFR BLD AUTO: 9.7 % (ref 19.6–45.3)
MAGNESIUM SERPL-MCNC: 2.4 MG/DL (ref 1.6–2.6)
MCH RBC QN AUTO: 29 PG (ref 26.6–33)
MCH RBC QN AUTO: 30.1 PG (ref 26.6–33)
MCHC RBC AUTO-ENTMCNC: 32.7 G/DL (ref 31.5–35.7)
MCHC RBC AUTO-ENTMCNC: 32.8 G/DL (ref 31.5–35.7)
MCV RBC AUTO: 88.7 FL (ref 79–97)
MCV RBC AUTO: 91.7 FL (ref 79–97)
METHADONE UR QL SCN: NEGATIVE
METHGB BLD QL: 0.2 % (ref 0–1.5)
METHGB BLD QL: 0.5 % (ref 0–1.5)
MODALITY: ABNORMAL
MODALITY: ABNORMAL
MONOCYTES # BLD AUTO: 1.48 10*3/MM3 (ref 0.1–0.9)
MONOCYTES # BLD AUTO: 2.52 10*3/MM3 (ref 0.1–0.9)
MONOCYTES NFR BLD AUTO: 8.4 % (ref 5–12)
MONOCYTES NFR BLD AUTO: 8.4 % (ref 5–12)
MRSA DNA SPEC QL NAA+PROBE: NORMAL
NEUTROPHILS NFR BLD AUTO: 14.33 10*3/MM3 (ref 1.7–7)
NEUTROPHILS NFR BLD AUTO: 22.36 10*3/MM3 (ref 1.7–7)
NEUTROPHILS NFR BLD AUTO: 74.8 % (ref 42.7–76)
NEUTROPHILS NFR BLD AUTO: 81.2 % (ref 42.7–76)
NRBC BLD AUTO-RTO: 0 /100 WBC (ref 0–0.2)
NRBC BLD AUTO-RTO: 0 /100 WBC (ref 0–0.2)
NT-PROBNP SERPL-MCNC: 225.2 PG/ML (ref 0–450)
OPIATES UR QL: NEGATIVE
OXYCODONE UR QL SCN: NEGATIVE
OXYHGB MFR BLDV: 77.5 % (ref 94–99)
OXYHGB MFR BLDV: 89.7 % (ref 94–99)
PCO2 BLDA: 35 MM HG (ref 35–45)
PCO2 BLDV: 40.8 MM HG (ref 41–51)
PH BLDA: 7.04 PH UNITS (ref 7.35–7.45)
PH BLDV: 7.38 PH UNITS (ref 7.31–7.41)
PHOSPHATE SERPL-MCNC: 3.3 MG/DL (ref 2.5–4.5)
PLATELET # BLD AUTO: 233 10*3/MM3 (ref 140–450)
PLATELET # BLD AUTO: 405 10*3/MM3 (ref 140–450)
PMV BLD AUTO: 9.3 FL (ref 6–12)
PMV BLD AUTO: 9.6 FL (ref 6–12)
PO2 BLD: 84 MM[HG] (ref 0–500)
PO2 BLDA: 83.8 MM HG (ref 80–100)
PO2 BLDV: 46.4 MM HG (ref 35–42)
POTASSIUM BLDA-SCNC: 4.98 MMOL/L (ref 3.5–5)
POTASSIUM BLDV-SCNC: 4.6 MMOL/L (ref 3.5–5)
POTASSIUM SERPL-SCNC: 4.7 MMOL/L (ref 3.5–5.2)
POTASSIUM SERPL-SCNC: 5.3 MMOL/L (ref 3.5–5.2)
PROCALCITONIN SERPL-MCNC: 0.04 NG/ML (ref 0–0.25)
PROT SERPL-MCNC: 7.6 G/DL (ref 6–8.5)
QT INTERVAL: 313 MS
QTC INTERVAL: 417 MS
RBC # BLD AUTO: 5.13 10*6/MM3 (ref 4.14–5.8)
RBC # BLD AUTO: 5.91 10*6/MM3 (ref 4.14–5.8)
S PNEUM AG SPEC QL LA: NEGATIVE
SALICYLATES SERPL-MCNC: 0.3 MG/DL
SAO2 % BLDCOA: 91.4 % (ref 95–99)
SAO2 % BLDCOV: 81.1 % (ref 45–75)
SARS-COV-2 RNA RESP QL NAA+PROBE: NOT DETECTED
SODIUM BLDA-SCNC: 135.8 MMOL/L (ref 136–146)
SODIUM BLDV-SCNC: 134.4 MMOL/L (ref 136–146)
SODIUM SERPL-SCNC: 135 MMOL/L (ref 136–145)
SODIUM SERPL-SCNC: 136 MMOL/L (ref 136–145)
TROPONIN T DELTA: -1 NG/L
TROPONIN T SERPL HS-MCNC: 9 NG/L
VALPROATE SERPL-MCNC: <2.8 MCG/ML (ref 50–125)
WBC NRBC COR # BLD: 17.64 10*3/MM3 (ref 3.4–10.8)
WBC NRBC COR # BLD: 29.88 10*3/MM3 (ref 3.4–10.8)
WHOLE BLOOD HOLD COAG: NORMAL
WHOLE BLOOD HOLD SPECIMEN: NORMAL

## 2023-09-17 PROCEDURE — 82948 REAGENT STRIP/BLOOD GLUCOSE: CPT

## 2023-09-17 PROCEDURE — 82077 ASSAY SPEC XCP UR&BREATH IA: CPT | Performed by: EMERGENCY MEDICINE

## 2023-09-17 PROCEDURE — 80307 DRUG TEST PRSMV CHEM ANLYZR: CPT | Performed by: EMERGENCY MEDICINE

## 2023-09-17 PROCEDURE — 94799 UNLISTED PULMONARY SVC/PX: CPT

## 2023-09-17 PROCEDURE — 82820 HEMOGLOBIN-OXYGEN AFFINITY: CPT | Performed by: STUDENT IN AN ORGANIZED HEALTH CARE EDUCATION/TRAINING PROGRAM

## 2023-09-17 PROCEDURE — 93005 ELECTROCARDIOGRAM TRACING: CPT | Performed by: EMERGENCY MEDICINE

## 2023-09-17 PROCEDURE — 84100 ASSAY OF PHOSPHORUS: CPT | Performed by: STUDENT IN AN ORGANIZED HEALTH CARE EDUCATION/TRAINING PROGRAM

## 2023-09-17 PROCEDURE — 82550 ASSAY OF CK (CPK): CPT | Performed by: EMERGENCY MEDICINE

## 2023-09-17 PROCEDURE — 83735 ASSAY OF MAGNESIUM: CPT | Performed by: STUDENT IN AN ORGANIZED HEALTH CARE EDUCATION/TRAINING PROGRAM

## 2023-09-17 PROCEDURE — 82375 ASSAY CARBOXYHB QUANT: CPT | Performed by: EMERGENCY MEDICINE

## 2023-09-17 PROCEDURE — 85025 COMPLETE CBC W/AUTO DIFF WBC: CPT | Performed by: EMERGENCY MEDICINE

## 2023-09-17 PROCEDURE — 93010 ELECTROCARDIOGRAM REPORT: CPT | Performed by: INTERNAL MEDICINE

## 2023-09-17 PROCEDURE — 25010000002 HALOPERIDOL LACTATE PER 5 MG: Performed by: EMERGENCY MEDICINE

## 2023-09-17 PROCEDURE — 25010000002 PIPERACILLIN SOD-TAZOBACTAM PER 1 G: Performed by: EMERGENCY MEDICINE

## 2023-09-17 PROCEDURE — 80179 DRUG ASSAY SALICYLATE: CPT | Performed by: EMERGENCY MEDICINE

## 2023-09-17 PROCEDURE — 87804 INFLUENZA ASSAY W/OPTIC: CPT | Performed by: EMERGENCY MEDICINE

## 2023-09-17 PROCEDURE — 71260 CT THORAX DX C+: CPT

## 2023-09-17 PROCEDURE — 36600 WITHDRAWAL OF ARTERIAL BLOOD: CPT

## 2023-09-17 PROCEDURE — 82805 BLOOD GASES W/O2 SATURATION: CPT | Performed by: STUDENT IN AN ORGANIZED HEALTH CARE EDUCATION/TRAINING PROGRAM

## 2023-09-17 PROCEDURE — 80053 COMPREHEN METABOLIC PANEL: CPT | Performed by: EMERGENCY MEDICINE

## 2023-09-17 PROCEDURE — 80143 DRUG ASSAY ACETAMINOPHEN: CPT | Performed by: EMERGENCY MEDICINE

## 2023-09-17 PROCEDURE — 25010000002 ENOXAPARIN PER 10 MG: Performed by: STUDENT IN AN ORGANIZED HEALTH CARE EDUCATION/TRAINING PROGRAM

## 2023-09-17 PROCEDURE — 87635 SARS-COV-2 COVID-19 AMP PRB: CPT | Performed by: EMERGENCY MEDICINE

## 2023-09-17 PROCEDURE — 0 LEVETIRACETAM IN NACL 0.54% 1500 MG/100ML SOLUTION: Performed by: STUDENT IN AN ORGANIZED HEALTH CARE EDUCATION/TRAINING PROGRAM

## 2023-09-17 PROCEDURE — 85025 COMPLETE CBC W/AUTO DIFF WBC: CPT | Performed by: STUDENT IN AN ORGANIZED HEALTH CARE EDUCATION/TRAINING PROGRAM

## 2023-09-17 PROCEDURE — 87040 BLOOD CULTURE FOR BACTERIA: CPT | Performed by: EMERGENCY MEDICINE

## 2023-09-17 PROCEDURE — 82805 BLOOD GASES W/O2 SATURATION: CPT | Performed by: EMERGENCY MEDICINE

## 2023-09-17 PROCEDURE — 83605 ASSAY OF LACTIC ACID: CPT | Performed by: EMERGENCY MEDICINE

## 2023-09-17 PROCEDURE — 84145 PROCALCITONIN (PCT): CPT | Performed by: STUDENT IN AN ORGANIZED HEALTH CARE EDUCATION/TRAINING PROGRAM

## 2023-09-17 PROCEDURE — 94761 N-INVAS EAR/PLS OXIMETRY MLT: CPT

## 2023-09-17 PROCEDURE — 80164 ASSAY DIPROPYLACETIC ACD TOT: CPT | Performed by: EMERGENCY MEDICINE

## 2023-09-17 PROCEDURE — 93005 ELECTROCARDIOGRAM TRACING: CPT | Performed by: STUDENT IN AN ORGANIZED HEALTH CARE EDUCATION/TRAINING PROGRAM

## 2023-09-17 PROCEDURE — 83050 HGB METHEMOGLOBIN QUAN: CPT | Performed by: EMERGENCY MEDICINE

## 2023-09-17 PROCEDURE — 25010000002 LORAZEPAM PER 2 MG: Performed by: EMERGENCY MEDICINE

## 2023-09-17 PROCEDURE — 51702 INSERT TEMP BLADDER CATH: CPT

## 2023-09-17 PROCEDURE — 25010000002 VANCOMYCIN 5 G RECONSTITUTED SOLUTION: Performed by: EMERGENCY MEDICINE

## 2023-09-17 PROCEDURE — 70450 CT HEAD/BRAIN W/O DYE: CPT

## 2023-09-17 PROCEDURE — 25010000002 PIPERACILLIN SOD-TAZOBACTAM PER 1 G: Performed by: STUDENT IN AN ORGANIZED HEALTH CARE EDUCATION/TRAINING PROGRAM

## 2023-09-17 PROCEDURE — 99291 CRITICAL CARE FIRST HOUR: CPT | Performed by: INTERNAL MEDICINE

## 2023-09-17 PROCEDURE — 84484 ASSAY OF TROPONIN QUANT: CPT | Performed by: STUDENT IN AN ORGANIZED HEALTH CARE EDUCATION/TRAINING PROGRAM

## 2023-09-17 PROCEDURE — 83605 ASSAY OF LACTIC ACID: CPT | Performed by: STUDENT IN AN ORGANIZED HEALTH CARE EDUCATION/TRAINING PROGRAM

## 2023-09-17 PROCEDURE — 71045 X-RAY EXAM CHEST 1 VIEW: CPT

## 2023-09-17 PROCEDURE — 80177 DRUG SCRN QUAN LEVETIRACETAM: CPT | Performed by: EMERGENCY MEDICINE

## 2023-09-17 PROCEDURE — 87899 AGENT NOS ASSAY W/OPTIC: CPT | Performed by: STUDENT IN AN ORGANIZED HEALTH CARE EDUCATION/TRAINING PROGRAM

## 2023-09-17 PROCEDURE — 25510000001 IOPAMIDOL PER 1 ML: Performed by: EMERGENCY MEDICINE

## 2023-09-17 PROCEDURE — 83880 ASSAY OF NATRIURETIC PEPTIDE: CPT | Performed by: EMERGENCY MEDICINE

## 2023-09-17 PROCEDURE — 36415 COLL VENOUS BLD VENIPUNCTURE: CPT | Performed by: STUDENT IN AN ORGANIZED HEALTH CARE EDUCATION/TRAINING PROGRAM

## 2023-09-17 PROCEDURE — 99285 EMERGENCY DEPT VISIT HI MDM: CPT

## 2023-09-17 PROCEDURE — 87641 MR-STAPH DNA AMP PROBE: CPT | Performed by: STUDENT IN AN ORGANIZED HEALTH CARE EDUCATION/TRAINING PROGRAM

## 2023-09-17 PROCEDURE — 87449 NOS EACH ORGANISM AG IA: CPT | Performed by: STUDENT IN AN ORGANIZED HEALTH CARE EDUCATION/TRAINING PROGRAM

## 2023-09-17 RX ORDER — BISACODYL 5 MG/1
5 TABLET, DELAYED RELEASE ORAL DAILY PRN
Status: DISCONTINUED | OUTPATIENT
Start: 2023-09-17 | End: 2023-09-18 | Stop reason: HOSPADM

## 2023-09-17 RX ORDER — KETAMINE HCL IN NACL, ISO-OSM 100MG/10ML
100 SYRINGE (ML) INJECTION ONCE
Status: DISCONTINUED | OUTPATIENT
Start: 2023-09-17 | End: 2023-09-17

## 2023-09-17 RX ORDER — OXCARBAZEPINE 300 MG/1
600 TABLET, FILM COATED ORAL EVERY 12 HOURS SCHEDULED
Status: DISCONTINUED | OUTPATIENT
Start: 2023-09-17 | End: 2023-09-18 | Stop reason: HOSPADM

## 2023-09-17 RX ORDER — HALOPERIDOL 5 MG/ML
5 INJECTION INTRAMUSCULAR ONCE
Status: COMPLETED | OUTPATIENT
Start: 2023-09-17 | End: 2023-09-17

## 2023-09-17 RX ORDER — AMOXICILLIN 250 MG
2 CAPSULE ORAL 2 TIMES DAILY
Status: DISCONTINUED | OUTPATIENT
Start: 2023-09-17 | End: 2023-09-18 | Stop reason: HOSPADM

## 2023-09-17 RX ORDER — SODIUM CHLORIDE 0.9 % (FLUSH) 0.9 %
10 SYRINGE (ML) INJECTION AS NEEDED
Status: DISCONTINUED | OUTPATIENT
Start: 2023-09-17 | End: 2023-09-18 | Stop reason: HOSPADM

## 2023-09-17 RX ORDER — ACETAMINOPHEN 650 MG/1
650 SUPPOSITORY RECTAL EVERY 6 HOURS PRN
Status: DISCONTINUED | OUTPATIENT
Start: 2023-09-17 | End: 2023-09-18 | Stop reason: HOSPADM

## 2023-09-17 RX ORDER — LORAZEPAM 2 MG/ML
1 INJECTION INTRAMUSCULAR ONCE
Status: COMPLETED | OUTPATIENT
Start: 2023-09-17 | End: 2023-09-17

## 2023-09-17 RX ORDER — POLYETHYLENE GLYCOL 3350 17 G/17G
17 POWDER, FOR SOLUTION ORAL DAILY PRN
Status: DISCONTINUED | OUTPATIENT
Start: 2023-09-17 | End: 2023-09-18 | Stop reason: HOSPADM

## 2023-09-17 RX ORDER — ENOXAPARIN SODIUM 100 MG/ML
40 INJECTION SUBCUTANEOUS EVERY 12 HOURS SCHEDULED
Status: DISCONTINUED | OUTPATIENT
Start: 2023-09-17 | End: 2023-09-18 | Stop reason: HOSPADM

## 2023-09-17 RX ORDER — OMEPRAZOLE 40 MG/1
40 CAPSULE, DELAYED RELEASE ORAL DAILY
COMMUNITY

## 2023-09-17 RX ORDER — LEVETIRACETAM 750 MG/1
1500 TABLET ORAL EVERY 12 HOURS SCHEDULED
Status: DISCONTINUED | OUTPATIENT
Start: 2023-09-18 | End: 2023-09-18 | Stop reason: HOSPADM

## 2023-09-17 RX ORDER — LORAZEPAM 2 MG/ML
2 INJECTION INTRAMUSCULAR EVERY 4 HOURS PRN
Status: DISCONTINUED | OUTPATIENT
Start: 2023-09-17 | End: 2023-09-18 | Stop reason: HOSPADM

## 2023-09-17 RX ORDER — ACETAMINOPHEN 325 MG/1
650 TABLET ORAL EVERY 6 HOURS PRN
Status: DISCONTINUED | OUTPATIENT
Start: 2023-09-17 | End: 2023-09-18 | Stop reason: HOSPADM

## 2023-09-17 RX ORDER — KETAMINE HYDROCHLORIDE 100 MG/ML
100 INJECTION INTRAMUSCULAR; INTRAVENOUS ONCE
Status: COMPLETED | OUTPATIENT
Start: 2023-09-17 | End: 2023-09-17

## 2023-09-17 RX ORDER — SODIUM CHLORIDE 9 MG/ML
40 INJECTION, SOLUTION INTRAVENOUS AS NEEDED
Status: DISCONTINUED | OUTPATIENT
Start: 2023-09-17 | End: 2023-09-18 | Stop reason: HOSPADM

## 2023-09-17 RX ORDER — LEVETIRACETAM 15 MG/ML
1500 INJECTION INTRAVASCULAR ONCE
Status: COMPLETED | OUTPATIENT
Start: 2023-09-17 | End: 2023-09-17

## 2023-09-17 RX ORDER — SODIUM CHLORIDE 0.9 % (FLUSH) 0.9 %
10 SYRINGE (ML) INJECTION EVERY 12 HOURS SCHEDULED
Status: DISCONTINUED | OUTPATIENT
Start: 2023-09-17 | End: 2023-09-18 | Stop reason: HOSPADM

## 2023-09-17 RX ORDER — BISACODYL 10 MG
10 SUPPOSITORY, RECTAL RECTAL DAILY PRN
Status: DISCONTINUED | OUTPATIENT
Start: 2023-09-17 | End: 2023-09-18 | Stop reason: HOSPADM

## 2023-09-17 RX ORDER — NITROGLYCERIN 0.4 MG/1
0.4 TABLET SUBLINGUAL
Status: DISCONTINUED | OUTPATIENT
Start: 2023-09-17 | End: 2023-09-18 | Stop reason: HOSPADM

## 2023-09-17 RX ADMIN — SODIUM CHLORIDE 1000 ML: 9 INJECTION, SOLUTION INTRAVENOUS at 11:13

## 2023-09-17 RX ADMIN — OXCARBAZEPINE 600 MG: 300 TABLET, FILM COATED ORAL at 17:49

## 2023-09-17 RX ADMIN — SODIUM CHLORIDE, POTASSIUM CHLORIDE, SODIUM LACTATE AND CALCIUM CHLORIDE 1000 ML: 600; 310; 30; 20 INJECTION, SOLUTION INTRAVENOUS at 16:08

## 2023-09-17 RX ADMIN — LORAZEPAM 1 MG: 2 INJECTION INTRAMUSCULAR; INTRAVENOUS at 11:11

## 2023-09-17 RX ADMIN — PIPERACILLIN AND TAZOBACTAM 3.38 G: 3; .375 INJECTION, POWDER, LYOPHILIZED, FOR SOLUTION INTRAVENOUS at 23:42

## 2023-09-17 RX ADMIN — PIPERACILLIN AND TAZOBACTAM 4.5 G: 4; .5 INJECTION, POWDER, FOR SOLUTION INTRAVENOUS at 16:09

## 2023-09-17 RX ADMIN — LORAZEPAM 1 MG: 2 INJECTION INTRAMUSCULAR; INTRAVENOUS at 10:20

## 2023-09-17 RX ADMIN — ENOXAPARIN SODIUM 40 MG: 100 INJECTION SUBCUTANEOUS at 21:07

## 2023-09-17 RX ADMIN — IOPAMIDOL 100 ML: 755 INJECTION, SOLUTION INTRAVENOUS at 11:00

## 2023-09-17 RX ADMIN — KETAMINE HYDROCHLORIDE 100 MG: 100 INJECTION, SOLUTION, CONCENTRATE INTRAMUSCULAR; INTRAVENOUS at 10:30

## 2023-09-17 RX ADMIN — Medication 10 ML: at 21:07

## 2023-09-17 RX ADMIN — LEVETIRACETAM 1500 MG: 15 INJECTION, SOLUTION INTRAVENOUS at 17:39

## 2023-09-17 RX ADMIN — VANCOMYCIN HYDROCHLORIDE 2250 MG: 500 INJECTION, POWDER, LYOPHILIZED, FOR SOLUTION INTRAVENOUS at 16:51

## 2023-09-17 RX ADMIN — HALOPERIDOL LACTATE 5 MG: 5 INJECTION, SOLUTION INTRAMUSCULAR at 10:06

## 2023-09-17 RX ADMIN — SODIUM BICARBONATE 50 MEQ: 84 INJECTION INTRAVENOUS at 09:52

## 2023-09-17 RX ADMIN — Medication 50 MEQ: at 09:52

## 2023-09-17 NOTE — ED NOTES
Pt is awake and alert at this time and wants the catheter taken out. It was removed. Pt also reports he does not want to be admitted to the hospital. Message to admitting provider sent.

## 2023-09-17 NOTE — CONSULTS
Pulmonary / Critical Care Consult Note      Patient Name: Fred Wood  : 1995  MRN: [id number]  Primary Care Physician:  Provider, No Known  Referring Physician: Pola De Leon DO  Date of admission: 2023    Subjective   Subjective     Reason for Consult/ Chief Complaint:   Seizures, altered mental status    HPI:  Fred Wood is a 27 y.o. male with a history of seizures came in seizing.  Was given Versed and became combative and was placed on pads.  Had lactic acid of 14 and hypoxemic respiratory failure.  Also had O2 saturations in the 80s.  Chest CT shows negative pressure pulmonary edema.  On my assessment he is postictal but occasionally agitated on arousal.  He is trying to pull out all lines and tubes and when he is awake he is threatening to leave AMA.  He is very lethargic and hypoxic.    Review of Systems  Cannot obtain as patient is postictal and agitated    Personal History     Past Medical History:   Diagnosis Date    Seizures        No past surgical history on file.    Family History: No pulmonary comorbidities noted in primary 5 members    Social History:  reports that he has been smoking cigarettes. He has been smoking an average of 1 pack per day. He does not have any smokeless tobacco history on file. He reports current alcohol use. He reports current drug use. Drugs: Methamphetamines and Marijuana.    Home Medications:  OXcarbazepine, levETIRAcetam, omeprazole, and propranolol    Allergies:  No Known Allergies    Objective    Objective     Vitals:   Temp:  [99.6 °F (37.6 °C)] 99.6 °F (37.6 °C)  Heart Rate:  [] 85  Resp:  [22-42] 26  BP: (141-166)/(81-97) 166/91    Physical Exam:  Vital Signs Reviewed   General:  WDWN, Alert, NAD.    HEENT:  PERRL, EOMI.  OP, nares clear  Neck:  Supple, no JVD, no thyromegaly  Chest:  good aeration, clear to auscultation bilaterally, tympanic to percussion bilaterally, no work of breathing noted  CV: RRR, no MGR, pulses 2+, equal.  Abd:  Soft,  NT, ND, + BS, no HSM  EXT:  no clubbing, no cyanosis, no edema  Neuro:  A&Ox0, postictal, lethargic but arousable with agitation, moves all extremities to pain, CN grossly intact, no focal deficits.  Skin: No rashes or lesions noted      Result Review    Result Review:  I have personally reviewed the results from the time of this admission to 9/17/2023 12:06 EDT and agree with these findings:  [x]  Laboratory  [x]  Microbiology  [x]  Radiology  [x]  EKG/Telemetry   [x]  Cardiology/Vascular   []  Pathology  []  Old records  []  Other:  Most notable findings include:   Lactic acid 14  Head CT negative  Chest CT has findings consistent with biapical lung infiltrates        Lab 09/17/23  0946 09/17/23  0944   WBC  --  29.88*   HEMOGLOBIN  --  17.8*   HEMATOCRIT  --  54.2*   PLATELETS  --  405   SODIUM  --  135*   SODIUM, ARTERIAL 135.8*  --    POTASSIUM  --  5.3*   CHLORIDE  --  95*   CO2  --  11.0*   BUN  --  17   CREATININE  --  1.23   GLUCOSE  --  163*   GLUCOSE, ARTERIAL 153*  --    CALCIUM  --  9.2   TOTAL PROTEIN  --  7.6   ALBUMIN  --  4.8   GLOBULIN  --  2.8         Assessment & Plan   Assessment / Plan     Active Hospital Problems:  Active Hospital Problems    Diagnosis     **Seizure      Impression:  Altered mental status  Toxic/metabolic encephalopathy  Seizure  Postictal state  Negative pressure pulmonary edema  Possible pneumonia for unspecified organism  Leukocytosis  Lactic acidosis, clinically significant  Hyperglycemia, stress-induced  Hyponatremia, clinically insignificant  Marijuana abuse possible sepsis, present on admission  Acute hypoxemic respiratory failure      Plan:  Admit to ICU  Patient appears to be postictal and has a profound lactic acidosis.  Likely due to seizures.  Could also be related to sepsis  Continue antiepileptics for now and continue sedatives as patient is psychotic at this time.  Agree with empiric antibiotics and panculture patient.  This is like related to negative pressure  pulmonary edema.  Low threshold to discontinue antibiotics in 24 to 48 hours if cultures remain negative  Trend renal panel and electrolytes  Trend lactic acid  N.p.o. for now  Wean O2 to keep SPO2 greater than 90%  May spot dose Lasix if oxygenation does not improve  Zoom home antiepileptics    DVT prophylaxis:  No DVT prophylaxis order currently exists.     Code Status and Medical Interventions:   Ordered at: 09/17/23 1145     Code Status (Patient has no pulse and is not breathing):    CPR (Attempt to Resuscitate)     Medical Interventions (Patient has pulse or is breathing):    Full Support      I would strongly advise this patient not to leave AMA.  Very high chance he will not last very long at home and I suspect he would come back either intubated or dead      The patient is critically ill in the ICU with drug overdose, status epilepticus, altered mental status, lactic acidosis, negative pressure pulmonary edema, acute hypoxemic respiratory failure. Multidisciplinary bedside critical care rounds were performed with nursing staff, respiratory therapy, pharmacy, nutritional services, social work. I have personally reviewed the chart, labs and any pertinent imaging available.  I have spent 35 minutes of critical care time, excluding procedures, in the care of this patient.    Electronically signed by Cory Glover MD, 09/17/23, 1:26 PM EDT.

## 2023-09-17 NOTE — PLAN OF CARE
Goal Outcome Evaluation:  Plan of Care Reviewed With: patient        Progress: improving  Outcome Evaluation: Pt is cautiously cooperative. Patient agreed to all tests ordered and medications ordered. Pt is resting in bed at this time. Pt does refuse to wear oxygen. Pt only has episode of desat to 89 while asleep. Will continue to monitor.      Karolina Santana RN

## 2023-09-17 NOTE — H&P
[facility]   HOSPITALIST HISTORY AND PHYSICAL  Date: 2023   Patient Name: Fred Wood  : 1995  MRN: [id number]  Primary Care Physician:  Provider, No Known  Date of admission: 2023    Subjective   Subjective     Chief Complaint:   Chief Complaint   Patient presents with    Seizures     SEIZURE         HPI:    Fred Wood is a 27 y.o. male-year-old male with significant of seizure, breakthrough seizures, amphetamine abuse, morbid obesity was brought in by EMS due to seizure.  Per report,  he had 2 seizures at home this morning, EMS was called and patient was altered.  He was witnessed to have a third seizure by EMS, was given 2.5 mg of Versed on the way to the ER.  Patient has been obtunded and been hypoxic, no new seizure noted in the ER. On arrival to ER he was noted to be agitated, received lorazepam 1 mg, followed by Haldol 5 mg and ketamine 100 mg.  He was placed in restraints.. No new witnessed seizure in the Er. Critical care and neurology was consulted. Please see previous significant note patient initially refused to stay.  Recalled to admit patient and he had agreed to stay in the hospital.    He is much more awake, no new seizure episode witnessed in the ER.  Reports feels well, denies any chest pain or shortness of breath, lightheadedness dizziness or syncopal episodes.  He reports he will cooperate with care.    Temperature on arrival was 99.6, tachycardia from 85 to 136 bpm, respiratory rate initially 42 improved 20.  Blood pressure 141/81.  Saturating 93% on nonrebreather mask.  Labs were significant for valproic acid less than 2.8, proBNP 225, , 75.3, ethanol level less than 10, Tylenol level less than 5, WBC of 29.8, hemoglobin of 17.8, platelet count 405, Sodium 135, potassium 5.3, bicarb of 11 BUN of 17 creatinine 1.23, glucose of 163, lactic of 14, pH of 7.0, PCO2 35, PO2 of 83, procal 0.04, normal pro-BNP,  UDS positive for benzos and THC ,Keppra level was  collected.  He also received NS bolus x1, sodium bicarb 50 mg once, LR bolus times once.  CT head was unremarkable for any acute events.  Chest x-ray was remarkable for bilateral upper lung airspace opacities.  CT chest was performed which revealed no evidence of PE, evidence of bilateral upper lung ground glass opacities.  COVID and flu swabs were collected.  Blood cultures were ordered      Personal History     Past Medical History:  Past Medical History:   Diagnosis Date    Seizures        Past Surgical History:  History reviewed. No pertinent surgical history.    Family History:   History reviewed. No pertinent family history.    Social History:   Social History     Socioeconomic History    Marital status:    Tobacco Use    Smoking status: Every Day     Packs/day: 1.00     Types: Cigarettes   Vaping Use    Vaping Use: Never used   Substance and Sexual Activity    Alcohol use: Yes    Drug use: Yes     Types: Methamphetamines, Marijuana     Comment: states last meth use was long ago    Sexual activity: Defer       Home Medications:  OXcarbazepine, levETIRAcetam, omeprazole, and propranolol    Allergies:  No Known Allergies    Review of Systems   All systems were reviewed and negative except for:     Objective   Objective     Vitals:   Temp:  [99.6 °F (37.6 °C)] 99.6 °F (37.6 °C)  Heart Rate:  [] 91  Resp:  [13-42] 13  BP: (141-166)/(74-97) 162/74  Flow (L/min):  [2] 2    Physical Exam    Constitutional: Awake, alert, no acute distress, able to speak in full sentences   Eyes: Pupils equal,  HENT: NCAT, mucous membranes dry   Neck: Supple, no thyromegaly, no lymphadenopathy, trachea midline   Respiratory: Clear to auscultation bilaterally, nonlabored respirations    Cardiovascular: RRR, no murmurs, rubs, or gallops, palpable pedal pulses bilaterally   Gastrointestinal: Positive bowel sounds, soft, nontender, nondistended   Musculoskeletal: No bilateral ankle edema, no clubbing or cyanosis to  extremities   Psychiatric: Appropriate affect, cooperative   Neurologic: Oriented x 3, follows all commands, moves all extremities.  Speech clear   Skin: No rashes     Result Review    Result Review:  I have personally reviewed the results from the time of this admission to 9/17/2023 17:49 EDT and agree with these findings:  [x]  Laboratory  []  Microbiology  [x]  Radiology  [x]  EKG/Telemetry   []  Cardiology/Vascular   []  Pathology  []  Old records  []  Other:      Assessment & Plan   Assessment / Plan     Assessment/Plan:   Impression  Seizure disorder with breakthrough seizure  Leukocytosis likely reactive  Acute hypoxic respiratory failure  Anion gap metabolic acidosis/lactic acidosis  Toxic metabolic encephalopathy  Bilateral upper lobe groundglass opacities likely noncardiogenic pulm edema in the setting of seizures  Morbid obesity  History of substance abuse , UDS positive for marijuana     Plan  Admit to ICU, appreciate ICU evaluation.    Trend vitals,  labs and lactic acid.   Obtain repeat CBC BMP mag, phos,  lactic acid, troponin.   Repeat EKG given electrolyte abnormalities, echo for pulm edema. (proBNP normal)  Obtain repeat VBG  We will give 1 dose of IV Keppra.  Start PO post.   Pending neurology consult.   Resume home Keppra 1500 mg twice daily.  Resume other home anti- seizure medication once results reconciled  Ativan prn for breakthrough seizure  Consider EEG  Repeat CXR tomorrow AM  Oxygen to keep spo2>92%, if worsening will consider dose of IV lasix  Follow neurology recommendations, requested consult, pending  Continue vancomycin and Zosyn , probably could follow-up in the next 48 hours if cultures show no growth, procal remains low  Clinical course to dictate further management.   D/w ER and ICU team.         DVT prophylaxis:  Medical DVT prophylaxis orders are present.    CODE STATUS:    Code Status (Patient has no pulse and is not breathing): CPR (Attempt to Resuscitate)  Medical  Interventions (Patient has pulse or is breathing): Full Support      Admission Status:  I believe this patient meets Inpateint status.    Electronically signed by Kathy Ely MD, 09/17/23, 4:09 PM EDT.

## 2023-09-17 NOTE — PROGRESS NOTES
"[facility] Clinical Pharmacy Services: Vancomycin Pharmacokinetic Initial Consult Note    Fred Wood is a 27 y.o. male who is on day 1 of pharmacy to dose vancomycin for Pneumonia.    Consulting Provider: Dr. Ely  Planned Duration of Therapy: 7 days  Was Patient Receiving Prior to Admission/Consult?: No  Loading Dose Ordered or Given: 2250 mg on  at 1700  PK/PD Target: -600 mg/L.hr   Imaging Reviewed?: Yes  Other Antimicrobials: Piperacillin/Tazobactam    Microbiology Data  MRSA PCR performed: 23; Result: Pending  Culture/Source:    blood cultures x 2: in process   MRSA nasal swab: ordered    Vitals/Labs  Ht: 167.6 cm (66\"); Wt: 111 kg (243 lb 13.3 oz)  Temp (24hrs), Av.6 °F (37.6 °C), Min:99.6 °F (37.6 °C), Max:99.6 °F (37.6 °C)   Estimated Creatinine Clearance: 106.5 mL/min (by C-G formula based on SCr of 1.23 mg/dL).     Results from last 7 days   Lab Units 23  0944   CREATININE mg/dL 1.23   WBC 10*3/mm3 29.88*     Assessment/Plan:    Vancomycin Dose:  1250 mg IV every 12 hours; which provides the following predicted parameters:  AUC24,ss: 482 mg/L.hr  PAUC*: 69 %  Ctrough,ss: 15.3 mg/L  Pconc*: 28 %  Tox.: 11 %  Vanc Trough planned prior to the 3rd or 4th dose or as clinically indicated  Patient has order for Basic Metabolic Panel    Pharmacy will follow patient's kidney function and will adjust doses and obtain levels as necessary. Thank you for involving pharmacy in this patient's care. Please contact pharmacy with any questions or concerns.                           Miryam Schmid  Clinical Pharmacist    "

## 2023-09-17 NOTE — ED NOTES
2171 and 1289 entered chart for . Pt still refusing monitoring and requesting to be sent home. Wife contacted.  She stated that she cannot come get him due to transportation.  She stated that his brother would have to come get him but she had not been able to get ahold of him. I attempted to call the brother based off of the number on the chart and the line was disconnected.  I attempted to call the wife again and it went to voicemail.

## 2023-09-17 NOTE — ED PROVIDER NOTES
Time: 9:53 AM EDT  Date of encounter:  9/17/2023  Independent Historian/Clinical History and Information was obtained by:   EMS  Chief Complaint: Seizure    History is limited by: Altered Mental Status    History of Present Illness:  Patient is a 27 y.o. year old male who presents to the emergency department for evaluation of seizure.  EMS was called to patient's residence.  Patient's mother reports that patient had 2 seizures at home this morning.  Patient was altered.  Patient was witnessed to have a third seizure by EMS.  EMS administered 2.5 mg of Versed.  Seizure discontinued after that.  EMS reports patient has been obtunded and has been hypoxic.  Information is otherwise limited.    HPI    Patient Care Team  Primary Care Provider: Provider, Liliam Known    Past Medical History:     No Known Allergies  Past Medical History:   Diagnosis Date    Seizures      History reviewed. No pertinent surgical history.  History reviewed. No pertinent family history.    Home Medications:  Prior to Admission medications    Medication Sig Start Date End Date Taking? Authorizing Provider   diclofenac (VOLTAREN) 50 MG EC tablet Take 50 mg by mouth 2 (Two) Times a Day As Needed.    Paulino Cardoza MD   divalproex (DEPAKOTE ER) 500 MG 24 hr tablet Take 2 tablets by mouth Daily for 30 days. 9/3/22 10/3/22  Bernard Martinez DO   divalproex (DEPAKOTE) 500 MG DR tablet Take 1 tablet by mouth 2 (Two) Times a Day. 3/11/23   Mihir Carpenter DO   levETIRAcetam (KEPPRA) 750 MG tablet Take 2 tablets by mouth 2 (Two) Times a Day for 30 days. 9/2/22 10/2/22  Bernard Martinez DO   levETIRAcetam (KEPPRA) 750 MG tablet Take 2 tablets by mouth 2 (Two) Times a Day. 3/11/23   Mihir Carpenter DO   OXcarbazepine (TRILEPTAL) 600 MG tablet Take 600 mg by mouth 2 (Two) Times a Day.    Paulino Cardoza MD   propranolol (INDERAL) 40 MG tablet Take 40 mg by mouth 2 (Two) Times a Day.    Paulino Cardoza MD        Social History:   Social History  "    Tobacco Use    Smoking status: Every Day     Packs/day: 1.00     Types: Cigarettes   Vaping Use    Vaping Use: Never used   Substance Use Topics    Alcohol use: Yes    Drug use: Yes     Types: Methamphetamines, Marijuana     Comment: states last meth use was long ago         Review of Systems:  Review of Systems   Unable to perform ROS: Mental status change   All other systems reviewed and are negative.    Physical Exam:  /73   Pulse 63   Temp 98.8 °F (37.1 °C) (Oral)   Resp 20   Ht 167.6 cm (66\")   Wt 111 kg (243 lb 13.3 oz)   SpO2 95%   BMI 39.35 kg/m²     Physical Exam      Vital signs were reviewed under triage note.  General appearance - Patient appears well-developed and well-nourished.  Patient is obtunded.  Head - Normocephalic, atraumatic.  Pupils - Equal, round, reactive to light.  Extraocular muscles are intact.  Conjunctiva is clear.  Nasal - Normal inspection.  No evidence of trauma or epistaxis.  Tympanic membranes - Gray, intact without erythema or retractions.  Oral mucosa - Pink and moist without lesions or erythema.  Uvula is midline.  Chest wall - Atraumatic.  Chest wall is nontender.  There are no vesicular rashes noted.  Neck - Supple.  Trachea was midline.  There is no palpable lymphadenopathy or thyromegaly.  There are no meningeal signs  Lungs - Clear to auscultation and percussion bilaterally.  Heart - Regular rate and rhythm without any murmurs, clicks, or gallops.  Abdomen - Soft.  Bowel sounds are present.  There is no palpable tenderness.  There is no rebound, guarding, or rigidity.  There are no palpable masses.  There are no pulsatile masses.  Back - Spine is straight and midline.  There is no CVA tenderness.  Extremities - Intact x4 with full range of motion.  There is no palpable edema.  Pulses are intact x4 and equal.  Neurologic - Patient is obtunded but responds to some tactile stimuli.  Patient appears to be postictal.  Pupils are pinpoint in nature.  Patient " moves all 4 extremities.   Integument - There are no rashes.  There are no petechia or purpura lesions noted.  There are no vesicular lesions noted.      Procedures:  Procedures      Medical Decision Making:      Comorbidities that affect care:    Seizures    External Notes reviewed:    EMS Run sheet: EMS run sheet was reviewed by me.  Patient had a witnessed seizure by EMS and was given 2.5 mg of Versed.  Patient's seizure was full body, tonic-clonic in nature.  Seizure ceased with the Versed administration.      The following orders were placed and all results were independently analyzed by me:  Orders Placed This Encounter   Procedures    Blood Culture - Blood,    Blood Culture - Blood,    COVID-19,CEPHEID/FAVIOLA,COR/KATIA/PAD/WILTON/MAD IN-HOUSE(OR EMERGENT/ADD-ON),NP SWAB IN TRANSPORT MEDIA 3-4 HR TAT, RT-PCR - Swab, Nasopharynx    Influenza Antigen, Rapid - Swab, Nasopharynx    Legionella Antigen, Urine - Urine, Urine, Clean Catch    S. Pneumo Ag Urine or CSF - Urine, Urine, Clean Catch    MRSA Screen, PCR (Inpatient) - Swab, Nares    CT Head Without Contrast    XR Chest 1 View    CT Chest With Contrast Diagnostic    XR Chest 1 View    [place] Draw    Comprehensive Metabolic Panel    CBC Auto Differential    ABG with Co-Ox and Electrolytes    Acetaminophen Level    Ethanol    Urine Drug Screen - Urine, Clean Catch    Salicylate Level    CK    Lactic Acid, Plasma    Valproic Acid Level, Total    STAT Lactic Acid, Reflex    BNP    Procalcitonin    Basic Metabolic Panel    Magnesium    Phosphorus    VBG with Co-Ox and Electrolytes    CBC Auto Differential    High Sensitivity Troponin T    Lactic Acid, Plasma    High Sensitivity Troponin T 2Hr    CBC Auto Differential    Vital Signs    Insert Indwelling Urinary Catheter    Rectal Temperature    Height & Weight    Nursing Dysphagia Screening (Complete Prior to Giving Anything By Mouth)    Pulmonology (on-call MD unless specified)    POC Glucose Once    POC Glucose Once     POC Glucose Once    POC Glucose Once    ECG 12 Lead Other; SEIZURE    ECG 12 Lead Electrolyte Imbalance    Adult Transthoracic Echo Complete W/ Cont if Necessary Per Protocol    Inpatient Admission    CBC & Differential    Green Top (Gel)    Lavender Top    Gold Top - SST    Light Blue Top    CBC & Differential       Medications Given in the Emergency Department:  Medications   haloperidol lactate (HALDOL) injection 5 mg (5 mg Intravenous Given 9/17/23 1006)   sodium bicarbonate injection 8.4% 50 mEq (50 mEq Intravenous Given 9/17/23 0952)   iopamidol (ISOVUE-370) 76 % injection 100 mL (100 mL Intravenous Given 9/17/23 1100)   LORazepam (ATIVAN) injection 1 mg (1 mg Intravenous Given 9/17/23 1111)   sodium chloride 0.9 % bolus 1,000 mL (0 mL Intravenous Stopped 9/17/23 1143)   piperacillin-tazobactam (ZOSYN) 4.5 g/100 mL 0.9% NS IVPB (mbp) (4.5 g Intravenous New Bag 9/17/23 1609)   vancomycin 2250 mg/500 mL 0.9% NS IVPB ([facility]) (2,250 mg Intravenous New Bag 9/17/23 1651)   lactated ringers bolus 1,000 mL (1,000 mL Intravenous New Bag 9/17/23 1608)   LORazepam (ATIVAN) injection 1 mg (1 mg Intravenous Given 9/17/23 1020)   ketamine (KETALAR) injection 100 mg (100 mg Intravenous Given 9/17/23 1030)   levETIRAcetam in NaCl 0.54% (KEPPRA) IVPB 1,500 mg (1,500 mg Intravenous New Bag 9/17/23 1739)        ED Course:    The patient was seen upon arrival to the ED.  Patient was taken into trauma bay #1.  Patient was placed on high flow oxygen due to some hypoxia.  Patient had intact gag reflex.  I have access was established.  Cardiac monitor was suction was available.  Blood sugar was acceptable.  Initial work-up was initiated.  Shortly after arrival the patient became combative but was not oriented.-year-old Moreman by EMS from the patient's family that this is typical after his seizures.  Patient was initially placed in soft restraints and ultimately had to be placed in mother taxed.  Patient's combativeness affected our  ability to quickly evaluate the patient.  Reluctantly patient was initially given Ativan and then Haldol without significant improvement.  Ultimately patient was given ketamine.  This allowed us to the patient a CT scan obtained good pictures to ensure there is no acute intracranial injury.  Additionally the CT of the chest rule out pulm embolism or other pathology.  Once the ketamine wore off the patient was awake and cooperative.  I discussed the patient's labs with the patient explained that he appears to be in significant lactic acidosis most likely from his seizures and requires hospitalization.  Additionally he also appears to have some noncardiogenic pulmonary edema.  Initially patient wanted to leave AMA was ultimately seen by the hospitalist and the intensivist.  After some time the patient change his mind and agreed to be admitted.    Labs:    Lab Results (last 24 hours)       ** No results found for the last 24 hours. **             Imaging:    No Radiology Exams Resulted Within Past 24 Hours      Differential Diagnosis and Discussion:    Altered Mental Status: Based on the patient's signs and symptoms, differential diagnosis includes but is not limited to meningitis, stroke, sepsis, subarachnoid hemorrhage, intracranial bleeding, encephalitis, and metabolic encephalopathy.    All labs were reviewed and interpreted by me.  All X-rays impressions were independently interpreted by me.  EKG was interpreted by me.  CT scan radiology impression was interpreted by me.    [facility]       Critical Care Note: Total Critical Care time of 48 minutes. Total critical care time documented does not include time spent on separately billed procedures for services of nurses or physician assistants. I personally saw and examined the patient. I have reviewed all diagnostic interpretations and treatment plans as written. I was present for the key portions of any procedures performed and the inclusive time noted in any critical care  statement. Critical care time includes patient management by me, time spent at the patients bedside,  time to review lab and imaging results, discussing patient care, documentation in the medical record, and time spent with family or caregiver.    Patient Care Considerations:    Lumbar puncture was considered due to altered mental status however due to the patient's history of seizures and postictal combativeness I felt this could be delayed for further monitoring to see if his altered mental status resolved with time.      Consultants/Shared Management Plan:    Hospitalist: I have discussed the case with hospitalist who agrees to accept the patient for admission.  Consultant: I have discussed the case with Dr. Glover who agrees to consult on the patient.    Social Determinants of Health:    Patient is independent, reliable, and has access to care.       Disposition and Care Coordination:    Admit:   Through independent evaluation of the patient's history, physical, and imperical data, the patient meets criteria for observation/admission to the hospital.        Final diagnoses:   Recurrent seizures   Metabolic acidosis   Noncardiogenic pulmonary edema   Lactic acidosis        ED Disposition       ED Disposition   Decision to Admit    Condition   --    Comment   Level of Care: Critical Care [6]   Diagnosis: Seizure [[id number]]   Admitting Physician: RANDAL FIGUEROA [[id number]]   Certification: I Certify That Inpatient Hospital Services Are Medically Necessary For Greater Than 2 Midnights                 This medical record created using voice recognition software.             Pola De Leon DO  09/19/23 [id number]

## 2023-09-17 NOTE — ED NOTES
Pt still refuses admission and refuses to wear any monitioring. When he is awake his 02 sat is hovering around 91-92% pt was moved to different room as he is awaiting his ride, I have managed to him letting me leave his Iv's in until his family gets here. Report to [facility]

## 2023-09-17 NOTE — ED NOTES
Pt has removed all monitoring at this time and doesn't want to keep them on he states he is leaving.

## 2023-09-17 NOTE — PAYOR COMM NOTE
"Donald Wood (27 y.o. Male)     INPATIENT AUTH REQUEST & INITIAL CLINICALS     PATIENT INFORMATION  Name:  Donald Wood  MRN#:     [id number]  :  1995       ADMISSION INFORMATION  CLASS: Inpatient   DOS:  2023      CURRENT ATTENDING PROVIDER INFORMATION  Name/NPI: Kathy Ely MD [id number]  Phone:  Phone: [phone]      RENDERING FACILITY  Name:  [facility]   NPI:  [id number]  TID:  [id number]  Address:      [address] [place], [place] [address] [address]  Phone  [phone]      CASE MANAGEMENT CONTACT INFORMATION  Phone:      [phone]  Fax:           [phone]              Date of Birth   1995    Social Security Number       Address   [address] [place] [address]  [address] [facility] [address]    Home Phone   [phone]    MRN   [id number]       Faith   None    Marital Status                               Admission Date   23    Admission Type   Emergency    Admitting Provider   Kathy Ely MD    Attending Provider   Kathy Ely MD    Department, Room/Bed   [facility] CORONARY CARE UNIT, C07/1       Discharge Date       Discharge Disposition       Discharge Destination                                 Attending Provider: Kathy Ely MD    Allergies: No Known Allergies    Isolation: None   Infection: COVID (rule out) (23)   Code Status: CPR    Ht: 167.6 cm (66\")   Wt: 111 kg (243 lb 13.3 oz)    Admission Cmt: None   Principal Problem: Seizure [R56.9]                 Active Insurance as of 2023       Primary Coverage       Payor Plan Insurance Group Employer/Plan Group    [facility] BY [facility] [id number]       Payor Plan Address Payor Plan Phone Number Payor Plan Fax Number Effective Dates    [address] [address]   2021 - None Entered    [facility] [address]         Subscriber Name Subscriber Birth Date Member ID       DONALD WOOD 1995 [id number]          "          Seizure [facility] Inpatient Care by Fariba Hernandez RN       Indications Met: Reviewed on 9/17/2023 by Fariba Hernandez RN       Created Using Review Status Review Entered   [facility] ER for Case Management Primary Completed 9/17/2023 1710       Created By   Fariba Hernandez RN       Criteria Set Name - Subset   Seizure [facility] Inpatient Care      Criteria Review   Seizure [facility] Inpatient Care     Overall Determination: Indications Met     Criteria:  [×] Admission is indicated for  1 or more  of the following :      [×] Status epilepticus [B]          9/17/2023  5:10 PM              -- 9/17/2023  5:10 PM by Fariba Hernandez RN --                  2 witnessed seizures back to back at home. Family called EMS. Post ictal/decreased responsiveness on EMS arrival then 3rd seizure witnessed by EMS en route to ED.      [×] Altered mental status that is severe or persistent          9/17/2023  5:10 PM              -- 9/17/2023  5:10 PM by Fariba Hernandez RN --                                    (X) Altered mental status (ie, different from baseline) that is severe or persistent, as indicated by  1 or more  of the following  (1) (2) (3) (4):                  (X) Lethargy (awake or arousable, but with drowsiness; reduced awareness of self and environment) that persists (eg, for more than few hours) despite appropriate treatment (eg, of underlying cause)                  (X) Obtundation (ie, arousable only with strong stimuli, lessened interest in environment, slowed responses to stimulation)          9/17/2023  5:10 PM              -- 9/17/2023  5:10 PM by Fariba Hernandez RN --                  AMS in ED. Initially Lethargic/obtunded in ED then became combative requiring restraints. Remains agitated, confused     Notes:  -- 9/17/2023  5:10 PM by Fariba Hernandez RN --      To ED by EMS for seizures. Had 2 back-to-back seizures at home, witnessed by family who called EMS. Pt post-ictal on EMS arrival then had seizure in ambulance en route to  ED. Once in ED, became agitated & combative requiring restraints to prevent self injury. Speech incomprehensible but does follow commands. Opens eyes to verbal stimulus. Sats 88% but dropped to low 80s intermittently.             PMHx: epilepsy; Substance abuse.             ED results: ABGs: pH 7.04; Bicarb 9.3; Sats 91% on 100% NRB. Lactate 14.5; Co2 11.0; Anion gap 29.0; WBC 29.88; H/H 17.8/54.2. UDS: + Benzos; + THC; CXR: Pulmonary edema vs PNA. CT head neg; EKG: . Valproic acid: <2.8.             Per EMS: Versed IV.            Per ED: Ketamine 100mg IV; Ativan 1mg IV x2; Haldol 5gm IV; IV LR 1000ml bolus; IV NS 1000ml bolus; Zosyn 4.5gm IV; NaHCO3 50IV. O2 100% NRB.             Admit: Critical care. Pulmonary & Neuro consult; Seizure precautions; Guo cath; O2-titrate; Repeat labs this afternoon; Keppra 1.5gm IVx1 then PO q12h; Vanc 2.25gm IV x1 then 1.25gm IV bid; Zosyn IV q8h.                                  History & Physical    No notes of this type exist for this encounter.          Emergency Department Notes        Courtney Levine, RN at 09/17/23 1539          Spoke to pt, pt willing to be admitted     Electronically signed by Courtney Levine RN at 09/17/23 1539       La Jon RN at 09/17/23 5319          Pt still refusing monitoring and requesting to be sent home. Wife contacted.  She stated that she cannot come get him due to transportation.  She stated that his brother would have to come get him but she had not been able to get ahold of him. I attempted to call the brother based off of the number on the chart and the line was disconnected.  I attempted to call the wife again and it went to voicemail.      Electronically signed by La Jon RN at 09/17/23 1423       Becky Toledo RN at 09/17/23 1340          Pt still refuses admission and refuses to wear any monitioring. When he is awake his 02 sat is hovering around 91-92% pt was moved to different room as he is awaiting his  agata, I have managed to him letting me leave his Iv's in until his family gets here. Report to La GRAF    Electronically signed by Becky Toledo RN at 09/17/23 1342       Becky Toledo RN at 09/17/23 1303          Pt has removed all monitoring at this time and doesn't want to keep them on he states he is leaving.     Electronically signed by Becky Toledo RN at 09/17/23 1303       Becky Toledo RN at 09/17/23 1256          Pt given a coke to drink he is sitting up talking and his 02 sat keeps dipping down but he refuses to wear his 02 he states he is going home and no one is going to talk him into staying.     Electronically signed by Becky Toledo RN at 09/17/23 1257       Becky Toledo RN at 09/17/23 1238          Admitting providers in to talk with pt and he still states he wants to leave. We are awaiting his wife/family to arrive    Electronically signed by Becky Toledo RN at 09/17/23 1239       Becky Toledo RN at 09/17/23 1212          Wife called and advises she will come up to get pt    Electronically signed by Becky Toledo RN at 09/17/23 1212       Becky Toledo RN at 09/17/23 1145          Security here to remove tats as pt is cooperative at this time.     Electronically signed by Becky Toledo RN at 09/17/23 1315       Becky Toledo RN at 09/17/23 1142          Pt is awake and alert at this time and wants the catheter taken out. It was removed. Pt also reports he does not want to be admitted to the hospital. Message to admitting provider sent.     Electronically signed by Becky Toledo RN at 09/17/23 1315       Becky Toledo RN at 09/17/23 1133          Non rebreather mask was removed due to pt waking up and taking deeper respirations.     Electronically signed by Becky Toledo RN at 09/17/23 1134       Becky Toledo RN at 09/17/23 1132          Pt waking up and admitting provider is in speaking with pt.     Electronically signed by Becky Toledo RN at 09/17/23 1133       Becky Toledo  LESIA MAHARAJ at 09/17/23 1100          Tats were removed during CT due to pt sleeping, tats reapplied by security once pt got off the CT table due to the episodic behavior and for staff safety.      Electronically signed by Becky Toledo RN at 09/17/23 1105       Becky Toledo RN at 09/17/23 1045          Pt is  calmer at this time and was taken to CT via stretcher  with staff and security.    Electronically signed by Becky Toledo RN at 09/17/23 1555       Becky Toledo RN at 09/17/23 1032          Pt placed on NRB at 100% at this time.     Electronically signed by Becky Toledo RN at 09/17/23 1106       Becky Toledo RN at 09/17/23 1030          Pt is still very combative and cursing fighting and swinging at staff pt had pulled off his soft  restraints  breaking one while in CT . Security was called to placed Tats on Dr. De Leon gave verbal order for ketamine 100 mg IVP at this time, it was given by LESIA Canchola  Security here for the tat placement also.     Electronically signed by Becky Toledo RN at 09/17/23 1556       Becky Toledo RN at 09/17/23 1020          1 mg of ativan was given IVP by LESIA Canchola    Electronically signed by Becky Toledo RN at 09/17/23 1059       Becky Toledo RN at 09/17/23 1015          Assuming care of pt , pt is currently in CT at this time. Pt is screaming and combative and pulling his restraints off Dr. De Leon notified and ordered ativan 1 mg IVP x1 at this time.     Electronically signed by Becky Toledo RN at 09/17/23 1037       Oxygen Therapy (last day)       Date/Time SpO2 Device (Oxygen Therapy) Flow (L/min) Oxygen Concentration (%) ETCO2 (mmHg)    09/17/23 16:22:10 96 -- 2 -- --    09/17/23 1335 91 room air -- -- --    09/17/23 1300 88 -- -- -- --    09/17/23 1145 92 -- -- -- --    09/17/23 1000 93 -- -- -- --    09/17/23 0943  nonrebreather mask -- -- --          Lab Results (last 24 hours)       Procedure Component Value Units Date/Time    MRSA Screen, PCR (Inpatient) - Swab,  Nares [[id number]] Collected: 09/17/23 1657    Specimen: Swab from Nares Updated: 09/17/23 1700    Legionella Antigen, Urine - Urine, Urine, Clean Catch [[id number]]  (Normal) Collected: 09/17/23 1614    Specimen: Urine, Clean Catch Updated: 09/17/23 1659     LEGIONELLA ANTIGEN, URINE Negative    S. Pneumo Ag Urine or CSF - Urine, Urine, Clean Catch [[id number]]  (Normal) Collected: 09/17/23 1614    Specimen: Urine, Clean Catch Updated: 09/17/23 1659     Strep Pneumo Ag Negative    Influenza Antigen, Rapid - Swab, Nasopharynx [[id number]]  (Normal) Collected: 09/17/23 1610    Specimen: Swab from Nasopharynx Updated: 09/17/23 1652     Influenza A Ag, EIA Negative     Influenza B Ag, EIA Negative    STAT Lactic Acid, Reflex [[id number]]  (Normal) Collected: 09/17/23 1614    Specimen: Blood Updated: 09/17/23 1645     Lactate 2.0 mmol/L     COVID-19,CEPHEID/FAVIOLA,COR/KATIA/PAD/WILTON/MAD IN-HOUSE(OR EMERGENT/ADD-ON),NP SWAB IN TRANSPORT MEDIA 3-4 HR TAT, RT-PCR - Swab, Nasopharynx [[id number]] Collected: 09/17/23 1610    Specimen: Swab from Nasopharynx Updated: 09/17/23 1622    Blood Culture - Blood, Arm, Right [[id number]] Collected: 09/17/23 1149    Specimen: Blood from Arm, Right Updated: 09/17/23 1355    Blood Culture - Blood, Arm, Left [[id number]] Collected: 09/17/23 1149    Specimen: Blood from Arm, Left Updated: 09/17/23 1355    Procalcitonin [[id number]]  (Normal) Collected: 09/17/23 0944    Specimen: Blood Updated: 09/17/23 1238     Procalcitonin 0.04 ng/mL     Narrative:      As a Marker for Sepsis (Non-Neonates):    1. <0.5 ng/mL represents a low risk of severe sepsis and/or septic shock.  2. >2 ng/mL represents a high risk of severe sepsis and/or septic shock.    As a Marker for Lower Respiratory Tract Infections that require antibiotic therapy:    PCT on Admission    Antibiotic Therapy       6-12 Hrs later    >0.5                Strongly Recommended  >0.25 - <0.5        Recommended  0.1 - 0.25          Discouraged         "      Remeasure/reassess PCT  <0.1                Strongly Discouraged     Remeasure/reassess PCT    As 28 day mortality risk marker: \"Change in Procalcitonin Result\" (>80% or <=80%) if Day 0 (or Day 1) and Day 4 values are available. Refer to http://[email or web address]-pct-calculator.[email or web address]    Change in PCT <=80%  A decrease of PCT levels below or equal to 80% defines a positive change in PCT test result representing a higher risk for 28-day all-cause mortality of patients diagnosed with severe sepsis for septic shock.    Change in PCT >80%  A decrease of PCT levels of more than 80% defines a negative change in PCT result representing a lower risk for 28-day all-cause mortality of patients diagnosed with severe sepsis or septic shock.    This test is Prognostic not Diagnostic, if elevated correlate with clinical findings before administering antibiotic treatment.        BNP [[id number]]  (Normal) Collected: 09/17/23 0944    Specimen: Blood Updated: 09/17/23 1127     proBNP 225.2 pg/mL     Narrative:      Among patients with dyspnea, NT-proBNP is highly sensitive for the detection of acute congestive heart failure. In addition NT-proBNP of <300 pg/ml effectively rules out acute congestive heart failure with 99% negative predictive value.      Urine Drug Screen - Indwelling Urethral Catheter [[id number]]  (Abnormal) Collected: 09/17/23 0957    Specimen: Urine from Indwelling Urethral Catheter Updated: 09/17/23 1047     Amphet/Methamphet, Screen Negative     Barbiturates Screen, Urine Negative     Benzodiazepine Screen, Urine Positive     Cocaine Screen, Urine Negative     Opiate Screen Negative     THC, Screen, Urine Positive     Methadone Screen, Urine Negative     Oxycodone Screen, Urine Negative     Fentanyl, Urine Negative    Narrative:      Negative Thresholds Per Drugs Screened:    Amphetamines                 500 ng/ml  Barbiturates                 200 ng/ml  Benzodiazepines              100 ng/ml  Cocaine                   "    300 ng/ml  Methadone                    300 ng/ml  Opiates                      300 ng/ml  Oxycodone                    100 ng/ml  THC                           50 ng/ml  Fentanyl                       5 ng/ml      The Normal Value for all drugs tested is negative. This report includes final unconfirmed screening results to be used for medical treatment purposes only. Unconfirmed results must not be used for non-medical purposes such as employment or legal testing. Clinical consideration should be applied to any drug of abuse test, particularly when unconfirmed results are used.            Lactic Acid, Plasma [[id number]]  (Abnormal) Collected: 09/17/23 0958    Specimen: Blood from Arm, Right Updated: 09/17/23 1035     Lactate 14.5 mmol/L     Valproic Acid Level, Total [[id number]]  (Abnormal) Collected: 09/17/23 0944    Specimen: Blood Updated: 09/17/23 1024     Valproic Acid <2.8 mcg/mL     Narrative:      Therapeutic Ranges for Valproic Acid    Epilepsy:       mcg/ml  Bipolar/Leticia  up to 125 mcg/ml      Comprehensive Metabolic Panel [[id number]]  (Abnormal) Collected: 09/17/23 0944    Specimen: Blood Updated: 09/17/23 1023     Glucose 163 mg/dL      BUN 17 mg/dL      Creatinine 1.23 mg/dL      Sodium 135 mmol/L      Potassium 5.3 mmol/L      Comment: Slight hemolysis detected by analyzer. Results may be affected.        Chloride 95 mmol/L      CO2 11.0 mmol/L      Calcium 9.2 mg/dL      Total Protein 7.6 g/dL      Albumin 4.8 g/dL      ALT (SGPT) 35 U/L      AST (SGOT) 27 U/L      Comment: Slight hemolysis detected by analyzer. Results may be affected.        Alkaline Phosphatase 122 U/L      Total Bilirubin 0.2 mg/dL      Globulin 2.8 gm/dL      A/G Ratio 1.7 g/dL      BUN/Creatinine Ratio 13.8     Anion Gap 29.0 mmol/L      eGFR 83.0 mL/min/1.73     Narrative:      GFR Normal >60  Chronic Kidney Disease <60  Kidney Failure <15      [place] Draw [[id number]] Collected: 09/17/23 0944    Specimen: Blood  Updated: 09/17/23 1020    Narrative:      The following orders were created for panel order [place] Draw.  Procedure                               Abnormality         Status                     ---------                               -----------         ------                     Green Top (Gel)[[id number]]                                  Final result               Lavender Top[[id number]]                                     Final result               Gold Top - SST[[id number]]                                   Final result               Light Blue Top[[id number]]                                   Final result                 Please view results for these tests on the individual orders.    Light Blue Top [[id number]] Collected: 09/17/23 0944    Specimen: Blood Updated: 09/17/23 1020     Extra Tube Hold for add-ons.     Comment: Auto resulted       Gold Top - SST [[id number]] Collected: 09/17/23 0944    Specimen: Blood Updated: 09/17/23 1020     Extra Tube Hold for add-ons.     Comment: Auto resulted.       Green Top (Gel) [[id number]] Collected: 09/17/23 0944    Specimen: Blood Updated: 09/17/23 1020     Extra Tube Hold for add-ons.     Comment: Auto resulted.       Lavender Top [[id number]] Collected: 09/17/23 0944    Specimen: Blood Updated: 09/17/23 1020     Extra Tube hold for add-on     Comment: Auto resulted       CK [[id number]]  (Normal) Collected: 09/17/23 0944    Specimen: Blood Updated: 09/17/23 1012     Creatine Kinase 168 U/L     Acetaminophen Level [[id number]]  (Normal) Collected: 09/17/23 0944    Specimen: Blood Updated: 09/17/23 1012     Acetaminophen <5.0 mcg/mL     Ethanol [[id number]] Collected: 09/17/23 0944    Specimen: Blood Updated: 09/17/23 1012     Ethanol <10 mg/dL      Ethanol % <0.010 %     Narrative:      Ethanol (Plasma)  <10 Essentially Negative    Toxic Concentrations           mg/dL    Flushing, slowing of reflexes    Impaired visual activity         Depression of CNS               >100  Possible Coma                  >300       Salicylate Level [[id number]]  (Normal) Collected: 09/17/23 0944    Specimen: Blood Updated: 09/17/23 1012     Salicylate 0.3 mg/dL     Levetiracetam Level (Keppra) [[id number]] Collected: 09/17/23 0958    Specimen: Blood from Arm, Right Updated: 09/17/23 1002    ABG with Co-Ox and Electrolytes [[id number]]  (Abnormal) Collected: 09/17/23 0946    Specimen: Arterial Blood from Arm, Left Updated: 09/17/23 0955     pH, Arterial 7.041 pH units      pCO2, Arterial 35.0 mm Hg      pO2, Arterial 83.8 mm Hg      HCO3, Arterial 9.3 mmol/L      Base Excess, Arterial -20.6 mmol/L      O2 Saturation, Arterial 91.4 %      Hemoglobin, Blood Gas 18.5 g/dL      Carboxyhemoglobin 1.4 %      Methemoglobin 0.50 %      Oxyhemoglobin 89.7 %      FHHB 8.4 %      Jason's Test Positive     Site Arterial: left radial     Modality non re-breather     FIO2 100 %      Flow Rate 15 lpm      Sodium, Arterial 135.8 mmol/L      Potassium, Arterial 4.98 mmol/L      Ionized Calcium, Arterial 1.23 mmol/L      Chloride, Arterial 100 mmol/L      Glucose, Arterial 153 mg/dL      Lactate, Arterial 13.93 mmol/L      PO2/FIO2 84    CBC & Differential [[id number]]  (Abnormal) Collected: 09/17/23 0944    Specimen: Blood Updated: 09/17/23 0953    Narrative:      The following orders were created for panel order CBC & Differential.  Procedure                               Abnormality         Status                     ---------                               -----------         ------                     CBC Auto Differential[[id number]]        Abnormal            Final result                 Please view results for these tests on the individual orders.    CBC Auto Differential [[id number]]  (Abnormal) Collected: 09/17/23 0944    Specimen: Blood Updated: 09/17/23 0953     WBC 29.88 10*3/mm3      RBC 5.91 10*6/mm3      Hemoglobin 17.8 g/dL      Hematocrit 54.2 %      MCV 91.7 fL      MCH 30.1 pg      MCHC 32.8 g/dL       RDW 12.5 %      RDW-SD 42.5 fl      MPV 9.3 fL      Platelets 405 10*3/mm3      Neutrophil % 74.8 %      Lymphocyte % 15.0 %      Monocyte % 8.4 %      Eosinophil % 0.4 %      Basophil % 0.5 %      Immature Grans % 0.9 %      Neutrophils, Absolute 22.36 10*3/mm3      Lymphocytes, Absolute 4.48 10*3/mm3      Monocytes, Absolute 2.52 10*3/mm3      Eosinophils, Absolute 0.11 10*3/mm3      Basophils, Absolute 0.15 10*3/mm3      Immature Grans, Absolute 0.26 10*3/mm3      nRBC 0.0 /100 WBC           Imaging Results (Last 24 Hours)       Procedure Component Value Units Date/Time    CT Chest With Contrast Diagnostic [[id number]] Collected: 09/17/23 1116     Updated: 09/17/23 1119    Narrative:      PROCEDURE: CT CHEST W CONTRAST DIAGNOSTIC     COMPARISON:  [facility], CR, XR CHEST 1 VW, 9/17/2023, 9:52.  [facility], CR, XR CHEST 1 VW, 3/11/2023, 17:37.     INDICATIONS: Respiratory distress, possible aspiration, seizure     TECHNIQUE: After obtaining the patient's consent, CT images were obtained with non-ionic   intravenous contrast material.       PROTOCOL:   Pulmonary embolism imaging protocol performed      RADIATION:   DLP: 778.7 mGy*cm    Automated exposure control was utilized to minimize radiation dose.   CONTRAST: 90 cc Isovue 370 I.V.     FINDINGS:   Respiratory motion artifact obscures evaluation of segmental and subsegmental pulmonary arteries.    No filling defects are seen in main or lobar segmental arteries.  Main pulmonary artery is   nondilated.  Heart size is normal.  There is no pleural or pericardial effusion.  There are   extensive ground-glass opacities in the upper lobes and superior segments of the lower lobes.  The   dependent lower lobes are spared.  No pneumothorax.  No adenopathy seen in the chest.  Partially   included solid organs of the upper abdomen appear unremarkable for the phase of contrast   enhancement.  No acute osseous abnormality is identified.        Impression:         1. No evidence of central pulmonary emboli.  Evaluation of segmental and subsegmental pulmonary   arteries is limited by respiratory motion.  2. Bilateral upper lung ground-glass opacities, which may be due to noncardiogenic pulmonary edema,   pulmonary hemorrhage, or atypical infection.            MELISSA GARCIA MD         Electronically Signed and Approved By: MELISSA GARCIA MD on 9/17/2023 at 11:16                     CT Head Without Contrast [[id number]] Collected: 09/17/23 1108     Updated: 09/17/23 1111    Narrative:      PROCEDURE: CT HEAD WO CONTRAST     COMPARISON:  [facility], MR, MRI BRAIN W WO CONTRAST, 9/02/2022, 10:35.  [facility], CT, CT HEAD WO CONTRAST, 9/01/2022, 16:18.     INDICATIONS: SEIZURE     PROTOCOL:   Standard imaging protocol performed      RADIATION:   DLP: 1307.2 mGy*cm    MA and/or KV was adjusted to minimize radiation dose.          TECHNIQUE: After obtaining the patient's consent, CT images were obtained without non-ionic   intravenous contrast material.      FINDINGS:   No acute intracranial hemorrhage, mass, mass effect, or evidence of acute ischemia is seen. The   ventricular system is nondilated. The basilar cisterns are patent. The skull is intact without   displaced fracture. The visualized paranasal sinuses and mastoid air cells are clear.       Impression:         No acute intracranial abnormality is identified.         MELISSA GARCIA MD         Electronically Signed and Approved By: MELISSA GARCIA MD on 9/17/2023 at 11:08                     XR Chest 1 View [[id number]] Collected: 09/17/23 1003     Updated: 09/17/23 1006    Narrative:      PROCEDURE: XR CHEST 1 VW     COMPARISON: [facility], CR, XR CHEST 1 VW, 9/01/2022, 15:47.  [facility], CR, XR CHEST 1 VW, 3/11/2023, 17:37.     INDICATIONS: SEIZURE     FINDINGS:   There are bilateral upper lung airspace opacities, which are new compared  to previous chest x-rays.    Lung bases appear spared.  No pneumothorax or large pleural effusion is seen on the semi erect   exams.  Heart size appears within normal limits.       Impression:         Bilateral upper lung airspace opacities, which may be due to pulmonary edema or   pneumonia/atelectasis.     Normal heart size.                  MELISSA GARCIA MD         Electronically Signed and Approved By: MELISSA GARCIA MD on 2023 at 10:03                              Consult Notes (last 24 hours)        Cory Glover MD at 23 1206        Consult Orders    1. Pulmonology (on-call MD unless specified) [[id number]] ordered by Pola De Leon DO at 23 1125                 Pulmonary / Critical Care Consult Note      Patient Name: Fred Wood  : 1995  MRN: [id number]  Primary Care Physician:  Provider, No Known  Referring Physician: Pola De Leon DO  Date of admission: 2023    Subjective   Subjective     Reason for Consult/ Chief Complaint:   Seizures, altered mental status    HPI:  Fred Wood is a 27 y.o. male with a history of seizures came in seizing.  Was given Versed and became combative and was placed on pads.  Had lactic acid of 14 and hypoxemic respiratory failure.  Also had O2 saturations in the 80s.  Chest CT shows negative pressure pulmonary edema.  On my assessment he is postictal but occasionally agitated on arousal.  He is trying to pull out all lines and tubes and when he is awake he is threatening to leave AMA.  He is very lethargic and hypoxic.    Review of Systems  Cannot obtain as patient is postictal and agitated    Personal History     Past Medical History:   Diagnosis Date   • Seizures        No past surgical history on file.    Family History: No pulmonary comorbidities noted in primary 5 members    Social History:  reports that he has been smoking cigarettes. He has been smoking an average of 1 pack per day. He does not have any smokeless tobacco history on  file. He reports current alcohol use. He reports current drug use. Drugs: Methamphetamines and Marijuana.    Home Medications:  OXcarbazepine, levETIRAcetam, omeprazole, and propranolol    Allergies:  No Known Allergies    Objective    Objective     Vitals:   Temp:  [99.6 °F (37.6 °C)] 99.6 °F (37.6 °C)  Heart Rate:  [] 85  Resp:  [22-42] 26  BP: (141-166)/(81-97) 166/91    Physical Exam:  Vital Signs Reviewed   General:  WDWN, Alert, NAD.    HEENT:  PERRL, EOMI.  OP, nares clear  Neck:  Supple, no JVD, no thyromegaly  Chest:  good aeration, clear to auscultation bilaterally, tympanic to percussion bilaterally, no work of breathing noted  CV: RRR, no MGR, pulses 2+, equal.  Abd:  Soft, NT, ND, + BS, no HSM  EXT:  no clubbing, no cyanosis, no edema  Neuro:  A&Ox0, postictal, lethargic but arousable with agitation, moves all extremities to pain, CN grossly intact, no focal deficits.  Skin: No rashes or lesions noted      Result Review    Result Review:  I have personally reviewed the results from the time of this admission to 9/17/2023 12:06 EDT and agree with these findings:  [x]  Laboratory  [x]  Microbiology  [x]  Radiology  [x]  EKG/Telemetry   [x]  Cardiology/Vascular   []  Pathology  []  Old records  []  Other:  Most notable findings include:   Lactic acid 14  Head CT negative  Chest CT has findings consistent with biapical lung infiltrates        Lab 09/17/23  0946 09/17/23  0944   WBC  --  29.88*   HEMOGLOBIN  --  17.8*   HEMATOCRIT  --  54.2*   PLATELETS  --  405   SODIUM  --  135*   SODIUM, ARTERIAL 135.8*  --    POTASSIUM  --  5.3*   CHLORIDE  --  95*   CO2  --  11.0*   BUN  --  17   CREATININE  --  1.23   GLUCOSE  --  163*   GLUCOSE, ARTERIAL 153*  --    CALCIUM  --  9.2   TOTAL PROTEIN  --  7.6   ALBUMIN  --  4.8   GLOBULIN  --  2.8         Assessment & Plan   Assessment / Plan     Active Hospital Problems:  Active Hospital Problems    Diagnosis    • **Seizure      Impression:  Altered mental  status  Toxic/metabolic encephalopathy  Seizure  Postictal state  Negative pressure pulmonary edema  Possible pneumonia for unspecified organism  Leukocytosis  Lactic acidosis, clinically significant  Hyperglycemia, stress-induced  Hyponatremia, clinically insignificant  Marijuana abuse possible sepsis, present on admission  Acute hypoxemic respiratory failure      Plan:  Admit to ICU  Patient appears to be postictal and has a profound lactic acidosis.  Likely due to seizures.  Could also be related to sepsis  Continue antiepileptics for now and continue sedatives as patient is psychotic at this time.  Agree with empiric antibiotics and panculture patient.  This is like related to negative pressure pulmonary edema.  Low threshold to discontinue antibiotics in 24 to 48 hours if cultures remain negative  Trend renal panel and electrolytes  Trend lactic acid  N.p.o. for now  Wean O2 to keep SPO2 greater than 90%  May spot dose Lasix if oxygenation does not improve  Zoom home antiepileptics    DVT prophylaxis:  No DVT prophylaxis order currently exists.     Code Status and Medical Interventions:   Ordered at: 09/17/23 1145     Code Status (Patient has no pulse and is not breathing):    CPR (Attempt to Resuscitate)     Medical Interventions (Patient has pulse or is breathing):    Full Support      I would strongly advise this patient not to leave AMA.  Very high chance he will not last very long at home and I suspect he would come back either intubated or dead      The patient is critically ill in the ICU with drug overdose, status epilepticus, altered mental status, lactic acidosis, negative pressure pulmonary edema, acute hypoxemic respiratory failure. Multidisciplinary bedside critical care rounds were performed with nursing staff, respiratory therapy, pharmacy, nutritional services, social work. I have personally reviewed the chart, labs and any pertinent imaging available.  I have spent 35 minutes of critical care  time, excluding procedures, in the care of this patient.    Electronically signed by Cory Glover MD, 09/17/23, 1:26 PM EDT.      Electronically signed by Cory Glover MD at 09/17/23 8543

## 2023-09-17 NOTE — SIGNIFICANT NOTE
Asked to admit Mr. Wood for breakthrough seizure, non cardiogenic pulmonary edema and lactic acidosis. Per report,  he had 2 seizures at home this morning, EMS was called and patient was altered.  He was witnessed to have a third seizure by EMS, was given 2.5 mg of Versed on the way to the ER.  Patient has been obtunded and been hypoxic, no new seizure noted in the ER. On arrival to ER he was noted to be agitated, received lorazepam 1 mg, followed by Haldol 5 mg and ketamine 100 mg.  He was placed in restraints.. No new witnessed seizure in the Er. Critical care and neurology was consulted.    Temperature on arrival was 99.6, tachycardia from 85 to 136 bpm, respiratory rate initially 42 improved 20.  Blood pressure 141/81.  Saturating 93% on nonrebreather mask.  Labs were significant for valproic acid less than 2.8, proBNP 225, CK1 68, 75.3, ethanol level less than 10, Tylenol level less than 5, WBC of 29.8, hemoglobin of 17.8, platelet count 405, Sodium 135, potassium 5.3, bicarb of 11 BUN of 17 creatinine 1.23, glucose of 163, lactic of 14, pH of 7.0, PCO2 35, PO2 of 83, procal 0.04.  UDS positive for benzos and THC ,Keppra level was collected.   He also received NS bolus x1, sodium bicarb 50 mg once, LR bolus times once.  CT head was unremarkable for any acute events.  Chest x-ray was remarkable for bilateral upper lung airspace opacities.  CT chest was performed which revealed no evidence of PE, evidence of bilateral upper lung ground glass opacities.  COVID and flu swabs were collected.  Blood cultures were ordered      Impression  Seizure disorder with breakthrough seizure.   Leukocytosis- likely reactive   Anion gap metabolic acidosis/Lactic acidosis due to seizure disorder   Bilateral upper lobe ground glass opacities likely non cardiogenic pulm edema in the setting of seizures  Hypoxia  Morbid obesity  History of substance abuse    Plan  Plan was to admit patient to ICU for monitoring of his neurological  as well as respiratory status. Trend vitals, labs and lactic acid   Patient continues to refuse medications, states he will be “all right”. He is awake alert oriented x 4. Follows all commands. States that he doesn't want to be admitted to the hospital.  States he will take his seizure meds and he will be “allright”  Advised patient that he be admitted for further management but he continues to refuse. He understands there is a risk of mortality and understands he likely end up coming back to hospital..  Spoke with Er physician who will assume care.

## 2023-09-17 NOTE — ED NOTES
Pt is still very combative and cursing fighting and swinging at staff pt had pulled off his soft  restraints  breaking one while in CT . Security was called to placed Tats on Dr. De Leon gave verbal order for ketamine 100 mg IVP at this time, it was given by LESIA Canchola  Security here for the tat placement also.

## 2023-09-17 NOTE — ED NOTES
Admitting providers in to talk with pt and he still states he wants to leave. We are awaiting his wife/family to arrive

## 2023-09-17 NOTE — ED NOTES
Pt given a coke to drink he is sitting up talking and his 02 sat keeps dipping down but he refuses to wear his 02 he states he is going home and no one is going to talk him into staying.

## 2023-09-17 NOTE — ED NOTES
Tats were removed during CT due to pt sleeping, tats reapplied by security once pt got off the CT table due to the episodic behavior and for staff safety.

## 2023-09-17 NOTE — ED NOTES
Assuming care of pt , pt is currently in CT at this time. Pt is screaming and combative and pulling his restraints off Dr. De Leon notified and ordered ativan 1 mg IVP x1 at this time.

## 2023-09-18 ENCOUNTER — APPOINTMENT (OUTPATIENT)
Dept: CARDIOLOGY | Facility: HOSPITAL | Age: 28
DRG: 100 | End: 2023-09-18
Payer: COMMERCIAL

## 2023-09-18 ENCOUNTER — APPOINTMENT (OUTPATIENT)
Dept: GENERAL RADIOLOGY | Facility: HOSPITAL | Age: 28
DRG: 100 | End: 2023-09-18
Payer: COMMERCIAL

## 2023-09-18 ENCOUNTER — READMISSION MANAGEMENT (OUTPATIENT)
Dept: CALL CENTER | Facility: HOSPITAL | Age: 28
End: 2023-09-18
Payer: COMMERCIAL

## 2023-09-18 VITALS
HEIGHT: 66 IN | BODY MASS INDEX: 39.19 KG/M2 | TEMPERATURE: 98.8 F | DIASTOLIC BLOOD PRESSURE: 73 MMHG | SYSTOLIC BLOOD PRESSURE: 119 MMHG | RESPIRATION RATE: 20 BRPM | OXYGEN SATURATION: 95 % | HEART RATE: 63 BPM | WEIGHT: 243.83 LBS

## 2023-09-18 LAB
ANION GAP SERPL CALCULATED.3IONS-SCNC: 9.1 MMOL/L (ref 5–15)
BASOPHILS # BLD AUTO: 0.04 10*3/MM3 (ref 0–0.2)
BASOPHILS NFR BLD AUTO: 0.4 % (ref 0–1.5)
BH CV ECHO MEAS - ACS: 2.4 CM
BH CV ECHO MEAS - AO ROOT DIAM: 3.5 CM
BH CV ECHO MEAS - EDV(CUBED): 103.8 ML
BH CV ECHO MEAS - EDV(MOD-SP4): 85.2 ML
BH CV ECHO MEAS - EF(MOD-SP4): 59.5 %
BH CV ECHO MEAS - ESV(CUBED): 35.9 ML
BH CV ECHO MEAS - ESV(MOD-SP4): 34.5 ML
BH CV ECHO MEAS - FS: 29.8 %
BH CV ECHO MEAS - IVS/LVPW: 1 CM
BH CV ECHO MEAS - IVSD: 0.8 CM
BH CV ECHO MEAS - LA DIMENSION: 3.2 CM
BH CV ECHO MEAS - LV DIASTOLIC VOL/BSA (35-75): 39.2 CM2
BH CV ECHO MEAS - LV MASS(C)D: 122.3 GRAMS
BH CV ECHO MEAS - LV SYSTOLIC VOL/BSA (12-30): 15.9 CM2
BH CV ECHO MEAS - LVIDD: 4.7 CM
BH CV ECHO MEAS - LVIDS: 3.3 CM
BH CV ECHO MEAS - LVOT AREA: 3.1 CM2
BH CV ECHO MEAS - LVOT DIAM: 2 CM
BH CV ECHO MEAS - LVPWD: 0.8 CM
BH CV ECHO MEAS - MED PEAK E' VEL: 12.1 CM/SEC
BH CV ECHO MEAS - MV A MAX VEL: 59 CM/SEC
BH CV ECHO MEAS - MV DEC TIME: 0.19 MSEC
BH CV ECHO MEAS - MV E MAX VEL: 111 CM/SEC
BH CV ECHO MEAS - MV E/A: 1.88
BH CV ECHO MEAS - RVDD: 2.4 CM
BH CV ECHO MEAS - SI(MOD-SP4): 23.3 ML/M2
BH CV ECHO MEAS - SV(MOD-SP4): 50.7 ML
BUN SERPL-MCNC: 10 MG/DL (ref 6–20)
BUN/CREAT SERPL: 11 (ref 7–25)
CALCIUM SPEC-SCNC: 8.6 MG/DL (ref 8.6–10.5)
CHLORIDE SERPL-SCNC: 103 MMOL/L (ref 98–107)
CO2 SERPL-SCNC: 24.9 MMOL/L (ref 22–29)
CREAT SERPL-MCNC: 0.91 MG/DL (ref 0.76–1.27)
DEPRECATED RDW RBC AUTO: 40.5 FL (ref 37–54)
EGFRCR SERPLBLD CKD-EPI 2021: 119.2 ML/MIN/1.73
EOSINOPHIL # BLD AUTO: 0.04 10*3/MM3 (ref 0–0.4)
EOSINOPHIL NFR BLD AUTO: 0.4 % (ref 0.3–6.2)
ERYTHROCYTE [DISTWIDTH] IN BLOOD BY AUTOMATED COUNT: 12.7 % (ref 12.3–15.4)
GLUCOSE BLDC GLUCOMTR-MCNC: 199 MG/DL (ref 70–99)
GLUCOSE SERPL-MCNC: 114 MG/DL (ref 65–99)
HCT VFR BLD AUTO: 42.1 % (ref 37.5–51)
HGB BLD-MCNC: 14 G/DL (ref 13–17.7)
IMM GRANULOCYTES # BLD AUTO: 0.04 10*3/MM3 (ref 0–0.05)
IMM GRANULOCYTES NFR BLD AUTO: 0.4 % (ref 0–0.5)
LYMPHOCYTES # BLD AUTO: 1.79 10*3/MM3 (ref 0.7–3.1)
LYMPHOCYTES NFR BLD AUTO: 16.2 % (ref 19.6–45.3)
MAGNESIUM SERPL-MCNC: 2.4 MG/DL (ref 1.6–2.6)
MCH RBC QN AUTO: 29 PG (ref 26.6–33)
MCHC RBC AUTO-ENTMCNC: 33.3 G/DL (ref 31.5–35.7)
MCV RBC AUTO: 87.2 FL (ref 79–97)
MONOCYTES # BLD AUTO: 0.97 10*3/MM3 (ref 0.1–0.9)
MONOCYTES NFR BLD AUTO: 8.8 % (ref 5–12)
NEUTROPHILS NFR BLD AUTO: 73.8 % (ref 42.7–76)
NEUTROPHILS NFR BLD AUTO: 8.19 10*3/MM3 (ref 1.7–7)
NRBC BLD AUTO-RTO: 0 /100 WBC (ref 0–0.2)
PHOSPHATE SERPL-MCNC: 3.4 MG/DL (ref 2.5–4.5)
PLATELET # BLD AUTO: 209 10*3/MM3 (ref 140–450)
PMV BLD AUTO: 9.7 FL (ref 6–12)
POTASSIUM SERPL-SCNC: 4.2 MMOL/L (ref 3.5–5.2)
RBC # BLD AUTO: 4.83 10*6/MM3 (ref 4.14–5.8)
SODIUM SERPL-SCNC: 137 MMOL/L (ref 136–145)
WBC NRBC COR # BLD: 11.07 10*3/MM3 (ref 3.4–10.8)

## 2023-09-18 PROCEDURE — 85025 COMPLETE CBC W/AUTO DIFF WBC: CPT | Performed by: STUDENT IN AN ORGANIZED HEALTH CARE EDUCATION/TRAINING PROGRAM

## 2023-09-18 PROCEDURE — 93306 TTE W/DOPPLER COMPLETE: CPT

## 2023-09-18 PROCEDURE — 84100 ASSAY OF PHOSPHORUS: CPT | Performed by: STUDENT IN AN ORGANIZED HEALTH CARE EDUCATION/TRAINING PROGRAM

## 2023-09-18 PROCEDURE — 25010000002 PIPERACILLIN SOD-TAZOBACTAM PER 1 G: Performed by: STUDENT IN AN ORGANIZED HEALTH CARE EDUCATION/TRAINING PROGRAM

## 2023-09-18 PROCEDURE — 71045 X-RAY EXAM CHEST 1 VIEW: CPT

## 2023-09-18 PROCEDURE — 83735 ASSAY OF MAGNESIUM: CPT | Performed by: STUDENT IN AN ORGANIZED HEALTH CARE EDUCATION/TRAINING PROGRAM

## 2023-09-18 PROCEDURE — 25010000002 VANCOMYCIN 5 G RECONSTITUTED SOLUTION: Performed by: STUDENT IN AN ORGANIZED HEALTH CARE EDUCATION/TRAINING PROGRAM

## 2023-09-18 PROCEDURE — 80048 BASIC METABOLIC PNL TOTAL CA: CPT | Performed by: STUDENT IN AN ORGANIZED HEALTH CARE EDUCATION/TRAINING PROGRAM

## 2023-09-18 PROCEDURE — 93306 TTE W/DOPPLER COMPLETE: CPT | Performed by: INTERNAL MEDICINE

## 2023-09-18 RX ADMIN — PIPERACILLIN AND TAZOBACTAM 3.38 G: 3; .375 INJECTION, POWDER, LYOPHILIZED, FOR SOLUTION INTRAVENOUS at 06:45

## 2023-09-18 RX ADMIN — VANCOMYCIN HYDROCHLORIDE 1250 MG: 500 INJECTION, POWDER, LYOPHILIZED, FOR SOLUTION INTRAVENOUS at 05:17

## 2023-09-18 NOTE — PLAN OF CARE
Goal Outcome Evaluation:         Goals of care discussed with: Patient and family    Outcome: Pt is A&Ox4, NSR, Rm air. While pt was sleeping his SPO2 would drop to mid 80's, I placed 2L NC on pt, which seemed to improve numbers but pt said it was uncomfortable and didn't want to leave this on. I educated patient on the importance of keeping nasal canula on and why he needed it. Attempted bedside dysphagia screen per Dr. Coronel and pt failed attempt. Pt had a cough seconds after drinking water.

## 2023-09-18 NOTE — SIGNIFICANT NOTE
"Patient left AMA at 0715. Patient refused echocardiogram and ripped out IV infusion. RN explained medical risks of leaving and medical benefits of continuing treatment. Patient was unable to contact family to get a ride but chooses to leave any way and states \"he will walk to his sisters house across the street.\"     "

## 2023-09-18 NOTE — DISCHARGE SUMMARY
[facility]        HOSPITALIST  DISCHARGE SUMMARY    Patient Name: Fred Wood  : 1995  MRN: [id number]    Date of Admission: 2023  Date of Discharge:  2023  Primary Care Physician: Provider, No Known    Consults       Date and Time Order Name Status Description    2023 11:40 AM Inpatient Neurology Consult Other (see comments) (Seizure)      2023 11:25 AM Pulmonology (on-call MD unless specified) Completed     2023 11:04 AM Hospitalist (on-call MD unless specified)              Active and Resolved Hospital Problems:  Active Hospital Problems    Diagnosis POA   • **Seizure [R56.9] Yes      Resolved Hospital Problems   No resolved problems to display.     Seizure disorder with breakthrough seizure  Leukocytosis likely reactive  Acute hypoxic respiratory failure  Anion gap metabolic acidosis/lactic acidosis  Toxic metabolic encephalopathy  Bilateral upper lobe groundglass opacities likely noncardiogenic pulm edema in the setting of seizures  Morbid obesity  History of substance abuse , UDS positive for marijuana   Hospital Course     Hospital Course:  Fred Wood is a 27 y.o. male with significant of seizure, breakthrough seizures, amphetamine abuse, morbid obesity was brought in by EMS due to seizure.  Per report,  he had 2 seizures at home this morning, EMS was called and patient was altered.  He was witnessed to have a third seizure by EMS, was given 2.5 mg of Versed on the way to the ER.  Patient has been obtunded and been hypoxic, no new seizure noted in the ER. On arrival to ER he was noted to be agitated, received lorazepam 1 mg, followed by Haldol 5 mg and ketamine 100 mg.  He was placed in restraints.. No new witnessed seizure in the Er. Critical care and neurology was consulted. Please see previous significant note patient initially refused to stay.  Recalled to admit patient and he had agreed to stay in the hospital.     He is much more awake, no new  seizure episode witnessed in the ER.  Reports feels well, denies any chest pain or shortness of breath, lightheadedness dizziness or syncopal episodes.  He reports he will cooperate with care.     Temperature on arrival was 99.6, tachycardia from 85 to 136 bpm, respiratory rate initially 42 improved 20.  Blood pressure 141/81.  Saturating 93% on nonrebreather mask.  Labs were significant for valproic acid less than 2.8, proBNP 225, , 75.3, ethanol level less than 10, Tylenol level less than 5, WBC of 29.8, hemoglobin of 17.8, platelet count 405, Sodium 135, potassium 5.3, bicarb of 11 BUN of 17 creatinine 1.23, glucose of 163, lactic of 14, pH of 7.0, PCO2 35, PO2 of 83, procal 0.04, normal pro-BNP,  UDS positive for benzos and THC ,Keppra level was collected.  He also received NS bolus x1, sodium bicarb 50 mg once, LR bolus times once.  CT head was unremarkable for any acute events.  Chest x-ray was remarkable for bilateral upper lung airspace opacities.  CT chest was performed which revealed no evidence of PE, evidence of bilateral upper lung ground glass opacities.  COVID and flu swabs were collected.  Blood cultures were ordered          DISCHARGE Follow Up Recommendations for labs and diagnostics: Patient left CCU after signing AMA.  Patient was fully awake alert and oriented.      Day of Discharge     Vital Signs:  Temp:  [98.8 °F (37.1 °C)-99.6 °F (37.6 °C)] 98.8 °F (37.1 °C)  Heart Rate:  [] 63  Resp:  [13-42] 20  BP: ()/(67-97) 119/73  Flow (L/min):  [2] 2    Physical Exam: Left AGAINST MEDICAL ADVICE      Discharge Details        Discharge Medications        ASK your doctor about these medications        Instructions Start Date   levETIRAcetam 750 MG tablet  Commonly known as: KEPPRA  Ask about: Which instructions should I use?   1,500 mg, Oral, 2 Times Daily      omeprazole 40 MG capsule  Commonly known as: priLOSEC   40 mg, Oral, Daily      OXcarbazepine 600 MG tablet  Commonly known  as: TRILEPTAL   600 mg, Oral, 2 Times Daily      propranolol 40 MG tablet  Commonly known as: INDERAL   40 mg, Oral, 2 Times Daily               No Known Allergies    Discharge Disposition:  Home or Self Care    Diet:      Discharge Activity:       CODE STATUS:  Code Status and Medical Interventions:   Ordered at: 09/17/23 1145     Code Status (Patient has no pulse and is not breathing):    CPR (Attempt to Resuscitate)     Medical Interventions (Patient has pulse or is breathing):    Full Support         No future appointments.        Pertinent  and/or Most Recent Results     PROCEDURES:   * Cannot find OR case *     LAB RESULTS:      Lab 09/18/23 0246 09/17/23 1952 09/17/23 1729 09/17/23  1614 09/17/23  0958 09/17/23  0946 09/17/23  0944   WBC 11.07*  --  17.64*  --   --   --  29.88*   HEMOGLOBIN 14.0  --  14.9  --   --   --  17.8*   HEMATOCRIT 42.1  --  45.5  --   --   --  54.2*   PLATELETS 209  --  233  --   --   --  405   NEUTROS ABS 8.19*  --  14.33*  --   --   --  22.36*   IMMATURE GRANS (ABS) 0.04  --  0.07*  --   --   --  0.26*   LYMPHS ABS 1.79  --  1.71  --   --   --  4.48*   MONOS ABS 0.97*  --  1.48*  --   --   --  2.52*   EOS ABS 0.04  --  0.01  --   --   --  0.11   MCV 87.2  --  88.7  --   --   --  91.7   PROCALCITONIN  --   --   --   --   --   --  0.04   LACTATE  --  2.0  --  2.0 14.5*  --   --    LACTATE, ARTERIAL  --   --  3.56*  --   --  13.93*  --          Lab 09/18/23 0246 09/17/23 1729 09/17/23  0946 09/17/23  0944   SODIUM 137 136  --  135*   SODIUM, ARTERIAL  --   --  135.8*  --    SODIUM, VENOUS  --  134.4*  --   --    POTASSIUM 4.2 4.7  --  5.3*   POTASSIUM, VENOUS  --  4.6  --   --    CHLORIDE 103 101  --  95*   CHLORIDE, VENOUS  --  102  --   --    CO2 24.9 22.6  --  11.0*   ANION GAP 9.1 12.4  --  29.0*   BUN 10 12  --  17   CREATININE 0.91 0.99  --  1.23   EGFR 119.2 107.7  --  83.0   GLUCOSE 114* 94  --  163*   GLUCOSE, ARTERIAL  --  88 153*  --    CALCIUM 8.6 8.7  --  9.2    IONIZED CALCIUM  --  1.02* 1.23  --    MAGNESIUM 2.4 2.4  --   --    PHOSPHORUS 3.4 3.3  --   --          Lab 09/17/23  0944   TOTAL PROTEIN 7.6   ALBUMIN 4.8   GLOBULIN 2.8   ALT (SGPT) 35   AST (SGOT) 27   BILIRUBIN 0.2   ALK PHOS 122*         Lab 09/17/23 1952 09/17/23  1729 09/17/23  0944   PROBNP  --   --  225.2   HSTROP T 8 9  --                  Lab 09/17/23  1729 09/17/23  0946   PH, ARTERIAL  --  7.041*   PCO2, ARTERIAL  --  35.0   PO2 ART  --  83.8   O2 SATURATION ART  --  91.4*   FIO2 21 100   HCO3 ART  --  9.3*   BASE EXCESS ART  --  -20.6*   CARBOXYHEMOGLOBIN 4.2* 1.4     Brief Urine Lab Results  (Last result in the past 365 days)        Color   Clarity   Blood   Leuk Est   Nitrite   Protein   CREAT   Urine HCG        03/11/23 1403 Yellow   Turbid   Small (1+)   Negative   Negative   30 mg/dL (1+)                 Microbiology Results (last 10 days)       Procedure Component Value - Date/Time    MRSA Screen, PCR (Inpatient) - Swab, Nares [[id number]]  (Normal) Collected: 09/17/23 1657    Lab Status: Final result Specimen: Swab from Nares Updated: 09/17/23 1821     MRSA PCR No MRSA Detected    Narrative:      The negative predictive value of this diagnostic test is high and should only be used to consider de-escalating anti-MRSA therapy. A positive result may indicate colonization with MRSA and must be correlated clinically.    Legionella Antigen, Urine - Urine, Urine, Clean Catch [[id number]]  (Normal) Collected: 09/17/23 1614    Lab Status: Final result Specimen: Urine, Clean Catch Updated: 09/17/23 1659     LEGIONELLA ANTIGEN, URINE Negative    S. Pneumo Ag Urine or CSF - Urine, Urine, Clean Catch [[id number]]  (Normal) Collected: 09/17/23 1614    Lab Status: Final result Specimen: Urine, Clean Catch Updated: 09/17/23 1659     Strep Pneumo Ag Negative    COVID-19,CEPHEID/FAVIOLA,COR/KATIA/PAD/WILTON/MAD IN-HOUSE(OR EMERGENT/ADD-ON),NP SWAB IN TRANSPORT MEDIA 3-4 HR TAT, RT-PCR - Swab, Nasopharynx [[id number]]   (Normal) Collected: 09/17/23 1610    Lab Status: Final result Specimen: Swab from Nasopharynx Updated: 09/17/23 1713     COVID19 Not Detected    Narrative:      Fact sheet for providers: https://www.fda.gov/media/414001/download     Fact sheet for patients: https://www.fda.gov/media/509040/download  Fact sheet for providers: https://www.fda.gov/media/294832/download     Fact sheet for patients: https://www.fda.gov/media/838476/download    Influenza Antigen, Rapid - Swab, Nasopharynx [[id number]]  (Normal) Collected: 09/17/23 1610    Lab Status: Final result Specimen: Swab from Nasopharynx Updated: 09/17/23 1652     Influenza A Ag, EIA Negative     Influenza B Ag, EIA Negative            CT Head Without Contrast    Result Date: 9/17/2023  Impression:   No acute intracranial abnormality is identified.    MELISSA GARCIA MD       Electronically Signed and Approved By: MELISSA GARCIA MD on 9/17/2023 at 11:08             CT Chest With Contrast Diagnostic    Result Date: 9/17/2023  Impression:   1. No evidence of central pulmonary emboli.  Evaluation of segmental and subsegmental pulmonary arteries is limited by respiratory motion. 2. Bilateral upper lung ground-glass opacities, which may be due to noncardiogenic pulmonary edema, pulmonary hemorrhage, or atypical infection.     MELISSA GARCIA MD       Electronically Signed and Approved By: MELISSA GARCIA MD on 9/17/2023 at 11:16             XR Chest 1 View    Result Date: 9/18/2023  Impression:  There has been interval decrease in the bilateral infiltrates with minimal, if any, persisting.      Please note that portions of this note were completed with a voice recognition program.  JACLYN MONTEZ JR, MD       Electronically Signed and Approved By: JACLYN MONTEZ JR, MD on 9/18/2023 at 6:24              XR Chest 1 View    Result Date: 9/17/2023  Impression:   Bilateral upper lung airspace opacities, which may be due to pulmonary edema or pneumonia/atelectasis.   Normal heart size.       MELISSA GARCIA MD       Electronically Signed and Approved By: MELISSA GARCIA MD on 9/17/2023 at 10:03                          Imaging Results (All)       Procedure Component Value Units Date/Time    XR Chest 1 View [[id number]] Collected: 09/18/23 0624     Updated: 09/18/23 0627    Narrative:      PROCEDURE: XR CHEST 1 VW     COMPARISON: 9/17/2023, 0952 hours.     INDICATIONS: Possible non-cardiogenic pulmonary edema.     FINDINGS: A single frontal (AP or PA upright portable) chest radiograph reveals no   cardiomediastinal enlargement and minimal, if any, acute infiltrate. No pneumothorax is seen.  No   pleural effusion is suggested.       Impression:       There has been interval decrease in the bilateral infiltrates with minimal, if any,   persisting.                Please note that portions of this note were completed with a voice recognition program.     JACLYN MONTEZ JR, MD         Electronically Signed and Approved By: JACLYN MONTEZ JR, MD on 9/18/2023 at 6:24                        CT Chest With Contrast Diagnostic [[id number]] Collected: 09/17/23 1116     Updated: 09/17/23 1119    Narrative:      PROCEDURE: CT CHEST W CONTRAST DIAGNOSTIC     COMPARISON:  [facility], CR, XR CHEST 1 VW, 9/17/2023, 9:52.  [facility], CR, XR CHEST 1 VW, 3/11/2023, 17:37.     INDICATIONS: Respiratory distress, possible aspiration, seizure     TECHNIQUE: After obtaining the patient's consent, CT images were obtained with non-ionic   intravenous contrast material.       PROTOCOL:   Pulmonary embolism imaging protocol performed      RADIATION:   DLP: 778.7 mGy*cm    Automated exposure control was utilized to minimize radiation dose.   CONTRAST: 90 cc Isovue 370 I.V.     FINDINGS:   Respiratory motion artifact obscures evaluation of segmental and subsegmental pulmonary arteries.    No filling defects are seen in main or lobar segmental arteries.  Main pulmonary artery is    nondilated.  Heart size is normal.  There is no pleural or pericardial effusion.  There are   extensive ground-glass opacities in the upper lobes and superior segments of the lower lobes.  The   dependent lower lobes are spared.  No pneumothorax.  No adenopathy seen in the chest.  Partially   included solid organs of the upper abdomen appear unremarkable for the phase of contrast   enhancement.  No acute osseous abnormality is identified.       Impression:         1. No evidence of central pulmonary emboli.  Evaluation of segmental and subsegmental pulmonary   arteries is limited by respiratory motion.  2. Bilateral upper lung ground-glass opacities, which may be due to noncardiogenic pulmonary edema,   pulmonary hemorrhage, or atypical infection.            MELISSA GARCIA MD         Electronically Signed and Approved By: MELISSA GARCIA MD on 9/17/2023 at 11:16                     CT Head Without Contrast [[id number]] Collected: 09/17/23 1108     Updated: 09/17/23 1111    Narrative:      PROCEDURE: CT HEAD WO CONTRAST     COMPARISON:  [facility], MR, MRI BRAIN W WO CONTRAST, 9/02/2022, 10:35.  [facility], CT, CT HEAD WO CONTRAST, 9/01/2022, 16:18.     INDICATIONS: SEIZURE     PROTOCOL:   Standard imaging protocol performed      RADIATION:   DLP: 1307.2 mGy*cm    MA and/or KV was adjusted to minimize radiation dose.          TECHNIQUE: After obtaining the patient's consent, CT images were obtained without non-ionic   intravenous contrast material.      FINDINGS:   No acute intracranial hemorrhage, mass, mass effect, or evidence of acute ischemia is seen. The   ventricular system is nondilated. The basilar cisterns are patent. The skull is intact without   displaced fracture. The visualized paranasal sinuses and mastoid air cells are clear.       Impression:         No acute intracranial abnormality is identified.         MELISSA GARCIA MD         Electronically Signed and Approved  By: MELISSA GARCIA MD on 9/17/2023 at 11:08                     XR Chest 1 View [[id number]] Collected: 09/17/23 1003     Updated: 09/17/23 1006    Narrative:      PROCEDURE: XR CHEST 1 VW     COMPARISON: [facility], CR, XR CHEST 1 VW, 9/01/2022, 15:47.  [facility], CR, XR CHEST 1 VW, 3/11/2023, 17:37.     INDICATIONS: SEIZURE     FINDINGS:   There are bilateral upper lung airspace opacities, which are new compared to previous chest x-rays.    Lung bases appear spared.  No pneumothorax or large pleural effusion is seen on the semi erect   exams.  Heart size appears within normal limits.       Impression:         Bilateral upper lung airspace opacities, which may be due to pulmonary edema or   pneumonia/atelectasis.     Normal heart size.                  MELISSA GARCIA MD         Electronically Signed and Approved By: MELISSA GARCIA MD on 9/17/2023 at 10:03                              Labs Pending at Discharge:  Pending Labs       Order Current Status    Blood Culture - Blood, Arm, Left In process    Blood Culture - Blood, Arm, Right In process    Levetiracetam Level (Keppra) In process                Time spent on Discharge including face to face service:   minutes  Part of this note may be an electronic transcription/translation of spoken language to printed text using the Dragon Dictation System.     TElectronically signed by Praveen Major MD, 09/18/23, 8:45 AM EDT.

## 2023-09-19 NOTE — OUTREACH NOTE
Prep Survey      Flowsheet Row Responses   [facility] facility patient discharged from? [place]   Is LACE score < 7 ? Yes   Eligibility Not Eligible   What are the reasons patient is not eligible? Other  [low risk for readmit]   Does the patient have one of the following disease processes/diagnoses(primary or secondary)? Other   Prep survey completed? Yes            Светлана QUINTANILLA - Registered Nurse

## 2023-09-20 LAB
LEVETIRACETAM SERPL-MCNC: 6.8 UG/ML (ref 10–40)
QT INTERVAL: 355 MS
QTC INTERVAL: 406 MS

## 2023-09-22 LAB
BACTERIA SPEC AEROBE CULT: NORMAL
BACTERIA SPEC AEROBE CULT: NORMAL

## 2024-03-29 ENCOUNTER — OFFICE VISIT (OUTPATIENT)
Dept: NEUROLOGY | Facility: CLINIC | Age: 29
End: 2024-03-29
Payer: COMMERCIAL

## 2024-03-29 VITALS
DIASTOLIC BLOOD PRESSURE: 78 MMHG | BODY MASS INDEX: 38.25 KG/M2 | HEART RATE: 98 BPM | SYSTOLIC BLOOD PRESSURE: 118 MMHG | HEIGHT: 66 IN | WEIGHT: 238 LBS

## 2024-03-29 DIAGNOSIS — R56.9 GENERALIZED CONVULSIVE SEIZURES: ICD-10-CM

## 2024-03-29 DIAGNOSIS — G40.109 LOCALIZATION-RELATED SYMPTOMATIC EPILEPSY AND EPILEPTIC SYNDROMES WITH SIMPLE PARTIAL SEIZURES, NOT INTRACTABLE, WITHOUT STATUS EPILEPTICUS: Primary | ICD-10-CM

## 2024-03-29 PROBLEM — F44.5 PSYCHOGENIC NONEPILEPTIC SEIZURE: Status: RESOLVED | Noted: 2022-09-02 | Resolved: 2024-03-29

## 2024-03-29 RX ORDER — FLUTICASONE PROPIONATE 50 MCG
1 SPRAY, SUSPENSION (ML) NASAL DAILY
COMMUNITY
Start: 2024-02-08

## 2024-03-29 RX ORDER — LACOSAMIDE 200 MG/1
TABLET ORAL
Qty: 60 TABLET | Refills: 5 | Status: SHIPPED | OUTPATIENT
Start: 2024-03-29

## 2024-03-29 RX ORDER — LACOSAMIDE 150 MG/1
TABLET ORAL
Qty: 14 TABLET | Refills: 0 | Status: SHIPPED | OUTPATIENT
Start: 2024-03-29

## 2024-03-29 RX ORDER — GABAPENTIN 300 MG/1
300 CAPSULE ORAL 2 TIMES DAILY
COMMUNITY

## 2024-03-29 RX ORDER — LACOSAMIDE 100 MG/1
TABLET ORAL
Qty: 14 TABLET | Refills: 0 | Status: SHIPPED | OUTPATIENT
Start: 2024-03-29

## 2024-03-29 RX ORDER — LEVETIRACETAM 750 MG/1
TABLET ORAL
Qty: 360 TABLET | Refills: 3 | Status: SHIPPED | OUTPATIENT
Start: 2024-03-29

## 2024-03-29 RX ORDER — LACOSAMIDE 50 MG/1
TABLET ORAL
Qty: 14 TABLET | Refills: 0 | Status: SHIPPED | OUTPATIENT
Start: 2024-03-29

## 2024-03-29 RX ORDER — LEVETIRACETAM 1000 MG/1
2000 TABLET ORAL NIGHTLY
COMMUNITY
End: 2024-03-29

## 2024-03-29 NOTE — PROGRESS NOTES
Chief Complaint  Seizures    Subjective          Fred Wood is a 28 y.o. male who presents to [facility]  History of Present Illness  28-year-old man evaluated for seizures.  He has had seizures since he was a teenager.  He has had recurrent seizures in the past and he was compliant with his medications and continued to have breakthrough seizures.  I saw him once September 2022 and at that time he was taking levetiracetam 750 mg 2 twice a day and oxcarbazepine 600 mg twice a day.  I increased his Keppra to 1500 mg twice a day and I also started him on Depakote ER 1000 mg daily and he needs to take oxcarbazepine 600 mg twice a day.  He had an MRI of the brain with and without contrast at that time which did not show any acute intracranial abnormality and no epileptogenic foci was identified.  He was admitted to [facility] earlier this month for status epilepticus and he was discharged on Keppra 1500 mg in the morning and 2000 mg at night.  There were no other medications noted.    He states that that he has seizures intermittently randomly.  His father is with him.  He can have a seizure 5-6 times in 1 month and they are usually back to back and then he may not have another seizure for 2 months.  He states the last seizure that was earlier this month and prior to that was about 3 months ago and prior to that was about 6 months ago.  He has broken his nose as well as bit his sides of his tongue many times and has injured himself many times.  He states that he has a warning and gets scared for seconds to minutes and then he will come close to people so that they can catch him and he does not fall down and hurt himself.    He has been living with his grandmother.  His father is with him today.  He and his wife  3 years ago.  He he has a 6-year-old child.  He is not working.  He cannot work.    Objective   Vital Signs:   /78 (BP Location: Right arm,  "Patient Position: Sitting, Cuff Size: Adult)   Pulse 98   Ht 167.6 cm (65.98\")   Wt 108 kg (238 lb)   BMI 38.43 kg/m²     Physical Exam   He is alert, fluent, phasic, follows commands well.  Optic disc are normal bilaterally's are full, EMs are full directions gaze, facial strength is full, soft palate elevation and tongue are normal.  There is no weakness of the upper or lower extremities individual muscle testing.  Fine finger movements are intact.  Reflexes are symmetrically decreased.  Station and gait is unremarkable.  Heart is regular and rhythm normal in rate.        Assessment and Plan  Diagnoses and all orders for this visit:    1. Localization-related symptomatic epilepsy and epileptic syndromes with simple partial seizures, not intractable, without status epilepticus (Primary)  Assessment & Plan:  I discussed with him and his father that he is medically refractory epilepsy and he will need to go to the [facility] epilepsy clinic to discuss regarding epilepsy monitoring.  I discussed with them regarding epilepsy surgery.  I will continue him on Keppra 1005 mg twice a day and I will add Vimpat to his regimen.  I discussed with him and his father that this is a drug substance and the adverse effects including feeling intoxicated.  He is to start taking 50 mg twice a day for 1 week, 100 mg twice a day the second week, 150 mg twice a day the third week and then 200 mg twice a day thereafter.  He is not driving.  He is not working.  He is aware of [place] driving laws.  He will follow-up with me in the next 3 to 4 months after he sees the [facility] epilepsy clinic.  Thank you for letting me participate in his care.    Orders:  -     lacosamide (VIMPAT) 50 MG tablet tablet; 1 tablet twice a day for first week  Dispense: 14 tablet; Refill: 0  -     lacosamide (VIMPAT) 100 MG tablet tablet; 1 tablet twice a day second week  Dispense: 14 tablet; Refill: 0  -     lacosamide 150 MG tablet; " 1 tablet twice a day third week  Dispense: 14 tablet; Refill: 0  -     lacosamide (VIMPAT) 200 MG tablet; 1 tablet twice a day for 4th week and on.  Dispense: 60 tablet; Refill: 5  -     Ambulatory Referral to Neurology    2. Generalized convulsive seizures  -     lacosamide (VIMPAT) 50 MG tablet tablet; 1 tablet twice a day for first week  Dispense: 14 tablet; Refill: 0  -     lacosamide (VIMPAT) 100 MG tablet tablet; 1 tablet twice a day second week  Dispense: 14 tablet; Refill: 0  -     lacosamide 150 MG tablet; 1 tablet twice a day third week  Dispense: 14 tablet; Refill: 0  -     lacosamide (VIMPAT) 200 MG tablet; 1 tablet twice a day for 4th week and on.  Dispense: 60 tablet; Refill: 5  -     Ambulatory Referral to Neurology    Other orders  -     levETIRAcetam (KEPPRA) 750 MG tablet; 2 tablets twice a day  Dispense: 360 tablet; Refill: 3         Total time spent with the patient and coordinating patient care was 51 minutes.    Follow Up  No follow-ups on file.  Patient was given instructions and counseling regarding his condition or for health maintenance advice. Please see specific information pulled into the AVS if appropriate.

## 2024-03-29 NOTE — ASSESSMENT & PLAN NOTE
I discussed with him and his father that he is medically refractory epilepsy and he will need to go to the [facility] epilepsy clinic to discuss regarding epilepsy monitoring.  I discussed with them regarding epilepsy surgery.  I will continue him on Keppra 1005 mg twice a day and I will add Vimpat to his regimen.  I discussed with him and his father that this is a drug substance and the adverse effects including feeling intoxicated.  He is to start taking 50 mg twice a day for 1 week, 100 mg twice a day the second week, 150 mg twice a day the third week and then 200 mg twice a day thereafter.  He is not driving.  He is not working.  He is aware of [employer] [place].  He will follow-up with me in the next 3 to 4 months after he sees the [facility] epilepsy clinic.  Thank you for letting me participate in his care.

## 2024-05-22 ENCOUNTER — TELEPHONE (OUTPATIENT)
Dept: NEUROLOGY | Facility: CLINIC | Age: 29
End: 2024-05-22
Payer: COMMERCIAL

## 2024-05-22 NOTE — TELEPHONE ENCOUNTER
Caller: RALPH BLAKE    Relationship: Emergency Contact    Best call back number: [phone]*    What is the best time to reach you: ANYTIME      What was the call regarding: PATIENT IS SCHEDULED FOR TESTING AT [facility] THAT DR IVAN SENT HIM FOR BUT THEY NEED TO CONTACT THAT OFFICE FOR PPW FOR COURT. THEY CALLED THE SCHEDULING NUMBER BUT THAT WAS UNSUCCESSFUL BUT ASK IF DR IVAN MAY HAVE THE OFFICE NUMBER FOR THE PATIENT TO CALL.     PLEASE ADVISE  THANK YOU

## 2024-05-22 NOTE — TELEPHONE ENCOUNTER
Name: RALPH BLAKE    Relationship: Emergency Contact    Best Callback Number: [phone]    HUB PROVIDED THE RELAY MESSAGE FROM THE OFFICE   PATIENT HAS FURTHER QUESTIONS AND WOULD LIKE A CALL BACK AT THE FOLLOWING PHONE NUMBER [phone]      PLEASE REVIEW    THANK YOU

## 2024-05-22 NOTE — TELEPHONE ENCOUNTER
Spoke with pt on the phone and let him know that we do not have a record of any testing that would require a 4-5 day hospital stay therefore cannot give them a letter to that affect for court. Gave number to  where pt has been referred to inquire about the testing and a letter. Pt verbalized understanding.

## 2024-07-01 ENCOUNTER — OFFICE VISIT (OUTPATIENT)
Dept: NEUROLOGY | Facility: CLINIC | Age: 29
End: 2024-07-01
Payer: COMMERCIAL

## 2024-07-01 VITALS
SYSTOLIC BLOOD PRESSURE: 130 MMHG | BODY MASS INDEX: 37.12 KG/M2 | DIASTOLIC BLOOD PRESSURE: 97 MMHG | HEIGHT: 66 IN | WEIGHT: 231 LBS | HEART RATE: 82 BPM

## 2024-07-01 DIAGNOSIS — G40.109 LOCALIZATION-RELATED SYMPTOMATIC EPILEPSY AND EPILEPTIC SYNDROMES WITH SIMPLE PARTIAL SEIZURES, NOT INTRACTABLE, WITHOUT STATUS EPILEPTICUS: ICD-10-CM

## 2024-07-01 DIAGNOSIS — R56.9 GENERALIZED CONVULSIVE SEIZURES: ICD-10-CM

## 2024-07-01 RX ORDER — LACOSAMIDE 200 MG/1
TABLET ORAL
Qty: 60 TABLET | Refills: 5 | Status: SHIPPED | OUTPATIENT
Start: 2024-07-01

## 2024-07-01 RX ORDER — LEVETIRACETAM 750 MG/1
TABLET ORAL
Qty: 360 TABLET | Refills: 3 | Status: SHIPPED | OUTPATIENT
Start: 2024-07-01

## 2024-07-01 NOTE — ASSESSMENT & PLAN NOTE
He is to continue taking Vimpat and lacosamide.  I will see him again in 8 months time for follow-up.  Should he have a recurrent seizure they are to inform me.  Thank you for letting me participate in his care.

## 2024-07-01 NOTE — PROGRESS NOTES
"Chief Complaint  Follow-up (Med check- Vimpat. )    Subjective          Fred Wood is a 28 y.o. male who presents to [facility]  History of Present Illness  28-year-old man here for follow-up of his seizures.  He is not having any seizures since he has been on Vimpat 200 mg twice a day.  He is also on levetiracetam 1500 mg twice a day.  He states that he missed 2 weeks of Vimpat when the pharmacy could not get him the medication when he was taking 150 mg twice a day.  2 seizures at the time.  His father is with him at this time.  Father states that he is not having any seizures.  He did not go to emergency [place] and since had a court date and is under probation.  They would not reschedule it.    Objective   Vital Signs:   /97   Pulse 82   Ht 167.6 cm (65.98\")   Wt 105 kg (231 lb)   BMI 37.30 kg/m²     Physical Exam   He is alert, fluent, phasic, follows commands well.  Heart is regular rhythm normal in rate.        Assessment and Plan  Diagnoses and all orders for this visit:    1. Localization-related symptomatic epilepsy and epileptic syndromes with simple partial seizures, not intractable, without status epilepticus  -     lacosamide (VIMPAT) 200 MG tablet; Take 1 tablet twice a day  Dispense: 60 tablet; Refill: 5    2. Generalized convulsive seizures  Assessment & Plan:  He is to continue taking Vimpat and lacosamide.  I will see him again in 8 months time for follow-up.  Should he have a recurrent seizure they are to inform me.  Thank you for letting me participate in his care.    Orders:  -     lacosamide (VIMPAT) 200 MG tablet; Take 1 tablet twice a day  Dispense: 60 tablet; Refill: 5    Other orders  -     levETIRAcetam (KEPPRA) 750 MG tablet; 2 tablets twice a day  Dispense: 360 tablet; Refill: 3         Total time spent with the patient and coordinating patient care was 30 minutes.    Follow Up  No follow-ups on file.  Patient was given " instructions and counseling regarding his condition or for health maintenance advice. Please see specific information pulled into the AVS if appropriate.

## 2025-03-07 ENCOUNTER — APPOINTMENT (OUTPATIENT)
Dept: GENERAL RADIOLOGY | Facility: HOSPITAL | Age: 30
End: 2025-03-07
Payer: COMMERCIAL

## 2025-03-07 ENCOUNTER — APPOINTMENT (OUTPATIENT)
Dept: CT IMAGING | Facility: HOSPITAL | Age: 30
End: 2025-03-07
Payer: COMMERCIAL

## 2025-03-07 ENCOUNTER — HOSPITAL ENCOUNTER (INPATIENT)
Facility: HOSPITAL | Age: 30
LOS: 2 days | Discharge: LEFT BEFORE DISCHARGE | End: 2025-03-09
Attending: EMERGENCY MEDICINE | Admitting: STUDENT IN AN ORGANIZED HEALTH CARE EDUCATION/TRAINING PROGRAM
Payer: COMMERCIAL

## 2025-03-07 DIAGNOSIS — G40.901 STATUS EPILEPTICUS: Primary | ICD-10-CM

## 2025-03-07 PROBLEM — E87.20 METABOLIC ACIDOSIS: Status: ACTIVE | Noted: 2025-03-07

## 2025-03-07 PROBLEM — J96.01 ACUTE HYPOXIC RESPIRATORY FAILURE: Status: ACTIVE | Noted: 2025-03-07

## 2025-03-07 LAB
ALBUMIN SERPL-MCNC: 5 G/DL (ref 3.5–5.2)
ALBUMIN/GLOB SERPL: 1.6 G/DL
ALP SERPL-CCNC: 105 U/L (ref 39–117)
ALT SERPL W P-5'-P-CCNC: 41 U/L (ref 1–41)
AMPHET+METHAMPHET UR QL: NEGATIVE
AMPHETAMINES UR QL: NEGATIVE
ANION GAP SERPL CALCULATED.3IONS-SCNC: 33 MMOL/L (ref 5–15)
ARTERIAL PATENCY WRIST A: POSITIVE
AST SERPL-CCNC: 32 U/L (ref 1–40)
ATMOSPHERIC PRESS: 734 MMHG
BARBITURATES UR QL SCN: NEGATIVE
BASE EXCESS BLDA CALC-SCNC: -10.8 MMOL/L (ref -2–2)
BASOPHILS # BLD AUTO: 0.14 10*3/MM3 (ref 0–0.2)
BASOPHILS NFR BLD AUTO: 0.5 % (ref 0–1.5)
BDY SITE: ABNORMAL
BENZODIAZ UR QL SCN: NEGATIVE
BILIRUB SERPL-MCNC: <0.2 MG/DL (ref 0–1.2)
BUN SERPL-MCNC: 14 MG/DL (ref 6–20)
BUN/CREAT SERPL: 9.2 (ref 7–25)
BUPRENORPHINE SERPL-MCNC: NEGATIVE NG/ML
CALCIUM SPEC-SCNC: 8.9 MG/DL (ref 8.6–10.5)
CANNABINOIDS SERPL QL: POSITIVE
CHLORIDE SERPL-SCNC: 101 MMOL/L (ref 98–107)
CK SERPL-CCNC: 283 U/L (ref 20–200)
CO2 SERPL-SCNC: 7 MMOL/L (ref 22–29)
COCAINE UR QL: NEGATIVE
CREAT SERPL-MCNC: 1.52 MG/DL (ref 0.76–1.27)
DEPRECATED RDW RBC AUTO: 42.5 FL (ref 37–54)
EGFRCR SERPLBLD CKD-EPI 2021: 63.6 ML/MIN/1.73
EOSINOPHIL # BLD AUTO: 0.05 10*3/MM3 (ref 0–0.4)
EOSINOPHIL NFR BLD AUTO: 0.2 % (ref 0.3–6.2)
ERYTHROCYTE [DISTWIDTH] IN BLOOD BY AUTOMATED COUNT: 12.1 % (ref 12.3–15.4)
ETHANOL BLD-MCNC: <10 MG/DL (ref 0–10)
ETHANOL UR QL: <0.01 %
FENTANYL UR-MCNC: NEGATIVE NG/ML
GLOBULIN UR ELPH-MCNC: 3.1 GM/DL
GLUCOSE BLDC GLUCOMTR-MCNC: 110 MG/DL (ref 70–99)
GLUCOSE SERPL-MCNC: 220 MG/DL (ref 65–99)
HCO3 BLDA-SCNC: 19.8 MMOL/L (ref 22–26)
HCT VFR BLD AUTO: 52.9 % (ref 37.5–51)
HCT VFR BLD CALC: 55 % (ref 38–51)
HEMODILUTION: NO
HGB BLD-MCNC: 16.7 G/DL (ref 13–17.7)
HGB BLDA-MCNC: 18.6 G/DL (ref 12–18)
IMM GRANULOCYTES # BLD AUTO: 0.45 10*3/MM3 (ref 0–0.05)
IMM GRANULOCYTES NFR BLD AUTO: 1.7 % (ref 0–0.5)
INHALED O2 CONCENTRATION: 50 %
LYMPHOCYTES # BLD AUTO: 2.81 10*3/MM3 (ref 0.7–3.1)
LYMPHOCYTES NFR BLD AUTO: 10.4 % (ref 19.6–45.3)
Lab: ABNORMAL
MAGNESIUM SERPL-MCNC: 2.6 MG/DL (ref 1.6–2.6)
MCH RBC QN AUTO: 29.8 PG (ref 26.6–33)
MCHC RBC AUTO-ENTMCNC: 31.6 G/DL (ref 31.5–35.7)
MCV RBC AUTO: 94.3 FL (ref 79–97)
METHADONE UR QL SCN: NEGATIVE
MODALITY: ABNORMAL
MONOCYTES # BLD AUTO: 2.07 10*3/MM3 (ref 0.1–0.9)
MONOCYTES NFR BLD AUTO: 7.6 % (ref 5–12)
NEUTROPHILS NFR BLD AUTO: 21.6 10*3/MM3 (ref 1.7–7)
NEUTROPHILS NFR BLD AUTO: 79.6 % (ref 42.7–76)
NOTIFIED WHO: ABNORMAL
NRBC BLD AUTO-RTO: 0 /100 WBC (ref 0–0.2)
OPIATES UR QL: NEGATIVE
OXYCODONE UR QL SCN: NEGATIVE
PCO2 BLDA: 61.3 MM HG (ref 35–45)
PCP UR QL SCN: NEGATIVE
PEEP RESPIRATORY: 5 CM[H2O]
PH BLDA: 7.12 PH UNITS (ref 7.35–7.45)
PLATELET # BLD AUTO: 376 10*3/MM3 (ref 140–450)
PMV BLD AUTO: 10 FL (ref 6–12)
PO2 BLD: 159 MM[HG] (ref 0–500)
PO2 BLDA: 79.3 MM HG (ref 80–100)
POTASSIUM SERPL-SCNC: 5.1 MMOL/L (ref 3.5–5.2)
PROT SERPL-MCNC: 8.1 G/DL (ref 6–8.5)
RBC # BLD AUTO: 5.61 10*6/MM3 (ref 4.14–5.8)
READ BACK: YES
RESPIRATORY RATE: 18
SAO2 % BLDCOA: 90.2 % (ref 95–99)
SODIUM SERPL-SCNC: 141 MMOL/L (ref 136–145)
TRICYCLICS UR QL SCN: NEGATIVE
VENTILATOR MODE: AC
VT ON VENT VENT: 400 ML
WBC NRBC COR # BLD AUTO: 27.12 10*3/MM3 (ref 3.4–10.8)

## 2025-03-07 PROCEDURE — 80307 DRUG TEST PRSMV CHEM ANLYZR: CPT | Performed by: EMERGENCY MEDICINE

## 2025-03-07 PROCEDURE — 25010000002 PROPOFOL 10 MG/ML EMULSION

## 2025-03-07 PROCEDURE — 94799 UNLISTED PULMONARY SVC/PX: CPT

## 2025-03-07 PROCEDURE — 82803 BLOOD GASES ANY COMBINATION: CPT | Performed by: EMERGENCY MEDICINE

## 2025-03-07 PROCEDURE — 87205 SMEAR GRAM STAIN: CPT | Performed by: EMERGENCY MEDICINE

## 2025-03-07 PROCEDURE — 99223 1ST HOSP IP/OBS HIGH 75: CPT | Performed by: STUDENT IN AN ORGANIZED HEALTH CARE EDUCATION/TRAINING PROGRAM

## 2025-03-07 PROCEDURE — 82077 ASSAY SPEC XCP UR&BREATH IA: CPT | Performed by: EMERGENCY MEDICINE

## 2025-03-07 PROCEDURE — 99222 1ST HOSP IP/OBS MODERATE 55: CPT | Performed by: PSYCHIATRY & NEUROLOGY

## 2025-03-07 PROCEDURE — 25010000002 FENTANYL CITRATE (PF) 50 MCG/ML SOLUTION: Performed by: EMERGENCY MEDICINE

## 2025-03-07 PROCEDURE — 25010000002 LORAZEPAM PER 2 MG: Performed by: PSYCHIATRY & NEUROLOGY

## 2025-03-07 PROCEDURE — 25010000002 LORAZEPAM PER 2 MG

## 2025-03-07 PROCEDURE — 36600 WITHDRAWAL OF ARTERIAL BLOOD: CPT | Performed by: EMERGENCY MEDICINE

## 2025-03-07 PROCEDURE — 94002 VENT MGMT INPAT INIT DAY: CPT

## 2025-03-07 PROCEDURE — 70450 CT HEAD/BRAIN W/O DYE: CPT

## 2025-03-07 PROCEDURE — 5A1935Z RESPIRATORY VENTILATION, LESS THAN 24 CONSECUTIVE HOURS: ICD-10-PCS | Performed by: INTERNAL MEDICINE

## 2025-03-07 PROCEDURE — 25010000002 LEVETRIRACETAM PER 10 MG: Performed by: EMERGENCY MEDICINE

## 2025-03-07 PROCEDURE — 25810000003 SODIUM CHLORIDE 0.9 % SOLUTION: Performed by: INTERNAL MEDICINE

## 2025-03-07 PROCEDURE — 82550 ASSAY OF CK (CPK): CPT | Performed by: PSYCHIATRY & NEUROLOGY

## 2025-03-07 PROCEDURE — 99291 CRITICAL CARE FIRST HOUR: CPT

## 2025-03-07 PROCEDURE — 99291 CRITICAL CARE FIRST HOUR: CPT | Performed by: INTERNAL MEDICINE

## 2025-03-07 PROCEDURE — 85025 COMPLETE CBC W/AUTO DIFF WBC: CPT | Performed by: EMERGENCY MEDICINE

## 2025-03-07 PROCEDURE — 80053 COMPREHEN METABOLIC PANEL: CPT | Performed by: EMERGENCY MEDICINE

## 2025-03-07 PROCEDURE — 82948 REAGENT STRIP/BLOOD GLUCOSE: CPT

## 2025-03-07 PROCEDURE — 71045 X-RAY EXAM CHEST 1 VIEW: CPT

## 2025-03-07 PROCEDURE — 83735 ASSAY OF MAGNESIUM: CPT | Performed by: PSYCHIATRY & NEUROLOGY

## 2025-03-07 PROCEDURE — 25010000002 LEVETRIRACETAM PER 10 MG: Performed by: PSYCHIATRY & NEUROLOGY

## 2025-03-07 PROCEDURE — XX20X89 MONITORING OF BRAIN ELECTRICAL ACTIVITY, COMPUTER-AIDED DETECTION AND NOTIFICATION, NEW TECHNOLOGY GROUP 9: ICD-10-PCS | Performed by: EMERGENCY MEDICINE

## 2025-03-07 PROCEDURE — 51702 INSERT TEMP BLADDER CATH: CPT

## 2025-03-07 PROCEDURE — 25010000002 HEPARIN (PORCINE) PER 1000 UNITS: Performed by: STUDENT IN AN ORGANIZED HEALTH CARE EDUCATION/TRAINING PROGRAM

## 2025-03-07 PROCEDURE — 25010000002 PROPOFOL 10 MG/ML EMULSION: Performed by: EMERGENCY MEDICINE

## 2025-03-07 PROCEDURE — 0BH17EZ INSERTION OF ENDOTRACHEAL AIRWAY INTO TRACHEA, VIA NATURAL OR ARTIFICIAL OPENING: ICD-10-PCS | Performed by: EMERGENCY MEDICINE

## 2025-03-07 PROCEDURE — 87070 CULTURE OTHR SPECIMN AEROBIC: CPT | Performed by: EMERGENCY MEDICINE

## 2025-03-07 RX ORDER — SODIUM CHLORIDE 0.9 % (FLUSH) 0.9 %
10 SYRINGE (ML) INJECTION AS NEEDED
Status: DISCONTINUED | OUTPATIENT
Start: 2025-03-07 | End: 2025-03-09 | Stop reason: HOSPADM

## 2025-03-07 RX ORDER — SODIUM CHLORIDE 0.9 % (FLUSH) 0.9 %
10 SYRINGE (ML) INJECTION EVERY 12 HOURS SCHEDULED
Status: DISCONTINUED | OUTPATIENT
Start: 2025-03-07 | End: 2025-03-09 | Stop reason: HOSPADM

## 2025-03-07 RX ORDER — AMOXICILLIN 250 MG
2 CAPSULE ORAL 2 TIMES DAILY
Status: DISCONTINUED | OUTPATIENT
Start: 2025-03-07 | End: 2025-03-08

## 2025-03-07 RX ORDER — AMOXICILLIN 250 MG
2 CAPSULE ORAL 2 TIMES DAILY
Status: DISCONTINUED | OUTPATIENT
Start: 2025-03-07 | End: 2025-03-07

## 2025-03-07 RX ORDER — LORAZEPAM 2 MG/ML
INJECTION INTRAMUSCULAR
Status: COMPLETED
Start: 2025-03-07 | End: 2025-03-07

## 2025-03-07 RX ORDER — ROCURONIUM BROMIDE 10 MG/ML
INJECTION, SOLUTION INTRAVENOUS
Status: COMPLETED | OUTPATIENT
Start: 2025-03-07 | End: 2025-03-07

## 2025-03-07 RX ORDER — ONDANSETRON 2 MG/ML
4 INJECTION INTRAMUSCULAR; INTRAVENOUS EVERY 6 HOURS PRN
Status: DISCONTINUED | OUTPATIENT
Start: 2025-03-07 | End: 2025-03-08

## 2025-03-07 RX ORDER — POLYETHYLENE GLYCOL 3350 17 G/17G
17 POWDER, FOR SOLUTION ORAL DAILY PRN
Status: DISCONTINUED | OUTPATIENT
Start: 2025-03-07 | End: 2025-03-08

## 2025-03-07 RX ORDER — PROPOFOL 10 MG/ML
VIAL (ML) INTRAVENOUS
Status: COMPLETED
Start: 2025-03-07 | End: 2025-03-07

## 2025-03-07 RX ORDER — LORAZEPAM 2 MG/ML
1 INJECTION INTRAMUSCULAR ONCE
Status: COMPLETED | OUTPATIENT
Start: 2025-03-07 | End: 2025-03-07

## 2025-03-07 RX ORDER — BISACODYL 10 MG
10 SUPPOSITORY, RECTAL RECTAL DAILY PRN
Status: DISCONTINUED | OUTPATIENT
Start: 2025-03-07 | End: 2025-03-07

## 2025-03-07 RX ORDER — BISACODYL 10 MG
10 SUPPOSITORY, RECTAL RECTAL DAILY PRN
Status: DISCONTINUED | OUTPATIENT
Start: 2025-03-07 | End: 2025-03-08

## 2025-03-07 RX ORDER — NITROGLYCERIN 0.4 MG/1
0.4 TABLET SUBLINGUAL
Status: DISCONTINUED | OUTPATIENT
Start: 2025-03-07 | End: 2025-03-09 | Stop reason: HOSPADM

## 2025-03-07 RX ORDER — POLYETHYLENE GLYCOL 3350 17 G/17G
17 POWDER, FOR SOLUTION ORAL DAILY PRN
Status: DISCONTINUED | OUTPATIENT
Start: 2025-03-07 | End: 2025-03-07

## 2025-03-07 RX ORDER — FENTANYL CITRATE 50 UG/ML
100 INJECTION, SOLUTION INTRAMUSCULAR; INTRAVENOUS ONCE
Status: COMPLETED | OUTPATIENT
Start: 2025-03-07 | End: 2025-03-07

## 2025-03-07 RX ORDER — LEVETIRACETAM 500 MG/5ML
1500 INJECTION, SOLUTION, CONCENTRATE INTRAVENOUS ONCE
Status: COMPLETED | OUTPATIENT
Start: 2025-03-07 | End: 2025-03-07

## 2025-03-07 RX ORDER — LORAZEPAM 2 MG/ML
4 INJECTION INTRAMUSCULAR ONCE
Status: COMPLETED | OUTPATIENT
Start: 2025-03-07 | End: 2025-03-07

## 2025-03-07 RX ORDER — BISACODYL 5 MG/1
5 TABLET, DELAYED RELEASE ORAL DAILY PRN
Status: DISCONTINUED | OUTPATIENT
Start: 2025-03-07 | End: 2025-03-08

## 2025-03-07 RX ORDER — BISACODYL 5 MG/1
5 TABLET, DELAYED RELEASE ORAL DAILY PRN
Status: DISCONTINUED | OUTPATIENT
Start: 2025-03-07 | End: 2025-03-07

## 2025-03-07 RX ORDER — LEVETIRACETAM 500 MG/5ML
20 INJECTION, SOLUTION, CONCENTRATE INTRAVENOUS ONCE
Status: COMPLETED | OUTPATIENT
Start: 2025-03-07 | End: 2025-03-07

## 2025-03-07 RX ORDER — SODIUM CHLORIDE 9 MG/ML
100 INJECTION, SOLUTION INTRAVENOUS CONTINUOUS
Status: DISCONTINUED | OUTPATIENT
Start: 2025-03-07 | End: 2025-03-08

## 2025-03-07 RX ORDER — HEPARIN SODIUM 5000 [USP'U]/ML
5000 INJECTION, SOLUTION INTRAVENOUS; SUBCUTANEOUS EVERY 12 HOURS SCHEDULED
Status: DISCONTINUED | OUTPATIENT
Start: 2025-03-07 | End: 2025-03-09 | Stop reason: HOSPADM

## 2025-03-07 RX ORDER — SODIUM CHLORIDE 9 MG/ML
40 INJECTION, SOLUTION INTRAVENOUS AS NEEDED
Status: DISCONTINUED | OUTPATIENT
Start: 2025-03-07 | End: 2025-03-09 | Stop reason: HOSPADM

## 2025-03-07 RX ORDER — ONDANSETRON 4 MG/1
4 TABLET, ORALLY DISINTEGRATING ORAL EVERY 6 HOURS PRN
Status: DISCONTINUED | OUTPATIENT
Start: 2025-03-07 | End: 2025-03-08

## 2025-03-07 RX ORDER — FENTANYL CITRATE-0.9 % NACL/PF 10 MCG/ML
50-300 PLASTIC BAG, INJECTION (ML) INTRAVENOUS
Status: DISCONTINUED | OUTPATIENT
Start: 2025-03-07 | End: 2025-03-08

## 2025-03-07 RX ORDER — FENTANYL CITRATE-0.9 % NACL/PF 10 MCG/ML
PLASTIC BAG, INJECTION (ML) INTRAVENOUS
Status: DISPENSED
Start: 2025-03-07 | End: 2025-03-08

## 2025-03-07 RX ADMIN — PROPOFOL 5 MCG/KG/MIN: 10 INJECTION, EMULSION INTRAVENOUS at 16:37

## 2025-03-07 RX ADMIN — SODIUM CHLORIDE 100 ML/HR: 9 INJECTION, SOLUTION INTRAVENOUS at 23:40

## 2025-03-07 RX ADMIN — ROCURONIUM BROMIDE 100 MG: 10 INJECTION, SOLUTION INTRAVENOUS at 16:29

## 2025-03-07 RX ADMIN — PROPOFOL 20 MCG/KG/MIN: 10 INJECTION, EMULSION INTRAVENOUS at 23:41

## 2025-03-07 RX ADMIN — LORAZEPAM 1 MG: 2 INJECTION INTRAMUSCULAR; INTRAVENOUS at 15:15

## 2025-03-07 RX ADMIN — MUPIROCIN 1 APPLICATION: 20 OINTMENT TOPICAL at 21:50

## 2025-03-07 RX ADMIN — FENTANYL CITRATE 100 MCG: 50 INJECTION, SOLUTION INTRAMUSCULAR; INTRAVENOUS at 20:00

## 2025-03-07 RX ADMIN — LEVETIRACETAM 1500 MG: 100 INJECTION, SOLUTION INTRAVENOUS at 15:17

## 2025-03-07 RX ADMIN — HEPARIN SODIUM 5000 UNITS: 5000 INJECTION INTRAVENOUS; SUBCUTANEOUS at 21:50

## 2025-03-07 RX ADMIN — LORAZEPAM 4 MG: 2 INJECTION INTRAMUSCULAR; INTRAVENOUS at 17:27

## 2025-03-07 RX ADMIN — LORAZEPAM 1 MG: 2 INJECTION INTRAMUSCULAR; INTRAVENOUS at 16:27

## 2025-03-07 RX ADMIN — LEVETIRACETAM 2160 MG: 100 INJECTION, SOLUTION INTRAVENOUS at 17:30

## 2025-03-07 RX ADMIN — Medication 50 MCG/HR: at 19:45

## 2025-03-07 RX ADMIN — LORAZEPAM 1 MG: 2 INJECTION INTRAMUSCULAR at 15:15

## 2025-03-07 RX ADMIN — Medication 10 ML: at 21:50

## 2025-03-07 NOTE — H&P
Patient Care Team:  Provider, No Known as PCP - General    Chief complaint seizure    Subjective     Patient is a 29 y.o. male with history of convulsive seizures and history of hospitalizations for medical noncompliance presents to the emergency department for 4 witnessed convulsive seizures.  Patient was discharged in September from the hospital on lacosamide and Keppra.  His dose of Keppra outpatient is 1500 mg twice daily and he is apparently off of Vimpat now.  Patient had a seizure upon arrival to the ED and received 10 mg of diazepam, 2 mg of IV lorazepam and 1500 mg of Keppra.  He was intubated in the emergency department.    Review of Systems   Pertinent items are noted in HPI    History  Past Medical History:   Diagnosis Date    Seizures      History reviewed. No pertinent surgical history.  History reviewed. No pertinent family history.  Social History     Tobacco Use    Smoking status: Every Day     Current packs/day: 1.00     Types: Cigarettes     Passive exposure: Current    Smokeless tobacco: Never   Vaping Use    Vaping status: Never Used   Substance Use Topics    Alcohol use: Yes    Drug use: Yes     Types: Methamphetamines, Marijuana     Comment: states last meth use was long ago     (Not in a hospital admission)   Allergies:  Patient has no known allergies.    Objective     Vital Signs  Temp:  [96.6 °F (35.9 °C)-98.4 °F (36.9 °C)] 98.4 °F (36.9 °C)  Heart Rate:  [] 124  Resp:  [18-24] 20  BP: (106-171)/() 139/53  FiO2 (%):  [50 %-60 %] 60 %    Physical Exam:      General Appearance:  Alert, cooperative, in no acute distress   Head:  Normocephalic, without obvious abnormality, atraumatic   Eyes:  Lids and lashes normal, conjunctivae and sclerae normal, no icterus, no pallor, corneas clear, PERRLA   Ears:  Ears appear intact with no abnormalities noted   Throat:  No oral lesions, no thrush, oral mucosa moist   Neck:  No adenopathy, supple, trachea midline, no thyromegaly, no carotid  bruit, no JVD   Back:  No kyphosis present, no scoliosis present, no skin lesions, erythema or scars, no tenderness to percussion or palpation, range of motion normal   Lungs:  Clear to auscultation, respirations regular, even and unlabored    Heart:  Regular rhythm and normal rate, normal S1 and S2, no murmur, no gallop, no rub, no click   Chest Wall:  No abnormalities observed   Abdomen:  Normal bowel sounds, no masses, no organomegaly, soft non-tender, non-distended, no guarding, no rebound tenderness   Rectal:  Deferred   Extremities:  Moves all extremities well, no edema, no cyanosis, no redness   Pulses:  Pulses palpable and equal bilaterally   Skin:  No bleeding, bruising or rash   Lymph nodes:  No palpable adenopathy   Neurologic:  Cranial nerves 2 - 12 grossly intact, sensation intact, DTR present and equal bilaterally         Results Review:    I reviewed the patient's new clinical results.  I reviewed the patient's new imaging results and agree with the interpretation.  I reviewed the patient's other test results and agree with the interpretation  I personally viewed and interpreted the patient's EKG/Telemetry data    Assessment & Plan       Convulsive status epilepticus    Acute hypoxic respiratory failure    Metabolic acidosis      Admit to hospitalist service  ICU monitoring  Wean sedation per protocol   ventilator management per intensivist  Daily labs  Neurochecks  Seizure precautions  Appreciate neurology input  Full code        Jose Keith MD  03/07/25  18:43 EST

## 2025-03-07 NOTE — CONSULTS
"[facility]  [facility] Tele[facility] Consult Services    Stat Consult    Patient Name:   Fred Wood  YOB: 1995  Identification Number:   MRN - [id number]  Date of Service:   03/07/2025 16:43:24    Diagnosis:        G40.401 - Other generalized epilepsy, not intractable, with status epilepticus (HCC)    Impression  This is a 30 y/o man with epilepsy who was last hospitalized for generalized convulsive seizures 9/2024 when they were attributed to marijuana use and medication noncompliance. He was discharged on lacosamide and levetiracetam.  Today, by EMS report, he had 4 witnessed generalized convulsive seizures. On arrival to the ED he had another seizure. He has received 10 mg diazepam, 2 mg IV lorazepam and 1500 mg IV Keppra. His outpatient dose of Keppra is 1500 mg BID and he no longer takes Vimpat.  He was normothermic on arrival to this ED and is currently 35.8 C, on propofol at 5 mcg/kg/min.    #1 Convulsive status epilepticus    I have given an additional 4 mg IV lorazepam and 20 mg/kg IV levetiracetam.  If it is confirmed that he has been compliant with Keppra, increase the outpatient maintenance dose of Keppra to 2 g BID.  I have added on a magnesium and CK to the labs. Please follow the CK and if elevated trend until it plateaus, giving IV fluids to flush the kidneys if the initial value is over 1000. Replete magnesium if < 2.  After lorazepam and levetiracetam have been administered, and Ceribell \"confirms\" absence of status epilepticus, discontinue propofol and try to obtain a neurologic examination.  This was not refractory status epilepticus so the patient does not require anesthetics for any duration of time.  If he does not recover consciousness within a few hours I would obtain a head CT.      Recommendations:  Our recommendations are outlined below.    Nursing Recommendations :  Maintain Euglycemia and Euthermia  Neuro checks q1-2 hrs during ICU stay if critical  Once " stable neuro checks q 4hrs    Seizure precautions :  Seizure precautions including no driving for state mandated time frame were discussed with patient with clear understanding    Disposition :  Neurology will follow        ----------------------------------------------------------------------------------------------------      Advanced Imaging:  Advanced Imaging Deferred because:    Stroke not suspected with clinical presentation and exam        Metrics:  Dispatch Time: 03/07/2025 16:42:09  Callback Response Time: 03/07/2025 16:44:20    Primary Provider Notified of Diagnostic Impression and Management Plan on: 03/07/2025 17:29:22      CT HEAD:  Image Unavailable - Discussed with Radiologist / Reviewed Radiology Report  not performed      Labs  reviewed  sodium, potassium, and calcium are normal; there is no magnesium  WBC 27K        ----------------------------------------------------------------------------------------------------    Chief Complaint:  flurry of seizures    History of Present Illness:  Patient is a 29 year old Male.  This is a 28 y/o man with epilepsy who was last hospitalized for generalized convulsive seizures 9/2024 when they were attributed to marijuana use and medication noncompliance. He was discharged on lacosamide and levetiracetam.  Today, by EMS report, he had 4 witnessed generalized convulsive seizures. On arrival to the ED he had another seizure. He has received 10 mg diazepam, 2 mg IV lorazepam and 1500 mg IV Keppra. His outpatient dose of Keppra is 1500 mg BID and he no longer takes Vimpat.  He was normothermic on arrival to this ED and is currently 35.8 C, on propofol at 5 mcg/kg/min.          Medications:    No Anticoagulant use   No Antiplatelet use  Reviewed EMR for current medications    Allergies:   Reviewed    Social History:  Patient Is:   Drug Use: Yes    Family History:    There is no family history of premature cerebrovascular disease pertinent to this  consultation    ROS :  14 Points Review of Systems was performed and was negative except mentioned in HPI.    Past Surgical History:  There Is No Surgical History Contributory To Today’s Visit      Examination:  BP(171/93), Pulse(55), Blood Glucose(222)    Spoke with : ED Physician    Coma Exam:    Ventilator:  Yes  Sedated: Yes  Paralyzed: Yes  Breathes spontaneously above ventilator rate: No    Responds to Voice:  No    Responds to Sternal rub:  No    Withdraws limb from painful stimulation:  No    Spontaneous limb movement:   Right arm: No  Left arm: No  Right leg: No  Left leg: No    Pupils:  Equal and Reactive    Corneal Reflexes: not tested    Oculocephalic Reflexes:  Normal    Cough/Gag Reflexes: not tested    Free Text:  no adventitious movements.        This consult was conducted in real time using interactive audio and video technology. Patient was informed of the technology being used for this visit and agreed to proceed. Patient located in hospital and provider located at home/office setting.    Patient is being evaluated for possible acute neurologic impairment and high probability of imminent or life - threatening deterioration.I spent total of 39 minutes providing care to this patient, including time for face to face visit via telemedicine, review of medical records, imaging studies and discussion of findings with providers, the patient and / or family.      Dr Jazzy Segura      [facility]  For Inpatient follow-up with [facility] physician please call [facility] at [phone]. As we are not an outpatient service for any post hospital discharge needs please contact the hospital for assistance.    If you have any questions for the [facility] physicians or need to reconsult for clinical or diagnostic changes please contact us via [facility] at [phone].

## 2025-03-07 NOTE — ED PROVIDER NOTES
Time: 3:11 PM EST  Date of encounter:  3/7/2025  Independent Historian/Clinical History and Information was obtained by:   EMS    History is limited by: Altered Mental Status    Chief Complaint: Seizures      History of Present Illness:  Patient is a 29 y.o. year old male who presents to the emergency department for evaluation of seizures.  Patient has a history of seizures daily and sees Dr. Marquis, neurology.  Per EMS they were called for multiple seizures approximately 6 this morning. Prior to arrival patient was given 10 mg of Valium and had another seizure upon arrival which quickly abated.  Seizure is tonic-clonic in nature.      Patient Care Team  Primary Care Provider: Provider, Liliam Known    Past Medical History:     No Known Allergies  Past Medical History:   Diagnosis Date    Seizures      History reviewed. No pertinent surgical history.  History reviewed. No pertinent family history.    Home Medications:  Prior to Admission medications    Medication Sig Start Date End Date Taking? Authorizing Provider   fluticasone (FLONASE) 50 MCG/ACT nasal spray 1 spray into the nostril(s) as directed by provider Daily.  Patient not taking: Reported on 7/1/2024 2/8/24   Paulino Cardoza MD   gabapentin (NEURONTIN) 300 MG capsule Take 1 capsule by mouth 2 (Two) Times a Day.  Patient not taking: Reported on 7/1/2024    Paulino Cardoza MD   lacosamide (VIMPAT) 200 MG tablet Take 1 tablet twice a day 7/1/24   Vinay Marquis MD   levETIRAcetam (KEPPRA) 750 MG tablet 2 tablets twice a day 7/1/24   Vinay Marquis MD   omeprazole (priLOSEC) 40 MG capsule Take 1 capsule by mouth Daily.    Paulino Cardoza MD        Social History:   Social History     Tobacco Use    Smoking status: Every Day     Current packs/day: 1.00     Types: Cigarettes     Passive exposure: Current    Smokeless tobacco: Never   Vaping Use    Vaping status: Never Used   Substance Use Topics    Alcohol use: Yes    Drug  "use: Yes     Types: Methamphetamines, Marijuana     Comment: states last meth use was long ago         Review of Systems:  Review of Systems   Neurological:  Positive for seizures.        Physical Exam:  /70 (BP Location: Left arm, Patient Position: Lying)   Pulse 73   Temp 98.6 °F (37 °C) (Oral)   Resp 15   Ht 177.8 cm (70\")   Wt 108 kg (237 lb 11.2 oz)   SpO2 95%   BMI 34.11 kg/m²     Physical Exam  Vitals and nursing note reviewed.   Constitutional:       General: He is not in acute distress.     Comments: Patient actively seizing, tonic-clonic activity   HENT:      Head: Normocephalic and atraumatic.   Eyes:      Extraocular Movements: Extraocular movements intact.   Cardiovascular:      Rate and Rhythm: Regular rhythm. Tachycardia present.   Pulmonary:      Effort: Pulmonary effort is normal. No respiratory distress.      Breath sounds: Normal breath sounds.   Abdominal:      Palpations: Abdomen is soft.   Skin:     General: Skin is warm and dry.   Neurological:      Comments: Patient actively seizing                    Medical Decision Making:      Comorbidities that affect care:    Seizures, Substance Abuse    External Notes reviewed:    Previous Clinic Note: Last clinic note is from 7/1/2024 when patient saw Dr. Marquis, neurology.  At that time patient was on Keppra 1500 mg twice daily and Vimpat 200 mg twice daily    I also reviewed the discharge note from [facility] on 9[facility]15/20[facility] after admission for seizures.  Patient had been transferred from [facility].  There was concern the patient had illicit drugs and likely noncompliance with his seizure meds        The following orders were placed and all results were independently analyzed by me:  Orders Placed This Encounter   Procedures    Respiratory Culture - Sputum, ET Suction    XR Chest 1 View    CT Head Without Contrast    Comprehensive Metabolic Panel    Urine Drug Screen - Urine, Clean Catch    Ethanol    CBC Auto Differential "    Fentanyl, Urine - Urine, Clean Catch    Blood Gas, Arterial -    Magnesium    CK    Renal Function Panel    Magnesium    Magnesium    CBC (No Diff)    Procalcitonin    Lactic Acid, Plasma    CK    CBC Auto Differential    Phosphorus    Nasogastric tube insertion    Insert Indwelling Urinary Catheter    Height & Weight    Place Sequential Compression Device    Inpatient Pulmonology Consult    Extubation    POC Glucose Once    POC Glucose Once    Insert Peripheral IV    Inpatient Admission    CBC & Differential    Extra Tubes    Lavender Top    CBC & Differential       Medications Given in the Emergency Department:  Medications   fentaNYL citrate 1-0.9 MG/100ML-% infusion  - ADS Override Pull (has no administration in time range)   levETIRAcetam (KEPPRA) injection 1,500 mg (1,500 mg Intravenous Given 3/7/25 1517)   LORazepam (ATIVAN) injection 1 mg (1 mg Intravenous Given 3/7/25 1515)   LORazepam (ATIVAN) 2 MG/ML injection  - ADS Override Pull (1 mg  Given 3/7/25 1627)   rocuronium (ZEMURON) injection (100 mg Intravenous Given 3/7/25 1629)   LORazepam (ATIVAN) injection 4 mg (4 mg Intravenous Given 3/7/25 1727)   levETIRAcetam (KEPPRA) injection 2,160 mg (2,160 mg Intravenous Given 3/7/25 1730)   fentaNYL citrate (PF) (SUBLIMAZE) injection 100 mcg (100 mcg Intravenous Given 3/7/25 2000)   Midazolam HCl (PF) (VERSED) injection 4 mg (4 mg Intravenous Given 3/8/25 0133)   levETIRAcetam (KEPPRA) injection 500 mg (500 mg Intravenous Given 3/8/25 1142)   haloperidol lactate (HALDOL) injection 4 mg (4 mg Intravenous Given 3/8/25 1210)   Sodium Phosphates-Dextrose IVPB 15 mmol (15 mmol Intravenous Given 3/9/25 0744)        ED Course:         Labs:    Lab Results (last 24 hours)       ** No results found for the last 24 hours. **             Imaging:    No Radiology Exams Resulted Within Past 24 Hours      Differential Diagnosis and Discussion:    Seizure: Differential diagnosis includes but is not limited to meningitis,  hypoglycemia, electrolyte abnormalities, intracranial hemorrhage, toxin induced, and pseudoseizure.    PROCEDURES:    Labs were collected in the emergency department and all labs were reviewed and interpreted by me.  X-ray were performed in the emergency department and all X-ray impressions were independently interpreted by me.  CT scan was performed in the emergency department and the CT scan radiology impression was interpreted by me.    No orders to display       Procedures    MDM     On arrival patient was having seizure and was given 1 of Ativan given the 1500 mg dose of Keppra IV.  Subsequently patient had another seizure was given another dose of Ativan.  At this point to protect the patient's airway he was intubated.  A teleneurology consult was ordered.      Total Critical Care time of 40 minutes. Total critical care time documented does not include time spent on separately billed procedures for services of nurses or physician assistants. I personally saw and examined the patient. I have reviewed all diagnostic interpretations and treatment plans as written. I was present for the key portions of any procedures performed and the inclusive time noted in any critical care statement. Critical care time includes patient management by me, time spent at the patients bedside,  time to review lab and imaging results, discussing patient care, documentation in the medical record, and time spent with family or caregiver.          Patient Care Considerations:    SEPSIS was considered but is NOT present in the emergency department as SIRS criteria is not present.      Consultants/Shared Management Plan:    Patient evaluated by Dr. Segura, teleneurology, who will continue to consult.    Patient discussed with , hospitalist, who will admit the patient    Social Determinants of Health:    Patient has presented with family members who are responsible, reliable and will ensure follow up care.      Disposition and  Care Coordination:    Admit:   Through independent evaluation of the patient's history, physical, and imperical data, the patient meets criteria for inpatient admission to the hospital.        Final diagnoses:   Status epilepticus        ED Disposition       ED Disposition   Decision to Admit    Condition   --    Comment   Level of Care: Critical Care [6]   Diagnosis: Convulsive status epilepticus [[id number]]   Certification: I Certify That Inpatient Hospital Services Are Medically Necessary For Greater Than 2 Midnights                 This medical record created using voice recognition software.             Mihir Carpenter DO  03/14/25 1516       Mihir Carpenter,   03/14/25 1547

## 2025-03-08 LAB
ALBUMIN SERPL-MCNC: 4.4 G/DL (ref 3.5–5.2)
ANION GAP SERPL CALCULATED.3IONS-SCNC: 14.5 MMOL/L (ref 5–15)
BUN SERPL-MCNC: 13 MG/DL (ref 6–20)
BUN/CREAT SERPL: 10.2 (ref 7–25)
CALCIUM SPEC-SCNC: 8.9 MG/DL (ref 8.6–10.5)
CHLORIDE SERPL-SCNC: 106 MMOL/L (ref 98–107)
CO2 SERPL-SCNC: 18.5 MMOL/L (ref 22–29)
CREAT SERPL-MCNC: 1.28 MG/DL (ref 0.76–1.27)
DEPRECATED RDW RBC AUTO: 42.5 FL (ref 37–54)
EGFRCR SERPLBLD CKD-EPI 2021: 78.2 ML/MIN/1.73
ERYTHROCYTE [DISTWIDTH] IN BLOOD BY AUTOMATED COUNT: 12.8 % (ref 12.3–15.4)
GLUCOSE BLDC GLUCOMTR-MCNC: 112 MG/DL (ref 70–99)
GLUCOSE SERPL-MCNC: 116 MG/DL (ref 65–99)
HCT VFR BLD AUTO: 48.1 % (ref 37.5–51)
HGB BLD-MCNC: 15.3 G/DL (ref 13–17.7)
MAGNESIUM SERPL-MCNC: 2.5 MG/DL (ref 1.6–2.6)
MCH RBC QN AUTO: 29 PG (ref 26.6–33)
MCHC RBC AUTO-ENTMCNC: 31.8 G/DL (ref 31.5–35.7)
MCV RBC AUTO: 91.1 FL (ref 79–97)
PHOSPHATE SERPL-MCNC: 4.4 MG/DL (ref 2.5–4.5)
PLATELET # BLD AUTO: 295 10*3/MM3 (ref 140–450)
PMV BLD AUTO: 10.3 FL (ref 6–12)
POTASSIUM SERPL-SCNC: 4.6 MMOL/L (ref 3.5–5.2)
PROCALCITONIN SERPL-MCNC: 0.21 NG/ML (ref 0–0.25)
RBC # BLD AUTO: 5.28 10*6/MM3 (ref 4.14–5.8)
SODIUM SERPL-SCNC: 139 MMOL/L (ref 136–145)
WBC NRBC COR # BLD AUTO: 21.03 10*3/MM3 (ref 3.4–10.8)
WHOLE BLOOD HOLD SPECIMEN: NORMAL

## 2025-03-08 PROCEDURE — 25810000003 LACTATED RINGERS PER 1000 ML: Performed by: PHYSICIAN ASSISTANT

## 2025-03-08 PROCEDURE — 80069 RENAL FUNCTION PANEL: CPT | Performed by: INTERNAL MEDICINE

## 2025-03-08 PROCEDURE — 94799 UNLISTED PULMONARY SVC/PX: CPT

## 2025-03-08 PROCEDURE — 25010000002 MIDAZOLAM-SODIUM CHLORIDE 1 MG/ML 100ML NS 100-0.9 MG/100ML-% SOLUTION: Performed by: STUDENT IN AN ORGANIZED HEALTH CARE EDUCATION/TRAINING PROGRAM

## 2025-03-08 PROCEDURE — 25010000002 PROPOFOL 10 MG/ML EMULSION: Performed by: EMERGENCY MEDICINE

## 2025-03-08 PROCEDURE — 25010000002 HEPARIN (PORCINE) PER 1000 UNITS: Performed by: STUDENT IN AN ORGANIZED HEALTH CARE EDUCATION/TRAINING PROGRAM

## 2025-03-08 PROCEDURE — 99232 SBSQ HOSP IP/OBS MODERATE 35: CPT | Performed by: STUDENT IN AN ORGANIZED HEALTH CARE EDUCATION/TRAINING PROGRAM

## 2025-03-08 PROCEDURE — 99291 CRITICAL CARE FIRST HOUR: CPT | Performed by: INTERNAL MEDICINE

## 2025-03-08 PROCEDURE — 94003 VENT MGMT INPAT SUBQ DAY: CPT

## 2025-03-08 PROCEDURE — 99233 SBSQ HOSP IP/OBS HIGH 50: CPT | Performed by: PSYCHIATRY & NEUROLOGY

## 2025-03-08 PROCEDURE — 25010000002 LORAZEPAM PER 2 MG: Performed by: INTERNAL MEDICINE

## 2025-03-08 PROCEDURE — 82948 REAGENT STRIP/BLOOD GLUCOSE: CPT

## 2025-03-08 PROCEDURE — 25010000002 MIDAZOLAM PER 1MG: Performed by: STUDENT IN AN ORGANIZED HEALTH CARE EDUCATION/TRAINING PROGRAM

## 2025-03-08 PROCEDURE — 83735 ASSAY OF MAGNESIUM: CPT | Performed by: INTERNAL MEDICINE

## 2025-03-08 PROCEDURE — C9254 INJECTION, LACOSAMIDE: HCPCS | Performed by: INTERNAL MEDICINE

## 2025-03-08 PROCEDURE — 85027 COMPLETE CBC AUTOMATED: CPT | Performed by: PHYSICIAN ASSISTANT

## 2025-03-08 PROCEDURE — 25010000002 LEVETRIRACETAM PER 10 MG: Performed by: PHYSICIAN ASSISTANT

## 2025-03-08 PROCEDURE — 25010000002 HALOPERIDOL LACTATE PER 5 MG

## 2025-03-08 PROCEDURE — 84145 PROCALCITONIN (PCT): CPT | Performed by: PHYSICIAN ASSISTANT

## 2025-03-08 PROCEDURE — 25010000002 LACOSAMIDE 200 MG/20ML SOLUTION: Performed by: INTERNAL MEDICINE

## 2025-03-08 RX ORDER — POLYETHYLENE GLYCOL 3350 17 G/17G
17 POWDER, FOR SOLUTION ORAL DAILY PRN
Status: DISCONTINUED | OUTPATIENT
Start: 2025-03-08 | End: 2025-03-08

## 2025-03-08 RX ORDER — AMOXICILLIN 250 MG
2 CAPSULE ORAL 2 TIMES DAILY
Status: DISCONTINUED | OUTPATIENT
Start: 2025-03-08 | End: 2025-03-08

## 2025-03-08 RX ORDER — LEVETIRACETAM 500 MG/5ML
1000 INJECTION, SOLUTION, CONCENTRATE INTRAVENOUS EVERY 12 HOURS SCHEDULED
Status: DISCONTINUED | OUTPATIENT
Start: 2025-03-08 | End: 2025-03-08

## 2025-03-08 RX ORDER — LORAZEPAM 2 MG/ML
2 INJECTION INTRAMUSCULAR
Status: DISCONTINUED | OUTPATIENT
Start: 2025-03-08 | End: 2025-03-08

## 2025-03-08 RX ORDER — DEXMEDETOMIDINE HYDROCHLORIDE 4 UG/ML
.2-1.5 INJECTION, SOLUTION INTRAVENOUS
Status: DISCONTINUED | OUTPATIENT
Start: 2025-03-08 | End: 2025-03-09 | Stop reason: HOSPADM

## 2025-03-08 RX ORDER — LACOSAMIDE 10 MG/ML
200 INJECTION, SOLUTION INTRAVENOUS 2 TIMES DAILY
Status: DISCONTINUED | OUTPATIENT
Start: 2025-03-08 | End: 2025-03-09 | Stop reason: HOSPADM

## 2025-03-08 RX ORDER — BISACODYL 10 MG
10 SUPPOSITORY, RECTAL RECTAL DAILY PRN
Status: DISCONTINUED | OUTPATIENT
Start: 2025-03-08 | End: 2025-03-08

## 2025-03-08 RX ORDER — ONDANSETRON 4 MG/1
4 TABLET, ORALLY DISINTEGRATING ORAL EVERY 6 HOURS PRN
Status: DISCONTINUED | OUTPATIENT
Start: 2025-03-08 | End: 2025-03-09 | Stop reason: HOSPADM

## 2025-03-08 RX ORDER — MIDAZOLAM HYDROCHLORIDE 1 MG/ML
1-10 INJECTION, SOLUTION INTRAVENOUS
Status: DISCONTINUED | OUTPATIENT
Start: 2025-03-08 | End: 2025-03-08

## 2025-03-08 RX ORDER — ONDANSETRON 4 MG/1
4 TABLET, ORALLY DISINTEGRATING ORAL EVERY 6 HOURS PRN
Status: DISCONTINUED | OUTPATIENT
Start: 2025-03-08 | End: 2025-03-08

## 2025-03-08 RX ORDER — AMOXICILLIN 250 MG
2 CAPSULE ORAL 2 TIMES DAILY
Status: DISCONTINUED | OUTPATIENT
Start: 2025-03-08 | End: 2025-03-09 | Stop reason: HOSPADM

## 2025-03-08 RX ORDER — LEVETIRACETAM 500 MG/5ML
1500 INJECTION, SOLUTION, CONCENTRATE INTRAVENOUS EVERY 12 HOURS SCHEDULED
Status: DISCONTINUED | OUTPATIENT
Start: 2025-03-08 | End: 2025-03-09 | Stop reason: HOSPADM

## 2025-03-08 RX ORDER — HALOPERIDOL 5 MG/ML
4 INJECTION INTRAMUSCULAR ONCE
Status: COMPLETED | OUTPATIENT
Start: 2025-03-08 | End: 2025-03-08

## 2025-03-08 RX ORDER — ONDANSETRON 2 MG/ML
4 INJECTION INTRAMUSCULAR; INTRAVENOUS EVERY 6 HOURS PRN
Status: DISCONTINUED | OUTPATIENT
Start: 2025-03-08 | End: 2025-03-09 | Stop reason: HOSPADM

## 2025-03-08 RX ORDER — ONDANSETRON 2 MG/ML
4 INJECTION INTRAMUSCULAR; INTRAVENOUS EVERY 6 HOURS PRN
Status: DISCONTINUED | OUTPATIENT
Start: 2025-03-08 | End: 2025-03-08

## 2025-03-08 RX ORDER — MIDAZOLAM HYDROCHLORIDE 2 MG/2ML
4 INJECTION, SOLUTION INTRAMUSCULAR; INTRAVENOUS ONCE
Status: COMPLETED | OUTPATIENT
Start: 2025-03-08 | End: 2025-03-08

## 2025-03-08 RX ORDER — BISACODYL 5 MG/1
5 TABLET, DELAYED RELEASE ORAL DAILY PRN
Status: DISCONTINUED | OUTPATIENT
Start: 2025-03-08 | End: 2025-03-08

## 2025-03-08 RX ORDER — BISACODYL 10 MG
10 SUPPOSITORY, RECTAL RECTAL DAILY PRN
Status: DISCONTINUED | OUTPATIENT
Start: 2025-03-08 | End: 2025-03-09 | Stop reason: HOSPADM

## 2025-03-08 RX ORDER — LORAZEPAM 2 MG/ML
2 INJECTION INTRAMUSCULAR
Status: DISCONTINUED | OUTPATIENT
Start: 2025-03-08 | End: 2025-03-09 | Stop reason: HOSPADM

## 2025-03-08 RX ORDER — LEVETIRACETAM 500 MG/5ML
500 INJECTION, SOLUTION, CONCENTRATE INTRAVENOUS ONCE
Status: COMPLETED | OUTPATIENT
Start: 2025-03-08 | End: 2025-03-08

## 2025-03-08 RX ORDER — HALOPERIDOL 5 MG/ML
INJECTION INTRAMUSCULAR
Status: COMPLETED
Start: 2025-03-08 | End: 2025-03-08

## 2025-03-08 RX ORDER — SODIUM CHLORIDE, SODIUM LACTATE, POTASSIUM CHLORIDE, CALCIUM CHLORIDE 600; 310; 30; 20 MG/100ML; MG/100ML; MG/100ML; MG/100ML
100 INJECTION, SOLUTION INTRAVENOUS CONTINUOUS
Status: DISCONTINUED | OUTPATIENT
Start: 2025-03-08 | End: 2025-03-09 | Stop reason: HOSPADM

## 2025-03-08 RX ORDER — POLYETHYLENE GLYCOL 3350 17 G/17G
17 POWDER, FOR SOLUTION ORAL DAILY PRN
Status: DISCONTINUED | OUTPATIENT
Start: 2025-03-08 | End: 2025-03-09 | Stop reason: HOSPADM

## 2025-03-08 RX ADMIN — PROPOFOL 35 MCG/KG/MIN: 10 INJECTION, EMULSION INTRAVENOUS at 04:25

## 2025-03-08 RX ADMIN — MUPIROCIN 1 APPLICATION: 20 OINTMENT TOPICAL at 08:16

## 2025-03-08 RX ADMIN — Medication 200 MCG/HR: at 05:27

## 2025-03-08 RX ADMIN — LACOSAMIDE 200 MG: 10 INJECTION INTRAVENOUS at 09:59

## 2025-03-08 RX ADMIN — SENNOSIDES AND DOCUSATE SODIUM 2 TABLET: 50; 8.6 TABLET ORAL at 08:16

## 2025-03-08 RX ADMIN — MIDAZOLAM HYDROCHLORIDE 4 MG: 1 INJECTION, SOLUTION INTRAMUSCULAR; INTRAVENOUS at 01:33

## 2025-03-08 RX ADMIN — Medication 200 MCG/HR: at 02:24

## 2025-03-08 RX ADMIN — SODIUM CHLORIDE, SODIUM LACTATE, POTASSIUM CHLORIDE, AND CALCIUM CHLORIDE 100 ML/HR: .6; .31; .03; .02 INJECTION, SOLUTION INTRAVENOUS at 13:12

## 2025-03-08 RX ADMIN — LORAZEPAM 2 MG: 2 INJECTION INTRAMUSCULAR; INTRAVENOUS at 11:49

## 2025-03-08 RX ADMIN — LEVETIRACETAM 500 MG: 100 INJECTION, SOLUTION INTRAVENOUS at 11:42

## 2025-03-08 RX ADMIN — LORAZEPAM 0.5 MG: 2 INJECTION INTRAMUSCULAR; INTRAVENOUS at 09:14

## 2025-03-08 RX ADMIN — MUPIROCIN 1 APPLICATION: 20 OINTMENT TOPICAL at 20:56

## 2025-03-08 RX ADMIN — LEVETIRACETAM 1500 MG: 100 INJECTION, SOLUTION INTRAVENOUS at 21:07

## 2025-03-08 RX ADMIN — Medication 10 ML: at 08:18

## 2025-03-08 RX ADMIN — HALOPERIDOL LACTATE 4 MG: 5 INJECTION, SOLUTION INTRAMUSCULAR at 12:10

## 2025-03-08 RX ADMIN — DEXMEDETOMIDINE HYDROCHLORIDE 0.9 MCG/KG/HR: 4 INJECTION, SOLUTION INTRAVENOUS at 16:42

## 2025-03-08 RX ADMIN — HALOPERIDOL 4 MG: 5 INJECTION INTRAMUSCULAR at 12:10

## 2025-03-08 RX ADMIN — DEXMEDETOMIDINE HYDROCHLORIDE 0.9 MCG/KG/HR: 4 INJECTION, SOLUTION INTRAVENOUS at 20:54

## 2025-03-08 RX ADMIN — HEPARIN SODIUM 5000 UNITS: 5000 INJECTION INTRAVENOUS; SUBCUTANEOUS at 20:56

## 2025-03-08 RX ADMIN — HEPARIN SODIUM 5000 UNITS: 5000 INJECTION INTRAVENOUS; SUBCUTANEOUS at 08:16

## 2025-03-08 RX ADMIN — LORAZEPAM 0.5 MG: 2 INJECTION INTRAMUSCULAR; INTRAVENOUS at 09:13

## 2025-03-08 RX ADMIN — MIDAZOLAM HYDROCHLORIDE 1 MG/HR: 1 INJECTION, SOLUTION INTRAVENOUS at 02:00

## 2025-03-08 RX ADMIN — Medication 10 ML: at 21:11

## 2025-03-08 RX ADMIN — DEXMEDETOMIDINE HYDROCHLORIDE 0.2 MCG/KG/HR: 4 INJECTION, SOLUTION INTRAVENOUS at 11:04

## 2025-03-08 RX ADMIN — LACOSAMIDE 200 MG: 10 INJECTION INTRAVENOUS at 20:55

## 2025-03-08 RX ADMIN — LEVETIRACETAM 1000 MG: 100 INJECTION, SOLUTION INTRAVENOUS at 09:59

## 2025-03-08 NOTE — PROGRESS NOTES
[facility]   Hospitalist Progress Note  Date: 3/8/2025  Patient Name: Fred Wood  : 1995  MRN: [id number]  Date of admission: 3/7/2025  Room/Bed: [facility] [place]      Subjective   Subjective     Chief Complaint:   Chief Complaint   Patient presents with    Seizures     EMS from home, seizures on keppra 1500mg/daily       Summary: 29-year-old male being managed for convulsive status epilepticus.  He has a history of epilepsy and initially was presenting with witnessed tonic-clonic seizures.  Had multiple seizures prior to arrival, transferred here via EMS, had a seizure on arrival to the emergency department and was intubated and transferred to the ICU.  Apparently was on Keppra, off Vimpat prior to this.    Interval Followup: Patient extubated this morning.  Doing well, somnolent for me but may be postictal somewhat.  Neurology following, continuing Keppra and increased dose along with Vimpat 200 mg twice daily.  Monitoring for recurrence of seizures to see if we need to adjust medication regimen.          Objective   Objective     Vitals:   Temp:  [96.6 °F (35.9 °C)-99.5 °F (37.5 °C)] 99.5 °F (37.5 °C)  Heart Rate:  [] 85  Resp:  [18-20] 20  BP: ()/() 107/51  Flow (L/min) (Oxygen Therapy):  [45-50] 45  FiO2 (%):  [30 %-60 %] 30 %    Physical Exam   General: Somnolent, in no acute distress  HENT: Atraumatic, normocephalic.  Airvo in place   eyes: Closed   Cardiovascular: Regular rate and rhythm on monitor, no murmurs   Pulmonary: CTA bilaterally; no wheezes; no respiratory distress    Result Review    Result Review:  I have personally reviewed these results:  [x]  Laboratory      Lab 25  0249 25  1529   WBC 21.03* 27.12*   HEMOGLOBIN 15.3 16.7   HEMATOCRIT 48.1 52.9*   PLATELETS 295 376   NEUTROS ABS  --  21.60*   IMMATURE GRANS (ABS)  --  0.45*   LYMPHS ABS  --  2.81   MONOS ABS  --  2.07*   EOS ABS  --  0.05   MCV 91.1 94.3   PROCALCITONIN 0.21  --          Lab 25  0474  03/07/25  1529   SODIUM 139 141   POTASSIUM 4.6 5.1   CHLORIDE 106 101   CO2 18.5* 7.0*   ANION GAP 14.5 33.0*   BUN 13 14   CREATININE 1.28* 1.52*   EGFR 78.2 63.6   GLUCOSE 116* 220*   CALCIUM 8.9 8.9   MAGNESIUM 2.5 2.6   PHOSPHORUS 4.4  --          Lab 03/08/25  0249 03/07/25  1529   TOTAL PROTEIN  --  8.1   ALBUMIN 4.4 5.0   GLOBULIN  --  3.1   ALT (SGPT)  --  41   AST (SGOT)  --  32   BILIRUBIN  --  <0.2   ALK PHOS  --  105                     Lab 03/07/25  1719   PH, ARTERIAL 7.118*   PCO2, ARTERIAL 61.3*   PO2 ART 79.3*   O2 SATURATION ART 90.2*   FIO2 50   HCO3 ART 19.8*   BASE EXCESS ART -10.8*     Brief Urine Lab Results       None          [x]  Microbiology   Microbiology Results (last 10 days)       Procedure Component Value - Date/Time    Respiratory Culture - Sputum, ET Suction [[id number]] Collected: 03/07/25 2100    Lab Status: Preliminary result Specimen: Sputum from ET Suction Updated: 03/07/25 2126     Gram Stain Less than 10 Epithelial cells per low power field      Greater than 20 WBCs per low power field      Rare (1+) Gram positive cocci in pairs, chains and clusters      Occasional Gram positive bacilli          [x]  Radiology  CT Head Without Contrast  Result Date: 3/7/2025  Impression: No acute intracranial pathology. Electronically Signed: Jesús Forrest MD  3/7/2025 8:54 PM EST  Workstation ID: [id number]    XR Chest 1 View  Result Date: 3/7/2025  Impression: 1.Endotracheal tube tip appears superior to the medial clavicles, estimated to be 8 cm above the oly. Tube could be advanced by an additional 3 to 4 cm for more optimal positioning. 2.Enteric tube extends into the left upper quadrant, tip beyond the margins of this exam. 3.New mild bibasilar opacities which could be related to atelectasis or pneumonia. Results were called to the ordering provider by Dr. Enamorado at 3/7/2025 4:51 PM EST. Electronically Signed: Fransisco Enamorado  3/7/2025 4:51 PM EST  Workstation ID: [id number]    []   EKG/Telemetry   []  Cardiology/Vascular   []  Pathology  []  Old records  []  Other:    Assessment & Plan   Assessment / Plan     Assessment:  Convulsive status of optic is  Acute hypoxic respiratory failure  Metabolic acidosis    Plan:  Continue neurochecks  Keppra  Vimpat  Seizure precautions  Monitor clinically  Neurology following  Continue ICU monitoring for now         Discussed with RN.    VTE Prophylaxis:  Pharmacologic & mechanical VTE prophylaxis orders are present.        CODE STATUS:   Code Status (Patient has no pulse and is not breathing): CPR (Attempt to Resuscitate)  Medical Interventions (Patient has pulse or is breathing): Full Support      Electronically signed by Tanner Avendano MD, 3/8/2025, 15:23 EST.

## 2025-03-08 NOTE — CONSULTS
Pulmonary / Critical Care Consult Note      Patient Name: Fred Wood  : 1995  MRN: [id number]  Primary Care Physician:  Provider, No Known  Referring Physician: No ref. provider found  Date of admission: 3/7/2025    Subjective   Subjective     Reason for Consult/ Chief Complaint: Recurrent Seizures, AMS    HPI:  Fred Wood is a 29 y.o. male with history of seizure disorder on keppra 1.5 gm bid at home, and possibly lacosamide in past as well, presented to hospital with recurrent seizures at home today. Had 2 episodes at home, and 2 more in the ED, generalized tonic clonic seizures. Patient was given a dose of keppra 1.5 gm once, and still had seizures, along with valium 10 mg and ativan 2 doses. Patient was intubated for airway protection and placed on mechanical ventilator. Tele-neurology service was consulted. Bedside spot EEG done and is being admitted to ICU on vent for recurrent seizures. Pulmonary critical care service is consulted for assistance with management of his acute events with AMS, recurrent seizures and need of being on the mechanical ventilator. He is currently on vent, with vent setting of ACVC 16/550/5/50%. He is on propofol drip. No active seizure burden seen on spot EEG.     Review of Systems  Could not be obtained, patient not optimally responsive.           Personal History     Past Medical History:   Diagnosis Date   • Seizures        History reviewed. No pertinent surgical history.    Family History: Unknown history of recurrent seizures in family.     Social History:  reports that he has been smoking cigarettes. He has been exposed to tobacco smoke. He has never used smokeless tobacco. He reports current alcohol use. He reports current drug use. Drugs: Methamphetamines and Marijuana.    Home Medications:  lacosamide, levETIRAcetam, and omeprazole    Allergies:  No Known Allergies    Objective    Objective     Vitals:   Temp:  [96.6 °F (35.9 °C)-98.4 °F (36.9 °C)] 98.1 °F  (36.7 °C)  Heart Rate:  [] 104  Resp:  [18-24] 20  BP: (106-171)/() 115/58  FiO2 (%):  [43 %-60 %] 43 %    Physical Exam:  Vital Signs Reviewed   Critically ill, on invasive mechanical ventilator  HEENT:  PERRL, EOMI.  ET tube orally  Neck:  Supple, no JVD, no thyromegaly  Lymph: no axillary, cervical, supraclavicular lymphadenopathy noted bilaterally  Chest:  B/l coarse mechanical ventilator breath sounds, no wheezing, crackles or rhonchi, resonant to percussion b/l   CV: RRR, no MGR, pulses 2+, equal  Abd:  Soft, NT, ND, + BS, no HSM  EXT:  no clubbing, no cyanosis, no edema, no joint tenderness  Neuro:  Sedated, not optimally responsive, on vent  Skin: No rashes or lesions noted      Result Review    Result Review:  I have personally reviewed the results from the time of this admission to 3/7/2025 21:57 EST and agree with these findings:  [x]  Laboratory  [x]  Microbiology  [x]  Radiology  [x]  EKG/Telemetry   [x]  Cardiology/Vascular   []  Pathology  []  Old records  []  Other:  Most notable findings include:         Lab 03/07/25  1529   WBC 27.12*   HEMOGLOBIN 16.7   HEMATOCRIT 52.9*   PLATELETS 376   SODIUM 141   POTASSIUM 5.1   CHLORIDE 101   CO2 7.0*   BUN 14   CREATININE 1.52*   GLUCOSE 220*   CALCIUM 8.9   TOTAL PROTEIN 8.1   ALBUMIN 5.0   GLOBULIN 3.1       XR Chest 1 View    Result Date: 3/7/2025  XR CHEST 1 VW Date of Exam: 3/7/2025 4:39 PM EST Indication: Cough, persistent Postintubation cough Comparison: 9/18/2023. Findings: Endotracheal tube tip appears superior to the medial clavicles, estimated to be 8 cm above the oly. Enteric tube extends into the left upper quadrant, tip beyond the margins of this exam. Heart size appears within the limits. There appear to be new mild bibasilar opacities with low lung volumes. No significant effusion or pneumothorax is identified.     Impression: Impression: 1.Endotracheal tube tip appears superior to the medial clavicles, estimated to be 8 cm  above the oly. Tube could be advanced by an additional 3 to 4 cm for more optimal positioning. 2.Enteric tube extends into the left upper quadrant, tip beyond the margins of this exam. 3.New mild bibasilar opacities which could be related to atelectasis or pneumonia. Results were called to the ordering provider by Dr. Enamorado at 3/7/2025 4:51 PM EST. Electronically Signed: Fransisco Enamorado  3/7/2025 4:51 PM EST  Workstation ID: [id number]          CT Head Without Contrast    Result Date: 3/7/2025  CT HEAD WO CONTRAST Date of Exam: 3/7/2025 7:43 PM EST Indication: Seizures headache. Comparison: 9/17/2023 Technique: Axial CT images were obtained of the head without contrast administration.  Reconstructed coronal and sagittal images were also obtained. Automated exposure control and iterative construction methods were used. Findings: Gray-white matter differentiation is maintained without evidence of an acute infarction. No intracranial mass or mass effect. No extra-axial mass or collection. The ventricles and sulci are normal in size and configuration. The posterior fossa appears normal. Sellar and suprasellar structures are normal. Orbital and paranasal soft tissues are normal. Mucosal thickening versus mucoid retention cysts or polyps within the maxillary sinuses.. The bony calvarium appears intact. No acute fractures. No lytic or blastic bony diseases.     Impression: Impression: No acute intracranial pathology. Electronically Signed: Jesús Forrest MD  3/7/2025 8:54 PM EST  Workstation ID: [id number]      Assessment & Plan   Assessment / Plan     Active Hospital Problems:  Active Hospital Problems    Diagnosis    • **Convulsive status epilepticus    • Acute hypoxic respiratory failure    • Metabolic acidosis    • Acute metabolic encephalopathy          Impression:  Recurrent seizures   Altered mental status, post ictal state  Metabolic acidosis  Acute hypoxic and hypercapnic resp failure  Hyperglycemia  Acute renal  failure  Mild increase in CK  Leukocytosis  Drug screen positive for THC    Plan:  Continue with invasive mechanical ventilator.   Vent settings at ACVC 16/550/5/FiO2 to keep sats > 90%.   Loaded with keppra 2 gms.  Mag is at 2.6.   Currently on propofol drip. Fentanyl drip if needed.   Bedside spot EEG does not show seizure burden.   CK high.   Monitor renal function closely.   Start NS at 100 cc/hr.   SAT/ SBT in AM.   If no seizures, will consider liberation from mechanical ventilator in AM.       Patient is critically ill in ICU with recurrent seizures, AMS, post ictal state, acute renal failure, resp failure.   I spent 32 minutes of critical care time, excluding any procedure notes, in review, analysis, obtaining history and physical, formulating care plan, and I led multi-disciplinary critical care rounds with bedside nurse, respiratory therapist, clinical pharmacist and other allied services. I have discussed the case with primary service and other consultants as well.     Electronically signed by Maycol Yang MD, 3/7/2025,

## 2025-03-08 NOTE — PLAN OF CARE
Sedation turned off this am and patient obeyed commands. Extubated to Airvo. TATS applied per security. Combative and agitated multiple times this shift. Precedex gtt infusing. Ceribell applied twice this shift per MD request. Highest seizure burden noted was 23%. Ativan x 2 given for seizure burden. Seizure burden on most recent ceribell application 0%. Haldol given once for extreme agitation. Wife updated on status per phone call.

## 2025-03-08 NOTE — PLAN OF CARE
Goal Outcome Evaluation:   Patient intubated in ER due to seizure activity for airway protection. Titrated fio2 to 40% and minimal secretions were produced fro ET tube.

## 2025-03-08 NOTE — PROGRESS NOTES
RT EQUIPMENT DEVICE RELATED - SKIN ASSESSMENT    RT Medical Equipment/Device:     High Flow Nasal Cannula:Airvo    Skin Assessment:      Cheek:  Intact  Nose:  Intact    Device Skin Pressure Protection:  Positioning supports utilized    Nurse Notification:  Liliam Delgado, RRT

## 2025-03-08 NOTE — PROGRESS NOTES
RT EQUIPMENT DEVICE RELATED - SKIN ASSESSMENT    RT Medical Equipment/Device:     High Flow Nasal Cannula:Airvo    Skin Assessment:      Cheek:  Intact  Ears:  Intact  Nares:  Intact    Device Skin Pressure Protection:  Pressure points protected    Nurse Notification:  Liliam Louise, RRT

## 2025-03-08 NOTE — PLAN OF CARE
Goal Outcome Evaluation:  Plan of Care Reviewed With: patient, spouse        Progress: no change  Outcome Evaluation: Pt arrived from ED around 2100 on prop and fent drips and intubated. Pt remained calm on the vent until about 0100. He then began to sit up, thrash, and clamp down on ETT. Four RNs had to keep pt safe during this episode, titrated prop and fent per orders and MD contacted. MD ordered a dose of Versed IVP and Versed drip. Pt currently calm and tolerating vent.

## 2025-03-08 NOTE — ED NOTES
Spoke with wife, she stated he is taking his keppra as it is prescribed and also when the seizures started today he had fallen and hit his head, After the fall is when they started.

## 2025-03-08 NOTE — PLAN OF CARE
Goal Outcome Evaluation:  Plan of Care Reviewed With: patient        Progress: no change  Outcome Evaluation: Extubated patient this morning. Placed on airvo, current settings 45L 45%. Will continue to wean per O2 sats.

## 2025-03-08 NOTE — PROGRESS NOTES
LOS: 1 day     Chief Complaint: Seizure       SUBJECTIVE:    History taken from: chart family RN    Interval History: Fred Wood was admitted on 3/7/2025   I saw him in ICU to at [facility]    Source of information is mostly the medical records and my discussion with ICU team in detail    I was able to get in touch with his wife Anna Wood over the phone    This gentleman has likely intractable epilepsy    He has been seeing Dr. Marquis and he was referred to [facility] to see an epileptologist and there was some confusion because he thought he was having some tests and when he went there there was no record of it so apparently instead of making an appointment he thought he is going to just have a test    He has used Vimpat and it looks like 200 mg twice daily and Keppra 1500 mg twice daily    His wife reports that he takes them regularly and there was a confusion about whether he is on Vimpat anymore but his wife reports that she picked it herself about a week or 2 weeks ago    Also that they get it from the pharmacy and sometimes is not available but she denies any noncompliance     His seizure frequency is rarely 1 or 2 every so many months but when he gets them he is quite postictal and belligerent and angry    Keppra and Vimpat has helped him    He has previously tried oxcarbazepine and valproic acid without much help    To her knowledge he has not been on Dilantin or phenobarbital or lamotrigine or zonisamide etc.    You is lethargic but turning around, as he was intubated yesterday but now has been extubated    He did have some agitation later on    I gave him some extra Ativan to calm him down    We will resume his Keppra and Vimpat and see how he does and if he has further events then I will try phenytoin/fosphenytoin    Vital signs are stable    A couple of blood pressure elevations but may be that when he was restless    Labs reviewed, white count 21.03    Creatinine  "1.28    He is pleasantly confused but looks nonfocal    [employer]  [employer] [facility]     Stat Consult     Patient Name:   Fred Wood  YOB: 1995  Identification Number:   MRN - [id number]  Date of Service:   03/07/2025 16:43:24     Diagnosis:        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 He was discharged on lacosamide and levetiracetam.  Today, by EMS report, he had 4 witnessed generalized convulsive seizures. On arrival to the ED he had another seizure. He has received 10 mg diazepam, 2 mg IV lorazepam and 1500 mg IV Keppra. His outpatient dose of Keppra is 1500 mg BID and he no longer takes Vimpat.  He was normothermic on arrival to this ED and is currently 35.8 C, on propofol at 5 mcg/kg/min.     #1 Convulsive status epilepticus     I have given an additional 4 mg IV lorazepam and 20 mg/kg IV levetiracetam.  If it is confirmed that he has been compliant with Keppra, increase the outpatient maintenance dose of Keppra to 2 g BID.  I have added on a magnesium and CK to the labs. Please follow the CK and if elevated trend until it plateaus, giving IV fluids to flush the kidneys if the initial value is over 1000. Replete magnesium if < 2.  After lorazepam and levetiracetam have been administered, and Ceribell \"confirms\" absence of status epilepticus, discontinue propofol and try to obtain a neurologic examination.  This was not refractory status epilepticus so the patient does not require anesthetics for any duration of time.  If he does not recover consciousness within a few hours I would obtain a head CT.        Recommendations:  Our recommendations are outlined below.     Nursing Recommendations :  Maintain Euglycemia and " Euthermia  Neuro checks q1-2 hrs during ICU stay if critical  Once stable neuro checks q 4hrs     Seizure precautions :  Seizure precautions including no driving for state mandated time frame were discussed with patient with clear understanding     Disposition :  Neurology will follow           ----------------------------------------------------------------------------------------------------        Advanced Imaging:  Advanced Imaging Deferred because:     Stroke not suspected with clinical presentation and exam           Metrics:  Dispatch Time: 03/07/2025 16:42:09  Callback Response Time: 03/07/2025 16:44:20     Primary Provider Notified of Diagnostic Impression and Management Plan on: 03/07/2025 17:29:22        CT HEAD:  Image Unavailable - Discussed with Radiologist / Reviewed Radiology Report  not performed        Labs  reviewed  sodium, potassium, and calcium are normal; there is no magnesium  WBC 27K           ----------------------------------------------------------------------------------------------------     Chief Complaint:  flurry of seizures     History of Present Illness:  Patient is a 29 year old Male.  This is a 28 y/o man with epilepsy who was last hospitalized for generalized convulsive seizures 9/2024 when they were attributed to marijuana use and medication noncompliance. He was discharged on lacosamide and levetiracetam.  Today, by EMS report, he had 4 witnessed generalized convulsive seizures. On arrival to the ED he had another seizure. He has received 10 mg diazepam, 2 mg IV lorazepam and 1500 mg IV Keppra. His outpatient dose of Keppra is 1500 mg BID and he no longer takes Vimpat.  He was normothermic on arrival to this ED and is currently 35.8 C, on propofol at 5 mcg/kg/min.              Medications:     No Anticoagulant use   No Antiplatelet use  Reviewed EMR for current medications     Allergies:   Reviewed     Social History:  Patient Is:   Drug Use: Yes     Family History:      There is no family history of premature cerebrovascular disease pertinent to this consultation     ROS :  14 Points Review of Systems was performed and was negative except mentioned in HPI.     Past Surgical History:  There Is No Surgical History Contributory To Today’s Visit        Examination:  BP(171/93), Pulse(55), Blood Glucose(222)     Spoke with : ED Physician     Coma Exam:     Ventilator:  Yes  Sedated: Yes  Paralyzed: Yes  Breathes spontaneously above ventilator rate: No     Responds to Voice:  No     Responds to Sternal rub:  No     Withdraws limb from painful stimulation:  No     Spontaneous limb movement:   Right arm: No  Left arm: No  Right leg: No  Left leg: No     Pupils:  Equal and Reactive     Corneal Reflexes: not tested     Oculocephalic Reflexes:  Normal     Cough/Gag Reflexes: not tested     Free Text:  no adventitious movements.           This consult was conducted in real time using interactive audio and video technology. Patient was informed of the technology being used for this visit and agreed to proceed. Patient located in hospital and provider located at home/office setting.     Patient is being evaluated for possible acute neurologic impairment and high probability of imminent or life - threatening deterioration.I spent total of 39 minutes providing care to this patient, including time for face to face visit via telemedicine, review of medical records, imaging studies and discussion of findings with providers, the patient and / or family.        Dr Jazzy Segura             - Portions of the above HPI were copied from previous encounters and edited as appropriate.    Patient Complaints: Feeling sweaty and hot      Review of Systems   Detailed review of system not possible because of his confused state    Pertinent PMH:  has a past medical history of Seizures.   ________________________________________________     OBJECTIVE:  Confused, likely postictal, in 4 point restraints    Limited  "neurological examination  He is awake and he can tell me who he is  He thinks this is March and 2025  He knows he is in the hospital    On cranial nerve screening, no forceful eye deviation, ptosis or nystagmus  No roving eye movements  Extraocular movements are intact    I do not see any facial asymmetry    Hearing seem to be intact    Tongue was midline    No neck stiffness    On motor examination he is in restraints but strength is at least 4+ all over    Sensory examination intact for pain    I could not get any reflexes    Toes are mute    Gait and coordination could not be evaluated          ________________________________________________   RESULTS REVIEW    VITAL SIGNS:  Temp:  [96.6 °F (35.9 °C)-99 °F (37.2 °C)] 98.6 °F (37 °C)  Heart Rate:  [] 131  Resp:  [18-24] 20  BP: ()/() 142/120  FiO2 (%):  [30 %-60 %] 30 %    LABS:       Lab 03/08/25  0249 03/07/25  1529   WBC 21.03* 27.12*   HEMOGLOBIN 15.3 16.7   HEMATOCRIT 48.1 52.9*   PLATELETS 295 376   NEUTROS ABS  --  21.60*   IMMATURE GRANS (ABS)  --  0.45*   LYMPHS ABS  --  2.81   MONOS ABS  --  2.07*   EOS ABS  --  0.05   MCV 91.1 94.3   PROCALCITONIN 0.21  --          Lab 03/08/25  0249 03/07/25  1529   SODIUM 139 141   POTASSIUM 4.6 5.1   CHLORIDE 106 101   CO2 18.5* 7.0*   ANION GAP 14.5 33.0*   BUN 13 14   CREATININE 1.28* 1.52*   EGFR 78.2 63.6   GLUCOSE 116* 220*   CALCIUM 8.9 8.9   MAGNESIUM 2.5 2.6   PHOSPHORUS 4.4  --          Lab 03/08/25  0249 03/07/25  1529   TOTAL PROTEIN  --  8.1   ALBUMIN 4.4 5.0   GLOBULIN  --  3.1   ALT (SGPT)  --  41   AST (SGOT)  --  32   BILIRUBIN  --  <0.2   ALK PHOS  --  105                     Lab 03/07/25  1719   PH, ARTERIAL 7.118*   PCO2, ARTERIAL 61.3*   PO2 ART 79.3*   O2 SATURATION ART 90.2*   FIO2 50   HCO3 ART 19.8*   BASE EXCESS ART -10.8*         No results found for: \"TSH\", \"LDL\", \"HGBA1C\", \"LGYROULS44\", \"PHENYTOIN\", \"PHENOBARB\", \"VALPROATE\", \"CBMZ\"      IMAGING STUDIES:  CT Head Without " Contrast  Result Date: 3/7/2025  Impression: No acute intracranial pathology. Electronically Signed: Jesús Forrest MD  3/7/2025 8:54 PM EST  Workstation ID: [id number]    XR Chest 1 View  Result Date: 3/7/2025  Impression: 1.Endotracheal tube tip appears superior to the medial clavicles, estimated to be 8 cm above the oly. Tube could be advanced by an additional 3 to 4 cm for more optimal positioning. 2.Enteric tube extends into the left upper quadrant, tip beyond the margins of this exam. 3.New mild bibasilar opacities which could be related to atelectasis or pneumonia. Results were called to the ordering provider by Dr. Enamorado at 3/7/2025 4:51 PM EST. Electronically Signed: Fransisco Enamorado  3/7/2025 4:51 PM EST  Workstation ID: [id number]      I reviewed the patient's new clinical results.    ________________________________________________      PROBLEM LIST:    Convulsive status epilepticus    Acute metabolic encephalopathy    Acute hypoxic respiratory failure    Metabolic acidosis        ASSESSMENT/PLAN:  Likely breakthrough seizures  Questionable convulsive status, resolved  Acute hypoxic respiratory failure, metabolic acidosis, likely secondary to above    The patient got from his wife was that he does have breakthrough seizures but otherwise does very well on Keppra and Vimpat    So we will resume Keppra 1500 mg twice daily  Vimpat 200 mg twice daily    And see how he does    Nonetheless he needs to make an appoint with the epileptologist, Dr. Washington or Dr. Herron or their colleagues at [facility]      - Fall, syncope precautions (see below)    SEIZURE/SYNCOPE INSTRUCTIONS:  -Recommended to observe all seizure precautions, including, but not limited to:   -No driving until seizure free for more than 3 months- as per State driving regulation / law;   -Avoid all high-risk activity, Take showers instead of baths, Avoid swimming without observation, Avoid open heat sources, Avoid working at heights, and  Avoid engaging in all potentially hazardous activities.           **Please refer to previous notes for further details and recommendations.     I discussed the patient's findings and my recommendations with patient, family, nursing staff, and consulting provider    Radha Patel MD  03/08/25  11:06 EST

## 2025-03-08 NOTE — PROGRESS NOTES
[facility]     Progress Note    Patient Name: Fred Wood  : 1995  MRN: [id number]  Primary Care Physician:  Provider, No Known  Date of admission: 3/7/2025    Subjective   Subjective       Chief Complaint:   Patient is critically ill in ICU with recurrent seizures, altered mental status, postictal state, metabolic acidosis, acute hypoxic and hypercapnic respiratory failure, hyperglycemia, acute renal failure, mild increase in CK.    Critical drips: Propofol drip  Lines and tubes: Peripheral lines and ET tube    Over last 24 hours, patient was intubated in the ER with recurrent seizures.  Started on Keppra, as needed Ativan.  Was given a dose of Valium prior to intubation.    Overnight, no acute events.     This morning,  Vent settings at AC VC 16/550/PEEP of 5/FiO2 30%.  Switched to pressure support and tolerating well.  Following commands.  Patient is agitated on exam.  Bedside EEG did not show any seizure burden.  Has no fever.  Tmax was 98.18.9.      Review of Systems  Could not be obtained, patient optimally responsive.      Objective   Objective     Vitals:   Temp:  [96.6 °F (35.9 °C)-98.8 °F (37.1 °C)] 98.8 °F (37.1 °C)  Heart Rate:  [] 96  Resp:  [18-24] 20  BP: ()/() 98/49  FiO2 (%):  [38 %-60 %] 39 %  Physical Exam   Vital Signs Reviewed  Critically ill, on invasive mechanical ventilator, not optimally responsive.  HEENT:  PERRL, EOMI. ET tube orally  Neck:  Supple, No JVD, no thyromegaly  Lymph: no axillary, cervical, supraclavicular lymphadenopathy noted bilaterally  Chest: Bilateral coarse mechanical ventilator breath sounds, no wheezing, crackles or rhonchi, resonant to percussion bilaterally  CV: RRR, no MGR, pulses 2+, equal  Abd:  Soft, NT, ND, + BS, no HSM  EXT:  no clubbing, no cyanosis, No BLE edema  Neuro: Sedated, on invasive acute ventilator  Skin: No rashes or lesions noted      Result Review    Result Review:  I have personally reviewed the results from the  time of this admission to 3/8/2025 07:09 EST and agree with these findings:  [x]  Laboratory  [x]  Microbiology  [x]  Radiology  [x]  EKG/Telemetry   [x]  Cardiology/Vascular   []  Pathology  []  Old records  []  Other:  Most notable findings include:         Lab 03/08/25  0249 03/07/25  1529   WBC  --  27.12*   HEMOGLOBIN  --  16.7   HEMATOCRIT  --  52.9*   PLATELETS  --  376   SODIUM 139 141   POTASSIUM 4.6 5.1   CHLORIDE 106 101   CO2 18.5* 7.0*   BUN 13 14   CREATININE 1.28* 1.52*   GLUCOSE 116* 220*   CALCIUM 8.9 8.9   PHOSPHORUS 4.4  --    TOTAL PROTEIN  --  8.1   ALBUMIN 4.4 5.0   GLOBULIN  --  3.1       XR Chest 1 View  Result Date: 3/7/2025  XR CHEST 1 VW Date of Exam: 3/7/2025 4:39 PM EST Indication: Cough, persistent Postintubation cough Comparison: 9/18/2023. Findings: Endotracheal tube tip appears superior to the medial clavicles, estimated to be 8 cm above the oly. Enteric tube extends into the left upper quadrant, tip beyond the margins of this exam. Heart size appears within the limits. There appear to be new mild bibasilar opacities with low lung volumes. No significant effusion or pneumothorax is identified.     Impression: Impression: 1.Endotracheal tube tip appears superior to the medial clavicles, estimated to be 8 cm above the oly. Tube could be advanced by an additional 3 to 4 cm for more optimal positioning. 2.Enteric tube extends into the left upper quadrant, tip beyond the margins of this exam. 3.New mild bibasilar opacities which could be related to atelectasis or pneumonia. Results were called to the ordering provider by Dr. Enamorado at 3/7/2025 4:51 PM EST. Electronically Signed: Fransisco Enamorado  3/7/2025 4:51 PM EST  Workstation ID: [id number]             Assessment & Plan   Assessment / Plan     Brief Patient Summary:  Fred Wood is a 29 y.o. male   Recurrent seizures   Altered mental status, post ictal state  Metabolic acidosis  Acute hypoxic and hypercapnic resp  failure  Hyperglycemia  Acute renal failure  Mild increase in CK  Leukocytosis  Drug screen positive for THC    Active Hospital Problems:  Active Hospital Problems    Diagnosis     **Convulsive status epilepticus     Acute hypoxic respiratory failure     Metabolic acidosis     Acute metabolic encephalopathy      Plan:   Intubated for airway protection.  We will extubate to nasal cannula oxygen today.  Continue with Keppra 1.5 g twice daily.  Start lacosamide 200 mg IV twice daily.  May need fosphenytoin loading if continues to have seizure.  As needed Ativan for acute seizure episodes.  Mag is more than 2.  Wean off propofol drip.  Continue spot EEG at bedside.  Neurology service consulted.  CK high.   Monitor renal function closely.  Improving renal function.  Discontinue IV fluids.  Heparin 5000 units SQ 3 times daily.    VTE Prophylaxis:  Pharmacologic & mechanical VTE prophylaxis orders are present.        CODE STATUS:   Code Status (Patient has no pulse and is not breathing): CPR (Attempt to Resuscitate)  Medical Interventions (Patient has pulse or is breathing): Full Support      Patient is critically ill in ICU with recurrent seizures with history of multiple episodes of seizures in the past, AMS, post ictal state, acute renal failure, resp failure,.   I spent 31 minutes of critical care time, excluding any procedure notes, in review, analysis, obtaining history and physical, formulating care plan, and I led multi-disciplinary critical care rounds with bedside nurse, respiratory therapist, clinical pharmacist and other allied services. I have discussed the case with primary service and other consultants as well.       Electronically signed by Maycol Yang MD, 3/8/2025, 07:09 EST.

## 2025-03-08 NOTE — PROGRESS NOTES
RT EQUIPMENT DEVICE RELATED - SKIN ASSESSMENT    RT Medical Equipment/Device:     ETT Cunningham/Anchorfast    Skin Assessment:      Cheek:  Intact  Neck:  Intact  Lips:  Intact  Mouth:  Intact    Device Skin Pressure Protection:  Skin-to-device areas padded:  Anchorfast    Nurse Notification:  Lliiam Nettles, RRT

## 2025-03-09 VITALS
DIASTOLIC BLOOD PRESSURE: 70 MMHG | TEMPERATURE: 98.6 F | OXYGEN SATURATION: 95 % | SYSTOLIC BLOOD PRESSURE: 122 MMHG | HEART RATE: 73 BPM | RESPIRATION RATE: 15 BRPM | HEIGHT: 70 IN | WEIGHT: 237.7 LBS | BODY MASS INDEX: 34.03 KG/M2

## 2025-03-09 LAB
ALBUMIN SERPL-MCNC: 3.9 G/DL (ref 3.5–5.2)
ALBUMIN/GLOB SERPL: 1.4 G/DL
ALP SERPL-CCNC: 71 U/L (ref 39–117)
ALT SERPL W P-5'-P-CCNC: 33 U/L (ref 1–41)
ANION GAP SERPL CALCULATED.3IONS-SCNC: 8.6 MMOL/L (ref 5–15)
AST SERPL-CCNC: 40 U/L (ref 1–40)
BASOPHILS # BLD AUTO: 0.03 10*3/MM3 (ref 0–0.2)
BASOPHILS NFR BLD AUTO: 0.2 % (ref 0–1.5)
BILIRUB SERPL-MCNC: 0.4 MG/DL (ref 0–1.2)
BUN SERPL-MCNC: 14 MG/DL (ref 6–20)
BUN/CREAT SERPL: 12.6 (ref 7–25)
CALCIUM SPEC-SCNC: 9.1 MG/DL (ref 8.6–10.5)
CHLORIDE SERPL-SCNC: 108 MMOL/L (ref 98–107)
CK SERPL-CCNC: 1126 U/L (ref 20–200)
CO2 SERPL-SCNC: 21.4 MMOL/L (ref 22–29)
CREAT SERPL-MCNC: 1.11 MG/DL (ref 0.76–1.27)
D-LACTATE SERPL-SCNC: 1.4 MMOL/L (ref 0.5–2)
DEPRECATED RDW RBC AUTO: 41.1 FL (ref 37–54)
EGFRCR SERPLBLD CKD-EPI 2021: 92.8 ML/MIN/1.73
EOSINOPHIL # BLD AUTO: 0.01 10*3/MM3 (ref 0–0.4)
EOSINOPHIL NFR BLD AUTO: 0.1 % (ref 0.3–6.2)
ERYTHROCYTE [DISTWIDTH] IN BLOOD BY AUTOMATED COUNT: 12.5 % (ref 12.3–15.4)
GLOBULIN UR ELPH-MCNC: 2.7 GM/DL
GLUCOSE SERPL-MCNC: 137 MG/DL (ref 65–99)
HCT VFR BLD AUTO: 43.2 % (ref 37.5–51)
HGB BLD-MCNC: 13.8 G/DL (ref 13–17.7)
IMM GRANULOCYTES # BLD AUTO: 0.04 10*3/MM3 (ref 0–0.05)
IMM GRANULOCYTES NFR BLD AUTO: 0.3 % (ref 0–0.5)
LYMPHOCYTES # BLD AUTO: 1.29 10*3/MM3 (ref 0.7–3.1)
LYMPHOCYTES NFR BLD AUTO: 10.6 % (ref 19.6–45.3)
MAGNESIUM SERPL-MCNC: 2.2 MG/DL (ref 1.6–2.6)
MCH RBC QN AUTO: 28.5 PG (ref 26.6–33)
MCHC RBC AUTO-ENTMCNC: 31.9 G/DL (ref 31.5–35.7)
MCV RBC AUTO: 89.1 FL (ref 79–97)
MONOCYTES # BLD AUTO: 1.3 10*3/MM3 (ref 0.1–0.9)
MONOCYTES NFR BLD AUTO: 10.6 % (ref 5–12)
NEUTROPHILS NFR BLD AUTO: 78.2 % (ref 42.7–76)
NEUTROPHILS NFR BLD AUTO: 9.54 10*3/MM3 (ref 1.7–7)
NRBC BLD AUTO-RTO: 0 /100 WBC (ref 0–0.2)
PHOSPHATE SERPL-MCNC: 2.4 MG/DL (ref 2.5–4.5)
PLATELET # BLD AUTO: 213 10*3/MM3 (ref 140–450)
PMV BLD AUTO: 10 FL (ref 6–12)
POTASSIUM SERPL-SCNC: 4.7 MMOL/L (ref 3.5–5.2)
PROT SERPL-MCNC: 6.6 G/DL (ref 6–8.5)
RBC # BLD AUTO: 4.85 10*6/MM3 (ref 4.14–5.8)
SODIUM SERPL-SCNC: 138 MMOL/L (ref 136–145)
WBC NRBC COR # BLD AUTO: 12.21 10*3/MM3 (ref 3.4–10.8)

## 2025-03-09 PROCEDURE — 82550 ASSAY OF CK (CPK): CPT | Performed by: PHYSICIAN ASSISTANT

## 2025-03-09 PROCEDURE — 83605 ASSAY OF LACTIC ACID: CPT | Performed by: PHYSICIAN ASSISTANT

## 2025-03-09 PROCEDURE — 25010000002 LEVETRIRACETAM PER 10 MG: Performed by: PHYSICIAN ASSISTANT

## 2025-03-09 PROCEDURE — 84100 ASSAY OF PHOSPHORUS: CPT | Performed by: INTERNAL MEDICINE

## 2025-03-09 PROCEDURE — 85025 COMPLETE CBC W/AUTO DIFF WBC: CPT | Performed by: INTERNAL MEDICINE

## 2025-03-09 PROCEDURE — 25010000002 HEPARIN (PORCINE) PER 1000 UNITS: Performed by: STUDENT IN AN ORGANIZED HEALTH CARE EDUCATION/TRAINING PROGRAM

## 2025-03-09 PROCEDURE — C9254 INJECTION, LACOSAMIDE: HCPCS | Performed by: INTERNAL MEDICINE

## 2025-03-09 PROCEDURE — 80053 COMPREHEN METABOLIC PANEL: CPT | Performed by: PHYSICIAN ASSISTANT

## 2025-03-09 PROCEDURE — 25010000002 LACOSAMIDE 200 MG/20ML SOLUTION: Performed by: INTERNAL MEDICINE

## 2025-03-09 PROCEDURE — 94799 UNLISTED PULMONARY SVC/PX: CPT

## 2025-03-09 PROCEDURE — 25810000003 LACTATED RINGERS PER 1000 ML: Performed by: PHYSICIAN ASSISTANT

## 2025-03-09 PROCEDURE — 83735 ASSAY OF MAGNESIUM: CPT | Performed by: INTERNAL MEDICINE

## 2025-03-09 PROCEDURE — 25010000003 DEXTROSE 5 % SOLUTION: Performed by: PHYSICIAN ASSISTANT

## 2025-03-09 PROCEDURE — 99238 HOSP IP/OBS DSCHRG MGMT 30/<: CPT | Performed by: STUDENT IN AN ORGANIZED HEALTH CARE EDUCATION/TRAINING PROGRAM

## 2025-03-09 PROCEDURE — 99291 CRITICAL CARE FIRST HOUR: CPT | Performed by: INTERNAL MEDICINE

## 2025-03-09 RX ADMIN — LEVETIRACETAM 1500 MG: 100 INJECTION, SOLUTION INTRAVENOUS at 08:07

## 2025-03-09 RX ADMIN — LACOSAMIDE 200 MG: 10 INJECTION INTRAVENOUS at 08:07

## 2025-03-09 RX ADMIN — Medication 10 ML: at 08:09

## 2025-03-09 RX ADMIN — SODIUM CHLORIDE, SODIUM LACTATE, POTASSIUM CHLORIDE, AND CALCIUM CHLORIDE 100 ML/HR: .6; .31; .03; .02 INJECTION, SOLUTION INTRAVENOUS at 09:00

## 2025-03-09 RX ADMIN — DEXMEDETOMIDINE HYDROCHLORIDE 0.9 MCG/KG/HR: 4 INJECTION, SOLUTION INTRAVENOUS at 09:00

## 2025-03-09 RX ADMIN — MUPIROCIN 1 APPLICATION: 20 OINTMENT TOPICAL at 08:07

## 2025-03-09 RX ADMIN — DEXMEDETOMIDINE HYDROCHLORIDE 1.1 MCG/KG/HR: 4 INJECTION, SOLUTION INTRAVENOUS at 05:04

## 2025-03-09 RX ADMIN — DEXMEDETOMIDINE HYDROCHLORIDE 1 MCG/KG/HR: 4 INJECTION, SOLUTION INTRAVENOUS at 00:37

## 2025-03-09 RX ADMIN — HEPARIN SODIUM 5000 UNITS: 5000 INJECTION INTRAVENOUS; SUBCUTANEOUS at 08:06

## 2025-03-09 RX ADMIN — SODIUM PHOSPHATE, MONOBASIC, MONOHYDRATE AND SODIUM PHOSPHATE, DIBASIC, ANHYDROUS 15 MMOL: 276; 142 INJECTION, SOLUTION INTRAVENOUS at 07:44

## 2025-03-09 NOTE — DISCHARGE SUMMARY
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       Hospital Course     Hospital Course:  Fred Wood is a 29 y.o. male with a history of epilepsy who initially presented after witnessed tonic-clonic seizures, transferred via EMS with 4 witnessed seizures between initial presentation and admission from the emergency room.  He was on Keppra and Vimpat apparently at home and taking them as prescribed reportedly, though history was limited by patient's mental status.  On arrival he was intubated for airway protection, transferred from the ICU after admission under the care of the hospitalist service.  He did well initially in the ICU but after extubation was found to have increased seizure burden with multiple spot EEG checks.  Was given Ativan multiple times with improvement in symptoms.  He did develop significant agitation and aggression toward hospital staff and nurses which was not related to seizure-like activity.  This required Haldol, 4 point restraints and later initiation on Precedex for the safety of the patient and staff.  He is alert and oriented this morning and has requested to leave AGAINST MEDICAL ADVICE after discontinuation of Precedex..         Day of Discharge     Vital Signs:  Temp:  [98.6 °F (37 °C)-100.2 °F (37.9 °C)] 98.6 °F (37  °C)  Heart Rate:  [57-85] 73  Resp:  [12-21] 15  BP: (107-142)/() 122/70  Flow (L/min) (Oxygen Therapy):  [2-45] 2  Physical Exam:     General: Awake, alert, in no acute distress  HENT: Atraumatic, normocephalic. Nasal cannula in place  Eyes: pupils equal, round, without scleral icterus  Cardiovascular: Regular rate and rhythm, no murmurs   Pulmonary: CTA bilaterally; no wheezes; no conversational dyspnea  Gastrointestinal: Soft nontender nondistended          Discharge Details        Discharge Medications        ASK your doctor about these medications        Instructions Start Date   lacosamide 200 MG tablet  Commonly known as: VIMPAT   Take 1 tablet twice a day      levETIRAcetam 750 MG tablet  Commonly known as: KEPPRA   2 tablets twice a day      omeprazole 40 MG capsule  Commonly known as: priLOSEC   40 mg, Oral, Daily               No Known Allergies    Discharge Disposition:  Left Against Medical Advice    Diet:  Hospital:No active diet order      Discharge Activity:       CODE STATUS:  Code Status and Medical Interventions: CPR (Attempt to Resuscitate); Full Support   Ordered at: 03/07/25 1843     Code Status (Patient has no pulse and is not breathing):    CPR (Attempt to Resuscitate)     Medical Interventions (Patient has pulse or is breathing):    Full Support         No future appointments.        Pertinent  and/or Most Recent Results     PROCEDURES:   Surgical Procedures Since Admission:Mechanical intubation      LAB RESULTS:      Lab 03/09/25 0340 03/08/25 0249 03/07/25  1529   WBC 12.21* 21.03* 27.12*   HEMOGLOBIN 13.8 15.3 16.7   HEMATOCRIT 43.2 48.1 52.9*   PLATELETS 213 295 376   NEUTROS ABS 9.54*  --  21.60*   IMMATURE GRANS (ABS) 0.04  --  0.45*   LYMPHS ABS 1.29  --  2.81   MONOS ABS 1.30*  --  2.07*   EOS ABS 0.01  --  0.05   MCV 89.1 91.1 94.3   PROCALCITONIN  --  0.21  --    LACTATE 1.4  --   --          Lab 03/09/25 0340 03/08/25  0249 03/07/25  1529   SODIUM 138 139 141   POTASSIUM  4.7 4.6 5.1   CHLORIDE 108* 106 101   CO2 21.4* 18.5* 7.0*   ANION GAP 8.6 14.5 33.0*   BUN 14 13 14   CREATININE 1.11 1.28* 1.52*   EGFR 92.8 78.2 63.6   GLUCOSE 137* 116* 220*   CALCIUM 9.1 8.9 8.9   MAGNESIUM 2.2 2.5 2.6   PHOSPHORUS 2.4* 4.4  --          Lab 03/09/25  0340 03/08/25  0249 03/07/25  1529   TOTAL PROTEIN 6.6  --  8.1   ALBUMIN 3.9 4.4 5.0   GLOBULIN 2.7  --  3.1   ALT (SGPT) 33  --  41   AST (SGOT) 40  --  32   BILIRUBIN 0.4  --  <0.2   ALK PHOS 71  --  105                     Lab 03/07/25  1719   PH, ARTERIAL 7.118*   PCO2, ARTERIAL 61.3*   PO2 ART 79.3*   O2 SATURATION ART 90.2*   FIO2 50   HCO3 ART 19.8*   BASE EXCESS ART -10.8*     Brief Urine Lab Results       None          Microbiology Results (last 10 days)       Procedure Component Value - Date/Time    Respiratory Culture - Sputum, ET Suction [[id number]] Collected: 03/07/25 2100    Lab Status: Preliminary result Specimen: Sputum from ET Suction Updated: 03/09/25 1221     Respiratory Culture Light growth (2+) The culture consists of normal respiratory xavi. This is a preliminary report; final report to follow.     Gram Stain Less than 10 Epithelial cells per low power field      Greater than 20 WBCs per low power field      Rare (1+) Gram positive cocci in pairs, chains and clusters      Occasional Gram positive bacilli            CT Head Without Contrast  Result Date: 3/7/2025  Impression: No acute intracranial pathology. Electronically Signed: Jesús Forrest MD  3/7/2025 8:54 PM EST  Workstation ID: [id number]    XR Chest 1 View  Result Date: 3/7/2025  Impression: 1.Endotracheal tube tip appears superior to the medial clavicles, estimated to be 8 cm above the oly. Tube could be advanced by an additional 3 to 4 cm for more optimal positioning. 2.Enteric tube extends into the left upper quadrant, tip beyond the margins of this exam. 3.New mild bibasilar opacities which could be related to atelectasis or pneumonia. Results were called to the  ordering provider by Dr. Enamorado at 3/7/2025 4:51 PM EST. Electronically Signed: Fransisco Enamorado  3/7/2025 4:51 PM EST  Workstation ID: [id number]               Results for orders placed during the hospital encounter of 09/17/23    Adult Transthoracic Echo Complete W/ Cont if Necessary Per Protocol    Interpretation Summary  •  Left ventricular systolic function is normal. Left ventricular ejection fraction appears to be 56 - 60%.  •  Left ventricular diastolic function is normal.  •  There are no significant valvular abnormalities.  •  The study is incomplete, subcostal views not obtained.  Patient was noncooperative towards the end of the study.      Labs Pending at Discharge:  Pending Labs       Order Current Status    Respiratory Culture - Sputum, ET Suction Preliminary result              Time spent on Discharge including face to face service: Less than 30 minutes    Electronically signed by Tanner Avendano MD, 03/09/25, 3:53 PM EDT.

## 2025-03-09 NOTE — PLAN OF CARE
Goal Outcome Evaluation:   VSS. High flow 32%/40L. Aggressive and agitated when awake. 4 point restraint. Precedex drip 1.1mcg/kg/hr. Guo with adequate output.

## 2025-03-09 NOTE — PROGRESS NOTES
[facility]     Progress Note    Patient Name: Fred Wood  : 1995  MRN: [id number]  Primary Care Physician:  Provider, No Known  Date of admission: 3/7/2025    Subjective   Subjective       Chief Complaint:   Patient is critically ill in ICU with recurrent seizures, altered mental status, postictal state, metabolic acidosis, acute hypoxic and hypercapnic respiratory failure, hyperglycemia, acute renal failure, mild increase in CK.    Critical drips: Precedex drip  Lines and tubes: Peripheral lines    Over last 24 hours, patient was extubated on nasal cannula.  Remained on Keppra and Vimpat.  Started on Precedex drip with severe agitation.    Overnight, no acute events.     This morning,  Wants to sign out AMA.  Has some headache.  No nausea or vomiting.  No fever or chills.  No chest pain or chest tightness.  Currently on Precedex drip at 0.1 mcg/kg/min.    Review of Systems  General:  No Fatigue, No Fever  HEENT: No dysphagia, No Visual Changes, no rhinorrhea  Respiratory:  +cough,+Dyspnea, scant phlegm, No Pleuritic Pain, no wheezing, no hemoptysis  Cardiovascular: Denies chest pain, denies palpitations,+GARCIA, No Chest Pressure  Gastrointestinal:  No Abdominal Pain, No Nausea, No Vomiting, No Diarrhea  Genitourinary:  No Dysuria, No Frequency, No Hesitancy  Musculoskeletal: No muscle pain or swelling  Neurologic:  No Confusion, no Dysarthria, Headaches  Skin:  No Rash, No Open Wounds        Objective   Objective     Vitals:   Temp:  [98.5 °F (36.9 °C)-100.2 °F (37.9 °C)] 100.2 °F (37.9 °C)  Heart Rate:  [] 59  Resp:  [15-21] 16  BP: ()/() 142/85  Flow (L/min) (Oxygen Therapy):  [2-50] 2  FiO2 (%):  [30 %] 30 %  Physical Exam   Vital Signs Reviewed  Bed ridden, on 4 point restraints  HEENT:  PERRL, EOMI.   Neck:  Supple, No JVD, no thyromegaly  Lymph: no axillary, cervical, supraclavicular lymphadenopathy noted bilaterally  Chest: Bilateral diminished breath sounds, no wheezing,  crackles or rhonchi, resonant to percussion bilaterally  CV: RRR, no MGR, pulses 2+, equal  Abd:  Soft, NT, ND, + BS, no HSM  EXT:  no clubbing, no cyanosis, No BLE edema  Neuro: A O*3, CN II -XII nl, has generalized weakness  Skin: No rashes or lesions noted      Result Review    Result Review:  I have personally reviewed the results from the time of this admission to 3/9/2025 07:39 EDT and agree with these findings:  [x]  Laboratory  [x]  Microbiology  [x]  Radiology  [x]  EKG/Telemetry   [x]  Cardiology/Vascular   []  Pathology  []  Old records  []  Other:  Most notable findings include:         Lab 03/09/25  0340 03/08/25  0249 03/07/25  1529   WBC 12.21* 21.03* 27.12*   HEMOGLOBIN 13.8 15.3 16.7   HEMATOCRIT 43.2 48.1 52.9*   PLATELETS 213 295 376   SODIUM 138 139 141   POTASSIUM 4.7 4.6 5.1   CHLORIDE 108* 106 101   CO2 21.4* 18.5* 7.0*   BUN 14 13 14   CREATININE 1.11 1.28* 1.52*   GLUCOSE 137* 116* 220*   CALCIUM 9.1 8.9 8.9   PHOSPHORUS 2.4* 4.4  --    TOTAL PROTEIN 6.6  --  8.1   ALBUMIN 3.9 4.4 5.0   GLOBULIN 2.7  --  3.1       XR Chest 1 View  Result Date: 3/7/2025  XR CHEST 1 VW Date of Exam: 3/7/2025 4:39 PM EST Indication: Cough, persistent Postintubation cough Comparison: 9/18/2023. Findings: Endotracheal tube tip appears superior to the medial clavicles, estimated to be 8 cm above the oly. Enteric tube extends into the left upper quadrant, tip beyond the margins of this exam. Heart size appears within the limits. There appear to be new mild bibasilar opacities with low lung volumes. No significant effusion or pneumothorax is identified.     Impression: Impression: 1.Endotracheal tube tip appears superior to the medial clavicles, estimated to be 8 cm above the oly. Tube could be advanced by an additional 3 to 4 cm for more optimal positioning. 2.Enteric tube extends into the left upper quadrant, tip beyond the margins of this exam. 3.New mild bibasilar opacities which could be related to  atelectasis or pneumonia. Results were called to the ordering provider by Dr. Enamorado at 3/7/2025 4:51 PM EST. Electronically Signed: Fransisco Enamorado  3/7/2025 4:51 PM EST  Workstation ID: [id number]             Assessment & Plan   Assessment / Plan     Brief Patient Summary:  Fred Wood is a 29 y.o. male   Recurrent seizures   Altered mental status, post ictal state  Metabolic acidosis  Acute hypoxic and hypercapnic resp failure  Hyperglycemia  Acute renal failure  Mild increase in CK  Leukocytosis  Drug screen positive for THC    Active Hospital Problems:  Active Hospital Problems    Diagnosis    • **Convulsive status epilepticus    • Acute hypoxic respiratory failure    • Metabolic acidosis    • Acute metabolic encephalopathy      Plan:   Intubated for airway protection.  We will extubate to nasal cannula oxygen today.  Continue with Keppra 1.5 g twice daily.  Continue lacosamide 200 mg IV twice daily.  Can switch to oral pill.  May need fosphenytoin loading if continues to have seizure.  As needed Ativan for acute seizure episodes.  Mag is more than 2.  Wean off Precedex drip.  Patient wants to sign out AMA.  Continue spot EEG at bedside.  Neurology service on board.  Monitor renal function closely.  Improving renal function.  Will need to follow-up with neurology as an outpatient.      VTE Prophylaxis:  Pharmacologic & mechanical VTE prophylaxis orders are present.        CODE STATUS:   Code Status (Patient has no pulse and is not breathing): CPR (Attempt to Resuscitate)  Medical Interventions (Patient has pulse or is breathing): Full Support      Patient is critically ill in ICU with recurrent seizures with history of multiple episodes of seizures in the past, post ictal state, acute renal failure, resp failure, generalized weakness.   I spent 31 minutes of critical care time, excluding any procedure notes, in review, analysis, obtaining history and physical, formulating care plan, and I led multi-disciplinary  critical care rounds with bedside nurse, respiratory therapist, clinical pharmacist and other allied services. I have discussed the case with primary service and other consultants as well.       Electronically signed by Maycol Yang MD, 3/9/2025, 07:39 EDT.

## 2025-03-10 LAB
BACTERIA SPEC RESP CULT: NORMAL
GRAM STN SPEC: NORMAL

## 2025-05-15 DIAGNOSIS — G40.109 LOCALIZATION-RELATED SYMPTOMATIC EPILEPSY AND EPILEPTIC SYNDROMES WITH SIMPLE PARTIAL SEIZURES, NOT INTRACTABLE, WITHOUT STATUS EPILEPTICUS: ICD-10-CM

## 2025-05-15 DIAGNOSIS — R56.9 GENERALIZED CONVULSIVE SEIZURES: ICD-10-CM

## 2025-05-15 NOTE — TELEPHONE ENCOUNTER
Caller: Fred Wood    Relationship: Self    Best call back number: [phone]    Requested Prescriptions:   Requested Prescriptions     Pending Prescriptions Disp Refills    levETIRAcetam (KEPPRA) 750 MG tablet 360 tablet 3     Si tablets twice a day    lacosamide (VIMPAT) 200 MG tablet 60 tablet 5     Sig: Take 1 tablet twice a day        Pharmacy where request should be sent: [id number] - [address] - [phone] [facility] [phone] FX     Last office visit with prescribing clinician: 2024   Last telemedicine visit with prescribing clinician: Visit date not found   Next office visit with prescribing clinician: Visit date not found     Additional details provided by patient: PATIENT STATES THAT HE IS OUT OF VIMPAT AND HE HAS 4 KEPPRA REMAINING.    Does the patient have less than a 3 day supply:  [x] Yes  [] No    Would you like a call back once the refill request has been completed: [] Yes [x] No    If the office needs to give you a call back, can they leave a voicemail: [] Yes [x] No    Lydia Aranda Rep   05/15/25 09:40 EDT

## 2025-05-16 RX ORDER — LEVETIRACETAM 750 MG/1
TABLET ORAL
Qty: 360 TABLET | Refills: 3 | Status: SHIPPED | OUTPATIENT
Start: 2025-05-16

## 2025-05-16 RX ORDER — LACOSAMIDE 200 MG/1
TABLET ORAL
Qty: 60 TABLET | Refills: 5 | Status: SHIPPED | OUTPATIENT
Start: 2025-05-16

## 2025-05-21 ENCOUNTER — OFFICE VISIT (OUTPATIENT)
Dept: NEUROLOGY | Facility: CLINIC | Age: 30
End: 2025-05-21
Payer: COMMERCIAL

## 2025-05-21 VITALS
WEIGHT: 239 LBS | HEART RATE: 79 BPM | DIASTOLIC BLOOD PRESSURE: 93 MMHG | SYSTOLIC BLOOD PRESSURE: 138 MMHG | BODY MASS INDEX: 34.22 KG/M2 | HEIGHT: 70 IN

## 2025-05-21 DIAGNOSIS — G40.109 LOCALIZATION-RELATED SYMPTOMATIC EPILEPSY AND EPILEPTIC SYNDROMES WITH SIMPLE PARTIAL SEIZURES, NOT INTRACTABLE, WITHOUT STATUS EPILEPTICUS: Primary | ICD-10-CM

## 2025-05-21 DIAGNOSIS — R56.9 GENERALIZED CONVULSIVE SEIZURES: ICD-10-CM

## 2025-05-21 RX ORDER — LACOSAMIDE 50 MG/1
TABLET ORAL
Qty: 14 TABLET | Refills: 0 | Status: SHIPPED | OUTPATIENT
Start: 2025-05-21

## 2025-05-21 RX ORDER — LACOSAMIDE 150 MG/1
TABLET ORAL
Qty: 14 TABLET | Refills: 0 | Status: SHIPPED | OUTPATIENT
Start: 2025-05-21

## 2025-05-21 RX ORDER — LEVETIRACETAM 750 MG/1
TABLET ORAL
Qty: 360 TABLET | Refills: 3 | Status: SHIPPED | OUTPATIENT
Start: 2025-05-21

## 2025-05-21 RX ORDER — LACOSAMIDE 200 MG/1
TABLET ORAL
Qty: 60 TABLET | Refills: 5 | Status: SHIPPED | OUTPATIENT
Start: 2025-05-21

## 2025-05-21 RX ORDER — LACOSAMIDE 100 MG/1
TABLET ORAL
Qty: 14 TABLET | Refills: 0 | Status: SHIPPED | OUTPATIENT
Start: 2025-05-21

## 2025-05-21 NOTE — PROGRESS NOTES
Chief Complaint  Follow-up and Med Refill (Vimpat & keppra.)    Subjective          Fred Wood is a 29 y.o. male who presents to [facility]  History of Present Illness      History of Present Illness  The patient is a 29-year-old male who presents for seizures.    He has been experiencing an increase in seizure activity, with the most recent episode occurring last week, lasting for 2 days, and another 2 seizures 2 days ago. Approximately a month prior, he was hospitalized in [place] for a few days due to seizures. Both [place] and [facility] have expressed their inability to manage his condition, necessitating helicopter transfers to more equipped facilities. He reports no recollection of these transfers, often waking up intubated.    His seizures are characterized by profuse sweating, tachycardia, and subsequent loss of consciousness. The onset of these symptoms varies; sometimes he can mitigate them, while other times they progress to a full seizure. He describes a sensation of gregoria vu, followed by tinnitus, excessive sweating, dizziness, and eventual blackout. He is able to ambulate during these episodes and finds that pacing can prevent a full seizure. If he stops pacing, he falls and seizes. He does not experience pre-seizure tremors but reports occasional hand shaking. He informs family members when he feels a seizure coming on. The time from symptom onset to seizure varies, sometimes not occurring at all, and other times happening within 5 minutes.    He has bitten his tongue during seizures and currently has a swollen tongue. His last seizure occurred 2 days ago, during which his father attempted to prevent him from biting his tongue and restrained him due to flailing arms. He also experiences lip smacking during seizures. He reports that 20% of his seizures resolve spontaneously within 5 minutes, during which he sweats but does not experience  "weakness or shakiness.    He has been off Vimpat for approximately 8 months but continues to take Keppra. He has a history of convulsive status epilepticus, which can be fatal if not managed appropriately. He has sustained nasal fractures on 3 occasions due to falls during seizures. His mother has not witnessed any of his seizures. He did not experience seizures until adulthood, with the first episode occurring at age 23. He attributes the onset of his seizures to the use of synthetic cannabis, which he has since discontinued.    SOCIAL HISTORY  Recreational Drugs: The patient mentioned using a synthetic drug called \"spice\" in the past, which he associated with the onset of his seizures.  Sleep: The patient mentioned having seizures during sleep and waking up in different locations without recollection.    MEDICATIONS  CURRENT MEDS:  Keppra  PREVIOUS MEDS:  Vimpat 200 mg Twice daily  End Date: Approximately 9/2024  Reason for Discontinuation: Pharmacy could not get it to him  Zantac  Reason for Discontinuation: [facility] stopped keeping it in their pharmacy      Objective   Vital Signs:   /93 (BP Location: Left arm, Patient Position: Sitting, Cuff Size: Large Adult)   Pulse 79   Ht 177.8 cm (70\")   Wt 108 kg (239 lb)   BMI 34.29 kg/m²     Physical Exam   He is alert, fluent, phasic, follows commands well.  Heart is regular rhythm normal rate.     Results  Imaging   - MRI of the brain: 2022, No epileptogenic focus identified         Assessment and Plan  Diagnoses and all orders for this visit:    1. Localization-related symptomatic epilepsy and epileptic syndromes with simple partial seizures, not intractable, without status epilepticus (Primary)  -     lacosamide (VIMPAT) 50 MG tablet tablet; 1 tablet twice a day first week  Dispense: 14 tablet; Refill: 0  -     lacosamide (VIMPAT) 100 MG tablet tablet; 1 tablet twice a day second week  Dispense: 14 tablet; Refill: 0  -     lacosamide 150 MG tablet; 1 tablet " twice a day third week  Dispense: 14 tablet; Refill: 0  -     lacosamide (VIMPAT) 200 MG tablet; 1 tablet twice a day fourth week and on.  Dispense: 60 tablet; Refill: 5  -     Ambulatory Referral to Neurology    2. Generalized convulsive seizures  -     lacosamide (VIMPAT) 50 MG tablet tablet; 1 tablet twice a day first week  Dispense: 14 tablet; Refill: 0  -     lacosamide (VIMPAT) 100 MG tablet tablet; 1 tablet twice a day second week  Dispense: 14 tablet; Refill: 0  -     lacosamide 150 MG tablet; 1 tablet twice a day third week  Dispense: 14 tablet; Refill: 0  -     lacosamide (VIMPAT) 200 MG tablet; 1 tablet twice a day fourth week and on.  Dispense: 60 tablet; Refill: 5  -     Ambulatory Referral to Neurology         Assessment & Plan  1. Seizures.  He has been experiencing intermittent and random seizures, with the most recent episode occurring 2 days ago. He reports symptoms such as profuse sweating, rapid heartbeat, and blackouts preceding the seizures. He has a history of convulsive status epilepticus and has been transferred by helicopter for treatment multiple times. He has been off Vimpat for about 8 months due to pharmacy issues but continues to take Keppra. MRI of the brain conducted in 2022 did not reveal any epileptogenic focus. A comprehensive discussion was held regarding the nature of his seizures, including the distinction between epileptic and nonepileptic seizures. The potential need for epilepsy monitoring to identify the origin of the seizures was also discussed. He will resume Vimpat at a dosage of 50 mg twice daily for the first week, increasing to 100 mg twice daily in the second week, 150 mg twice daily in the third week, and finally 200 mg twice daily thereafter. He will continue his current regimen of Keppra. The prescription for Vimpat will be sent to [facility] in [place]. He is advised to contact us within the next 2 weeks if he does not get a call from [facility]  epilepsy clinic for an appointment.      Total time spent with the patient and coordinating patient care was 40 minutes.    Follow Up  No follow-ups on file.  Patient was given instructions and counseling regarding his condition or for health maintenance advice. Please see specific information pulled into the AVS if appropriate.     Patient or patient representative verbalized consent for the use of Ambient Listening during the visit with  Vinay Marquis MD for chart documentation. 5/21/2025  17:37 EDT